# Patient Record
Sex: FEMALE | Race: OTHER | HISPANIC OR LATINO | Employment: FULL TIME | URBAN - METROPOLITAN AREA
[De-identification: names, ages, dates, MRNs, and addresses within clinical notes are randomized per-mention and may not be internally consistent; named-entity substitution may affect disease eponyms.]

---

## 2017-11-13 ENCOUNTER — OFFICE VISIT (OUTPATIENT)
Dept: FAMILY MEDICINE | Facility: CLINIC | Age: 22
End: 2017-11-13
Payer: COMMERCIAL

## 2017-11-13 ENCOUNTER — HOSPITAL ENCOUNTER (EMERGENCY)
Facility: HOSPITAL | Age: 22
Discharge: HOME | End: 2017-11-13
Attending: EMERGENCY MEDICINE
Payer: COMMERCIAL

## 2017-11-13 VITALS
WEIGHT: 164 LBS | DIASTOLIC BLOOD PRESSURE: 71 MMHG | BODY MASS INDEX: 29.06 KG/M2 | TEMPERATURE: 98 F | HEIGHT: 63 IN | OXYGEN SATURATION: 99 % | SYSTOLIC BLOOD PRESSURE: 117 MMHG | HEART RATE: 80 BPM | RESPIRATION RATE: 15 BRPM

## 2017-11-13 VITALS
WEIGHT: 166.13 LBS | HEIGHT: 62 IN | BODY MASS INDEX: 30.57 KG/M2 | HEART RATE: 76 BPM | SYSTOLIC BLOOD PRESSURE: 100 MMHG | TEMPERATURE: 99 F | OXYGEN SATURATION: 98 % | DIASTOLIC BLOOD PRESSURE: 70 MMHG

## 2017-11-13 DIAGNOSIS — R42 DIZZINESS: ICD-10-CM

## 2017-11-13 DIAGNOSIS — H53.131 ACUTE LOSS OF VISION, RIGHT: Primary | ICD-10-CM

## 2017-11-13 DIAGNOSIS — R20.0 NUMBNESS ON RIGHT SIDE: ICD-10-CM

## 2017-11-13 DIAGNOSIS — H53.9 VISION CHANGES: ICD-10-CM

## 2017-11-13 DIAGNOSIS — R20.2 TINGLING: Primary | ICD-10-CM

## 2017-11-13 LAB
B-HCG UR QL: NEGATIVE
CTP QC/QA: YES

## 2017-11-13 PROCEDURE — 81025 URINE PREGNANCY TEST: CPT | Performed by: EMERGENCY MEDICINE

## 2017-11-13 PROCEDURE — 93005 ELECTROCARDIOGRAM TRACING: CPT

## 2017-11-13 PROCEDURE — 25000003 PHARM REV CODE 250: Performed by: NURSE PRACTITIONER

## 2017-11-13 PROCEDURE — 99999 PR PBB SHADOW E&M-NEW PATIENT-LVL III: CPT | Mod: PBBFAC,,, | Performed by: FAMILY MEDICINE

## 2017-11-13 PROCEDURE — 99203 OFFICE O/P NEW LOW 30 MIN: CPT | Mod: S$GLB,,, | Performed by: FAMILY MEDICINE

## 2017-11-13 PROCEDURE — 99284 EMERGENCY DEPT VISIT MOD MDM: CPT | Mod: 25

## 2017-11-13 PROCEDURE — 93010 ELECTROCARDIOGRAM REPORT: CPT | Mod: ,,, | Performed by: INTERNAL MEDICINE

## 2017-11-13 RX ORDER — TETRACAINE HYDROCHLORIDE 5 MG/ML
2 SOLUTION OPHTHALMIC
Status: COMPLETED | OUTPATIENT
Start: 2017-11-13 | End: 2017-11-13

## 2017-11-13 RX ADMIN — TETRACAINE HYDROCHLORIDE 2 DROP: 5 SOLUTION OPHTHALMIC at 10:11

## 2017-11-13 RX ADMIN — FLUORESCEIN SODIUM 1 STRIP: 1 STRIP OPHTHALMIC at 10:11

## 2017-11-13 NOTE — PROGRESS NOTES
Office Visit    Patient Name: Talia Marin    : 1995  MRN: [id number]      Assessment/Plan:  Talia Marin is a 22 y.o. female who presents today for :    Acute loss of vision, right  Numbness on right side  -     Refer to Emergency Dept.    -AFVSS, patient appears well during this visit  -no focal neurological Sx but given Fhx and recurrence of episode of visual field loss prior to clinic encounter, will send patient to ED for further evaluation.      -I had a long discussion with patient about need to go to ED right now for further evaluation given her presentation of acute visual field loss with recurring unprovoked Sx.  I offered to call ambulance for patient but she refused and states that her mother will come and drive her, which I did not recommend.      -I notified ED that patient is come.    Return for worsening Sx. Urgent care/ED precautions provided.     This note was created by combination of typed  and Dragon dictation.  Transcription errors may be present.  If there are any questions, please contact me.        ----------------------------------------------------------------------------------------------------------------------      HPI:  Talia is a 22 y.o. female who presents today for:    numbess and vision loss        This patient has multiple medical diagnoses as noted below.    This patient is new to me     Patient is here today for numbess and vision loss  that she had felt yesterday for about 10 minutes. States that she was working and suddently felt that she couldn't see anything on the right side of her visual field. She also endorsed right sided numbness and remembering having a hard time moving her R arm - this spontaneously went away after a few seconds.  She had some moderate frontal headache associated for a few hours after the vision loss, but that had resolved since yesterday.    Earlier this afternoon, while driving to this appointment visit, she states that she  "started to feel that the Right side of her visual field started to slip again, and that she is losing her sight again with blurry vision and feeling like she would pass out, with frontal headache.  She is not sure if she continues to have the same numbness she had yesterday. She denies weakness today.  She is not on any medications at this time. She has a Fhx HTN/HLD on her mother's side, as well as an aunt with a h/o Stroke.    Denies any F/C/N/V/palpitations/CP/LEONE/SOB/photophobia or phonophobia/syncope/neck stiffness/tingling/abd pain/swelling/bowel or bladder changes.   NO slurring of speech/confusion/falling or gait changes         Patient Active Problem List   Diagnosis    Tingling       History reviewed. No pertinent surgical history.    Family History   Problem Relation Age of Onset    Hyperlipidemia Father     Hypertension Father        Social History     Social History    Marital status: Single     Spouse name: N/A    Number of children: N/A    Years of education: N/A     Occupational History    Not on file.     Social History Main Topics    Smoking status: Never Smoker    Smokeless tobacco: Never Used    Alcohol use No    Drug use: No    Sexual activity: Not on file     Other Topics Concern    Not on file     Social History Narrative    No narrative on file       Current Medications  Medications reviewed and updated.     Allergies   Review of patient's allergies indicates:  No Known Allergies          Review of Systems  See HPI      Physical Exam  /70 (BP Location: Left arm, Patient Position: Sitting, BP Method: Large (Manual))   Pulse 76   Temp 98.5 °F (36.9 °C) (Oral)   Ht 5' 2" (1.575 m)   Wt 75.4 kg (166 lb 1.9 oz)   SpO2 98%   BMI 30.38 kg/m²     GEN: NAD, well developed, pleasant, well nourished  HEENT: NCAT, PERRLA, EOMI, sclera clear, anicteric, O/P clear, MMM with no lesions  NECK: normal, supple with midline trachea, no LAD, no thyromegaly  LUNGS: CTAB, no w/r/r, no " increased work of breathing   HEART: RRR, normal S1 and S2, no m/r/g, no edema  ABD: s/nt/nd, NABS  SKIN: normal turgor, no rashes  PSYCH: AOx3, appropriate mood and affect  MSK: warm/well perfused, normal ROM in all 4 extremities, no c/c/e.  NEURO: normal without focal findings, CN II-XII are intact.  Sensation grossly normal, gait and station normal. Normal hearing test  Reflexes 2+ for biceps, brachial, patellar and achilles bilateral and symmetric, finger to nose and cerebellar exam normal, no tremors

## 2017-11-14 NOTE — ED PROVIDER NOTES
"Encounter Date: 11/13/2017    SCRIBE #1 NOTE: I, Tri Florence, am scribing for, and in the presence of,  Megan Mata NP . I have scribed the following portions of the note - Other sections scribed: HPI and ROS .       History     Chief Complaint   Patient presents with    Tingling     " Saturday night I noticed my vision in my right eye was getting blurry. Yesterday at work I felt like the right side of my body was tingling. I was evaluated by my PCP (MD Ailyn) who asked my to come to the ER for further evaluation. Denies symptoms curreently      CC:Numbness and blurred vision.   History obtained from patient.  Pt arrived via personal transportation.     HPI: This 22 y.o. Female, who has Anemia, presents to the ED for evaluation of multiple medical complaints, as follows: 2 days ago, the patient experienced mildly blurred vision in the R eye periphery, lasting seconds. 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 She states symptoms resolved spontaneously after lasting briefly. However, she was encouraged to seek treatment in a nearby urgent care by her boss. There were no significant findings. She was evaluated by her PCP, Dr. Robertson today who encouraged her to seek further evaluation in the ED. After being evaluated today while driving to the ED, she again experienced blurred vision in the R eye. She also complains of a left sided headache and "sharp" Right lateral neck pain today and states, "It felt like my eyeball (left) was going to pop out of my head especially when I bent down to  my daughter." She also notes an episode of anxiety with associated SOB followed by onset of mild chest pain while driving to be evaluated in the ED. She notes that this was induced by a stressful conversation " with her sister over the phone while driving. The patient further complains of nausea and difficulty sleeping x 1 month. Overall, the patient denies vomiting or fever. She reports no further symptoms. No prior attempted treatment. No alleviating factors. The patient has no history of tobacco abuse.       The history is provided by the patient.     Review of patient's allergies indicates:  No Known Allergies  Past Medical History:   Diagnosis Date    Anemia      History reviewed. No pertinent surgical history.  Family History   Problem Relation Age of Onset    Hyperlipidemia Father     Hypertension Father      Social History   Substance Use Topics    Smoking status: Never Smoker    Smokeless tobacco: Never Used    Alcohol use No     Review of Systems   Constitutional: Negative for chills and fever.   HENT: Negative for congestion and rhinorrhea.    Eyes: Positive for visual disturbance (blurred in R eye, currently resolved ). Negative for redness.   Respiratory: Positive for shortness of breath (currently resolved ). Negative for cough.    Cardiovascular: Positive for chest pain.   Gastrointestinal: Negative for abdominal pain, diarrhea, nausea and vomiting.   Genitourinary: Negative for difficulty urinating and dysuria.   Musculoskeletal: Positive for neck pain.   Skin: Negative for rash.   Neurological: Positive for numbness (R side body ) and headaches. Negative for tremors, seizures and syncope.   Psychiatric/Behavioral: Positive for sleep disturbance. The patient is nervous/anxious.        Physical Exam     Initial Vitals [11/13/17 1826]   BP Pulse Resp Temp SpO2   (!) 148/80 101 16 99 °F (37.2 °C) 100 %      MAP       102.67         Physical Exam    Constitutional: Vital signs are normal. She appears well-developed and well-nourished.  Non-toxic appearance.   HENT:   Head: Normocephalic and atraumatic.   Right Ear: Tympanic membrane normal.   Left Ear: Tympanic membrane normal.   Nose: Nose normal.    Mouth/Throat: Uvula is midline and oropharynx is clear and moist. No trismus in the jaw.   Eyes: Conjunctivae, EOM and lids are normal. Pupils are equal, round, and reactive to light.   Slit lamp exam:       The right eye shows no corneal abrasion, no corneal flare, no corneal ulcer, no fluorescein uptake and no anterior chamber bulge.        The left eye shows no corneal abrasion, no corneal flare, no corneal ulcer, no fluorescein uptake and no anterior chamber bulge.   No pain with EOMs or photophobia  Left eye IOP: 18mmHg  Right eye IOP: 20mmHg   Neck: Full passive range of motion without pain. Neck supple. No neck rigidity.   Cardiovascular: Normal rate, S1 normal, S2 normal and normal heart sounds. Exam reveals no gallop.    No murmur heard.  Pulmonary/Chest: Effort normal and breath sounds normal. No tachypnea. She has no decreased breath sounds. She has no wheezes. She has no rhonchi. She has no rales.   Neurological: She is alert and oriented to person, place, and time. She has normal strength. Gait normal. GCS eye subscore is 4. GCS verbal subscore is 5. GCS motor subscore is 6.   Skin: Skin is warm and dry. No rash noted.         ED Course   Procedures  Labs Reviewed   POCT URINE PREGNANCY     EKG Readings: (Independently Interpreted)   Rhythm: Normal Sinus Rhythm. Heart Rate: 74. Ectopy: No Ectopy. Conduction: Normal. ST Segments: Normal ST Segments. T Waves: Normal. Clinical Impression: Normal Sinus Rhythm       X-Rays:   Independently Interpreted Readings:   Head CT: No hemorrhage.  No skull fracture.  No acute stroke.     Medical Decision Making:   ED Management:  This is a 22-year-old nonpregnant female who presents to the ED with complaints of intermittent, right sided numbness, blurry vision with associated left sided headaches.  She is afebrile and well-appearing.  At the time of evaluation, she is asymptomatic.  On exam, she is neurologically intact.  Her vision is 20/25.  Slit-lamp examination  unremarkable.  Her IOP's are at the higher end of normal.  No blurry or decreased peripheral vision at this time.  Head CT is negative.  I suspect ocular migraine.  No evidence to suggest meningitis, stroke, ruptured aneurysm or other serious etiology.  Strict return precautions given.  Discharged home with instructions for supportive care and follow-up with neurology.  Patient was also seen and evaluated by Dr. Jo, who agrees her plan.            Scribe Attestation:   Scribe #1: I performed the above scribed service and the documentation accurately describes the services I performed. I attest to the accuracy of the note.    Attending Attestation:           Physician Attestation for Scribe:  Physician Attestation Statement for Scribe #1: I, Megan Mata NP, reviewed documentation, as scribed by Tri Florence  in my presence, and it is both accurate and complete.                 ED Course      Clinical Impression:   The primary encounter diagnosis was Tingling. Diagnoses of Dizziness and Vision changes were also pertinent to this visit.    Disposition:   Disposition: Discharged  Condition: Stable                        Megan Mata NP  11/14/17 0221

## 2017-11-14 NOTE — DISCHARGE INSTRUCTIONS
You may be having ocular migraines.  You need evaluation by a specialist--eye and neurology.  Please schedule follow up appointments as soon as possible.

## 2017-11-14 NOTE — ED TRIAGE NOTES
""yesterday the right side of my body went numb, I couldn't see to the side out of right eye. I have a constant headache and right sided neck pain. My PCP wants me to get an MRI." Alaso having blurred vision and nausea w/o vom.   "

## 2017-11-15 ENCOUNTER — TELEPHONE (OUTPATIENT)
Dept: OPHTHALMOLOGY | Facility: CLINIC | Age: 22
End: 2017-11-15

## 2018-08-01 ENCOUNTER — OFFICE VISIT (OUTPATIENT)
Dept: OBSTETRICS AND GYNECOLOGY | Facility: CLINIC | Age: 23
End: 2018-08-01
Payer: COMMERCIAL

## 2018-08-01 VITALS
BODY MASS INDEX: 31.76 KG/M2 | WEIGHT: 179.25 LBS | DIASTOLIC BLOOD PRESSURE: 58 MMHG | HEIGHT: 63 IN | SYSTOLIC BLOOD PRESSURE: 100 MMHG

## 2018-08-01 DIAGNOSIS — Z32.01 POSITIVE PREGNANCY TEST: ICD-10-CM

## 2018-08-01 DIAGNOSIS — N91.1 AMENORRHEA, SECONDARY: Primary | ICD-10-CM

## 2018-08-01 LAB
B-HCG UR QL: POSITIVE
CTP QC/QA: YES

## 2018-08-01 PROCEDURE — 99999 PR PBB SHADOW E&M-EST. PATIENT-LVL III: CPT | Mod: PBBFAC,,, | Performed by: OBSTETRICS & GYNECOLOGY

## 2018-08-01 PROCEDURE — 81025 URINE PREGNANCY TEST: CPT | Mod: S$GLB,,, | Performed by: OBSTETRICS & GYNECOLOGY

## 2018-08-01 PROCEDURE — 99203 OFFICE O/P NEW LOW 30 MIN: CPT | Mod: S$GLB,,, | Performed by: OBSTETRICS & GYNECOLOGY

## 2018-08-01 PROCEDURE — 3008F BODY MASS INDEX DOCD: CPT | Mod: CPTII,S$GLB,, | Performed by: OBSTETRICS & GYNECOLOGY

## 2018-08-01 NOTE — PROGRESS NOTES
"[facility]  Ambulatory Clinic  Obstetrics & Gynecology    Visit Date:  2018     Chief Complaint:  Missed my period    History of Present Illness:      Talia Marin is a 23 y.o. , UPT positive today, here with c/o missed period.    Patient's last menstrual period was 2018.    Pt has no major complaints today and denies any vaginal bleeding, discharge, pain, GI/ compliants.      Pt reports overall good health.    Past Medical History:      None      Past Surgical History:     None     Medications:      Prenatal vitamins     Allergies:      NKDA      Obstetric History:          T2      L2     SAB1   TAB0   Ectopic0   Multiple0   Live Births2       # Outcome Date GA Lbr Brandan/2nd Weight Sex Delivery Anes PTL Lv   4 Current            3 Term 06/08/15    F Vag-Spont  N ALEE   2 Term 12    M Vag-Spont  N ALEE   1 SAB               Obstetric Comments   G2- IUFD of twins at 17 wks     Gynecologic History:      Denies recent/active STI  Denies history abnormal Pap, last pap 2017 at [facility]     Social History:      Denies tobacco, alcohol or illicit drug use  Current partner is father of baby  Denies domestic abuse     Family History:       Denies congenital anomalies, inherited syndromes, fetal aneuploidy    Review of Systems:      Constitutional:  No fever, fatigue  HENT:  No congestion, hearing changes  Eyes:  No visual disturbance  Respiratory:  No cough, shortness of breath  Cardiovascular:  No chest pain, leg swelling  Breast:  No lump, pain, nipple discharge, redness, skin changes  Gastrointestinal:  No abdominal pain, constipation, blood in stool   Genitourinary:  No dysuria, frequency  Endocrine:  No heat or cold intolerance  Musculoskeletal:  No back pain, arthralgias  Skin:  No rash, jaundice  Neurological:  No dizziness, weakness, headaches  Psychiatric/Behavioral:  No sleep disturbance, dysphoric mood     Physical Exam:     BP (!) 100/58   Ht 5' 3" " (1.6 m)   Wt 81.3 kg (179 lb 3.7 oz)   LMP 2018   BMI 31.75 kg/m²      GENERAL:  NAD. Well-nourished. A&Ox3.  HEENT:  NCAT, EOMI, moist mucus membranes.  Neck supple w/o masses.  BREAST:  Symmetric, no obvious masses, adenopathy, skin changes or nipple discharge.  LUNGS:  CTA-B.  HEART:  RRR, physiologic heart sounds.  ABDOMEN:  Soft, non-tender. Normoactive BS.  No obvious organomegaly.    EXT:  Symmetric w/o cramping, claudication, or edema. +2 distal pulses. FROM.  SKIN:  No rashes  NEURO:  CN II - XII grossly intact bilaterally. +2 DTR.  PSYCH:  Mood & affect appropriate.       PELVIC:  Female external genitalia w/o any obvious lesions.  Adequate perineal body. Normal urethral meatus. No gross lymphadenopathy.    Vagina:  Pink, moist, well-rugated.  Vaginal vault with good support.  No obvious lesion.  No discharge noted.     Cervix:  No cervical motion tenderness, discharge, or obvious lesions.  Closed, thick, posterior.  Uterus:  Small, non-tender, normal contour.  Adnexa:  No masses or tenderness.    Rectal:  Declined.  No obvious external lesions.   Wet prep:  Negative    Chaperone present for exam.    Assessment:     23 y.o. , UPT positive, LMP 2018, here for confirmation of pregnancy    Plan:    We discussed principles of prenatal care, weight gain goals, dietary/lifestyle modifications, pregnancy care instructions and precautions.  Prenatal educational material given to pt.  Continue prenatal vitamins.  SAB and ectopic precautions reviewed.      Return in 4 weeks for OB visit, or return sooner prn.  All questions answered, pt voiced understanding.      George Robertson MD

## 2018-08-17 ENCOUNTER — TELEPHONE (OUTPATIENT)
Dept: OBSTETRICS AND GYNECOLOGY | Facility: CLINIC | Age: 23
End: 2018-08-17

## 2018-08-17 DIAGNOSIS — O21.9 NAUSEA AND VOMITING DURING PREGNANCY: Primary | ICD-10-CM

## 2018-08-17 RX ORDER — ONDANSETRON 4 MG/1
4 TABLET, ORALLY DISINTEGRATING ORAL EVERY 6 HOURS PRN
Qty: 30 TABLET | Refills: 0 | Status: SHIPPED | OUTPATIENT
Start: 2018-08-17 | End: 2019-01-28 | Stop reason: SDUPTHER

## 2018-08-17 NOTE — TELEPHONE ENCOUNTER
----- Message from Khanh Harmon sent at 8/17/2018  3:51 PM CDT -----  Contact: self  Pt called requesting nausea medication. [place] [phone]. Pt [phone].

## 2018-09-05 ENCOUNTER — INITIAL PRENATAL (OUTPATIENT)
Dept: OBSTETRICS AND GYNECOLOGY | Facility: CLINIC | Age: 23
End: 2018-09-05
Payer: COMMERCIAL

## 2018-09-05 VITALS
SYSTOLIC BLOOD PRESSURE: 116 MMHG | HEART RATE: 70 BPM | WEIGHT: 178 LBS | BODY MASS INDEX: 31.53 KG/M2 | DIASTOLIC BLOOD PRESSURE: 68 MMHG

## 2018-09-05 DIAGNOSIS — Z36.89 ENCOUNTER TO ESTABLISH GESTATIONAL AGE USING ULTRASOUND: ICD-10-CM

## 2018-09-05 DIAGNOSIS — Z3A.10 10 WEEKS GESTATION OF PREGNANCY: Primary | ICD-10-CM

## 2018-09-05 PROCEDURE — 87088 URINE BACTERIA CULTURE: CPT

## 2018-09-05 PROCEDURE — 0500F INITIAL PRENATAL CARE VISIT: CPT | Mod: S$GLB,,, | Performed by: OBSTETRICS & GYNECOLOGY

## 2018-09-05 PROCEDURE — 87086 URINE CULTURE/COLONY COUNT: CPT

## 2018-09-05 PROCEDURE — 76801 OB US < 14 WKS SINGLE FETUS: CPT | Mod: S$GLB,,, | Performed by: OBSTETRICS & GYNECOLOGY

## 2018-09-05 PROCEDURE — 99999 PR PBB SHADOW E&M-EST. PATIENT-LVL III: CPT | Mod: PBBFAC,,, | Performed by: OBSTETRICS & GYNECOLOGY

## 2018-09-05 PROCEDURE — 87077 CULTURE AEROBIC IDENTIFY: CPT

## 2018-09-05 PROCEDURE — 87186 SC STD MICRODIL/AGAR DIL: CPT

## 2018-09-05 NOTE — PROGRESS NOTES
"[facility]  Ambulatory Clinic  Obstetrics & Gynecology    Visit Date:  2018     Chief Complaint:  Missed my period    History of Present Illness:      Talia Marin is a 23 y.o. , UPT positive today, here with c/o missed period.    Patient's last menstrual period was 2018.    Pt has no major complaints today and denies any vaginal bleeding, discharge, pain, GI/ compliants.      Pt reports overall good health.    Past Medical History:      None      Past Surgical History:     None     Medications:      Prenatal vitamins     Allergies:      NKDA      Obstetric History:          T2      L2     SAB1   TAB0   Ectopic0   Multiple0   Live Births2       # Outcome Date GA Lbr Brandan/2nd Weight Sex Delivery Anes PTL Lv   4 Current            3 Term 06/08/15    F Vag-Spont  N ALEE   2 Term 12    M Vag-Spont  N ALEE   1 SAB               Obstetric Comments   G2- IUFD of twins at 17 wks     Gynecologic History:      Denies recent/active STI  Denies history abnormal Pap, last pap 2017 at [facility]     Social History:      Denies tobacco, alcohol or illicit drug use  Current partner is father of baby  Denies domestic abuse     Family History:       Denies congenital anomalies, inherited syndromes, fetal aneuploidy    Review of Systems:      Constitutional:  No fever, fatigue  HENT:  No congestion, hearing changes  Eyes:  No visual disturbance  Respiratory:  No cough, shortness of breath  Cardiovascular:  No chest pain, leg swelling  Breast:  No lump, pain, nipple discharge, redness, skin changes  Gastrointestinal:  No abdominal pain, constipation, blood in stool   Genitourinary:  No dysuria, frequency  Endocrine:  No heat or cold intolerance  Musculoskeletal:  No back pain, arthralgias  Skin:  No rash, jaundice  Neurological:  No dizziness, weakness, headaches  Psychiatric/Behavioral:  No sleep disturbance, dysphoric mood     Physical Exam:     BP (!) 100/58   Ht 5' 3" " (1.6 m)   Wt 81.3 kg (179 lb 3.7 oz)   LMP 2018   BMI 31.75 kg/m²      GENERAL:  NAD. Well-nourished. A&Ox3.  HEENT:  NCAT, EOMI, moist mucus membranes.  Neck supple w/o masses.  BREAST:  Symmetric, no obvious masses, adenopathy, skin changes or nipple discharge.  LUNGS:  CTA-B.  HEART:  RRR, physiologic heart sounds.  ABDOMEN:  Soft, non-tender. Normoactive BS.  No obvious organomegaly.    EXT:  Symmetric w/o cramping, claudication, or edema. +2 distal pulses. FROM.  SKIN:  No rashes  NEURO:  CN II - XII grossly intact bilaterally. +2 DTR.  PSYCH:  Mood & affect appropriate.       PELVIC:  Female external genitalia w/o any obvious lesions.  Adequate perineal body. Normal urethral meatus. No gross lymphadenopathy.    Vagina:  Pink, moist, well-rugated.  Vaginal vault with good support.  No obvious lesion.  No discharge noted.     Cervix:  No cervical motion tenderness, discharge, or obvious lesions.  Closed, thick, posterior.  Uterus:  Small, non-tender, normal contour.  Adnexa:  No masses or tenderness.    Rectal:  Declined.  No obvious external lesions.   Wet prep:  Negative    Chaperone present for exam.    Assessment:     23 y.o. , UPT positive, LMP 2018, here for confirmation of pregnancy    Plan:    We discussed principles of prenatal care, weight gain goals, dietary/lifestyle modifications, pregnancy care instructions and precautions.  Prenatal educational material given to pt.  Continue prenatal vitamins.  SAB and ectopic precautions reviewed.      Return in 4 weeks for OB visit, or return sooner prn.  All questions answered, pt voiced understanding.      George Robertson MD    _____________________________________________________________________    2018    10w2d here for initial OB visit and dating ultrasound.  Pt has no major complaints today.  Dating u/s shows single live IUP at 11w0d with SARI 3/27/2018.  Limitation of today's u/s exam discussed.  Order initial OB labs.  Pt declined  first trimester aneuploidy screening, and state she would not terminate the pregnancy for any reasons.  Principles of prenatal care and precautions reviewed.  Return 4 wks.  Voiced understanding.  Significant other present for visit.    George Robertson MD  _____________________________________________________________________

## 2018-09-07 LAB — BACTERIA UR CULT: NORMAL

## 2018-09-08 DIAGNOSIS — R82.71 ASYMPTOMATIC BACTERIURIA: Primary | ICD-10-CM

## 2018-09-08 RX ORDER — NITROFURANTOIN 25; 75 MG/1; MG/1
100 CAPSULE ORAL 2 TIMES DAILY
Qty: 14 CAPSULE | Refills: 0 | Status: SHIPPED | OUTPATIENT
Start: 2018-09-08 | End: 2018-09-15

## 2018-09-11 ENCOUNTER — LAB VISIT (OUTPATIENT)
Dept: LAB | Facility: HOSPITAL | Age: 23
End: 2018-09-11
Attending: OBSTETRICS & GYNECOLOGY
Payer: COMMERCIAL

## 2018-09-11 DIAGNOSIS — Z36.89 ENCOUNTER TO ESTABLISH GESTATIONAL AGE USING ULTRASOUND: ICD-10-CM

## 2018-09-11 LAB
ABO + RH BLD: NORMAL
ALBUMIN SERPL BCP-MCNC: 3.4 G/DL
ALP SERPL-CCNC: 47 U/L
ALT SERPL W/O P-5'-P-CCNC: 11 U/L
ANION GAP SERPL CALC-SCNC: 7 MMOL/L
AST SERPL-CCNC: 16 U/L
BASOPHILS # BLD AUTO: 0.01 K/UL
BASOPHILS NFR BLD: 0.1 %
BILIRUB SERPL-MCNC: 0.3 MG/DL
BLD GP AB SCN CELLS X3 SERPL QL: NORMAL
BUN SERPL-MCNC: 6 MG/DL
CALCIUM SERPL-MCNC: 9.7 MG/DL
CHLORIDE SERPL-SCNC: 104 MMOL/L
CO2 SERPL-SCNC: 25 MMOL/L
CREAT SERPL-MCNC: 0.7 MG/DL
DIFFERENTIAL METHOD: ABNORMAL
EOSINOPHIL # BLD AUTO: 0.1 K/UL
EOSINOPHIL NFR BLD: 0.6 %
ERYTHROCYTE [DISTWIDTH] IN BLOOD BY AUTOMATED COUNT: 12.1 %
EST. GFR  (AFRICAN AMERICAN): >60 ML/MIN/1.73 M^2
EST. GFR  (NON AFRICAN AMERICAN): >60 ML/MIN/1.73 M^2
GLUCOSE SERPL-MCNC: 85 MG/DL
HCT VFR BLD AUTO: 33.7 %
HGB BLD-MCNC: 11.5 G/DL
LYMPHOCYTES # BLD AUTO: 2.5 K/UL
LYMPHOCYTES NFR BLD: 31.2 %
MCH RBC QN AUTO: 29.2 PG
MCHC RBC AUTO-ENTMCNC: 34.1 G/DL
MCV RBC AUTO: 86 FL
MONOCYTES # BLD AUTO: 0.6 K/UL
MONOCYTES NFR BLD: 8.1 %
NEUTROPHILS # BLD AUTO: 4.7 K/UL
NEUTROPHILS NFR BLD: 59.7 %
PLATELET # BLD AUTO: 341 K/UL
PMV BLD AUTO: 9.2 FL
POTASSIUM SERPL-SCNC: 3.7 MMOL/L
PROT SERPL-MCNC: 7.2 G/DL
RBC # BLD AUTO: 3.94 M/UL
SODIUM SERPL-SCNC: 136 MMOL/L
WBC # BLD AUTO: 7.88 K/UL

## 2018-09-11 PROCEDURE — 85025 COMPLETE CBC W/AUTO DIFF WBC: CPT

## 2018-09-11 PROCEDURE — 83036 HEMOGLOBIN GLYCOSYLATED A1C: CPT

## 2018-09-11 PROCEDURE — 86762 RUBELLA ANTIBODY: CPT

## 2018-09-11 PROCEDURE — 86901 BLOOD TYPING SEROLOGIC RH(D): CPT

## 2018-09-11 PROCEDURE — 87340 HEPATITIS B SURFACE AG IA: CPT

## 2018-09-11 PROCEDURE — 80053 COMPREHEN METABOLIC PANEL: CPT

## 2018-09-11 PROCEDURE — 86592 SYPHILIS TEST NON-TREP QUAL: CPT

## 2018-09-11 PROCEDURE — 36415 COLL VENOUS BLD VENIPUNCTURE: CPT

## 2018-09-11 PROCEDURE — 86703 HIV-1/HIV-2 1 RESULT ANTBDY: CPT

## 2018-09-12 LAB
ESTIMATED AVG GLUCOSE: 100 MG/DL
HBA1C MFR BLD HPLC: 5.1 %
HBV SURFACE AG SERPL QL IA: NEGATIVE
HIV 1+2 AB+HIV1 P24 AG SERPL QL IA: NEGATIVE

## 2018-09-13 ENCOUNTER — TELEPHONE (OUTPATIENT)
Dept: OBSTETRICS AND GYNECOLOGY | Facility: CLINIC | Age: 23
End: 2018-09-13

## 2018-09-13 LAB
RPR SER QL: NORMAL
RUBV IGG SER-ACNC: 6.9 IU/ML
RUBV IGG SER-IMP: ABNORMAL

## 2018-09-13 NOTE — TELEPHONE ENCOUNTER
----- Message from Doris Giles sent at 9/13/2018  9:19 AM CDT -----  Contact: Self/ [phone]  Pt returning call for results from 9/11/18 lab visit. Thank you.

## 2018-09-13 NOTE — TELEPHONE ENCOUNTER
Received incoming call from pt. Requested results from recent test. Information given. Pt acknowledged. CW

## 2018-09-28 ENCOUNTER — ROUTINE PRENATAL (OUTPATIENT)
Dept: OBSTETRICS AND GYNECOLOGY | Facility: CLINIC | Age: 23
End: 2018-09-28
Payer: COMMERCIAL

## 2018-09-28 VITALS — BODY MASS INDEX: 31.53 KG/M2 | SYSTOLIC BLOOD PRESSURE: 122 MMHG | WEIGHT: 178 LBS | DIASTOLIC BLOOD PRESSURE: 64 MMHG

## 2018-09-28 DIAGNOSIS — Z3A.13 13 WEEKS GESTATION OF PREGNANCY: Primary | ICD-10-CM

## 2018-09-28 PROCEDURE — 87088 URINE BACTERIA CULTURE: CPT

## 2018-09-28 PROCEDURE — 0502F SUBSEQUENT PRENATAL CARE: CPT | Mod: S$GLB,,, | Performed by: OBSTETRICS & GYNECOLOGY

## 2018-09-28 PROCEDURE — 87491 CHLMYD TRACH DNA AMP PROBE: CPT

## 2018-09-28 PROCEDURE — 99999 PR PBB SHADOW E&M-EST. PATIENT-LVL III: CPT | Mod: PBBFAC,,, | Performed by: OBSTETRICS & GYNECOLOGY

## 2018-09-28 PROCEDURE — 87077 CULTURE AEROBIC IDENTIFY: CPT

## 2018-09-28 PROCEDURE — 87186 SC STD MICRODIL/AGAR DIL: CPT

## 2018-09-28 PROCEDURE — 87086 URINE CULTURE/COLONY COUNT: CPT

## 2018-09-28 NOTE — PROGRESS NOTES
13w4d here for OB visit.    Pt c/o crampy lower back pain.  Pain is dull and crampy, episodic in nature, no particular aggravating factors, and relieved with rest.  Denies vaginal bleeding, leakage of fluid, or contractions.    Abd soft/NT.  No CVA or suprapubic tenderness.  We discussed supportive care measures for her lower back/round ligament pain discussed, tylenol prn, maternity belt.    Completed antibx for asymptomatic bacteriuria since last visit.  Denies UTI sxs.  Repreat urine cx today.    Order quad screen next visit.    Labor/back/UTI precautions.  Return 4 wks.  Voiced understanding.  Significant other present for visit.

## 2018-09-30 LAB
BACTERIA UR CULT: NORMAL
C TRACH DNA SPEC QL NAA+PROBE: DETECTED
N GONORRHOEA DNA SPEC QL NAA+PROBE: NOT DETECTED

## 2018-10-02 DIAGNOSIS — R82.71 ASYMPTOMATIC BACTERIURIA DURING PREGNANCY: ICD-10-CM

## 2018-10-02 DIAGNOSIS — O99.891 ASYMPTOMATIC BACTERIURIA DURING PREGNANCY: ICD-10-CM

## 2018-10-02 DIAGNOSIS — A74.9 CHLAMYDIA: Primary | ICD-10-CM

## 2018-10-02 RX ORDER — CEPHALEXIN 500 MG/1
500 CAPSULE ORAL EVERY 12 HOURS
Qty: 14 CAPSULE | Refills: 0 | Status: SHIPPED | OUTPATIENT
Start: 2018-10-02 | End: 2018-10-09

## 2018-10-02 RX ORDER — AZITHROMYCIN 500 MG/1
1000 TABLET, FILM COATED ORAL ONCE
Qty: 4 TABLET | Refills: 0 | Status: SHIPPED | OUTPATIENT
Start: 2018-10-02 | End: 2018-10-02

## 2018-10-05 ENCOUNTER — TELEPHONE (OUTPATIENT)
Dept: OBSTETRICS AND GYNECOLOGY | Facility: CLINIC | Age: 23
End: 2018-10-05

## 2018-10-05 NOTE — TELEPHONE ENCOUNTER
----- Message from George Robertson MD sent at 10/2/2018 11:13 PM CDT -----  Please notify pt of her POSITIVE chlamydia test and abnormal urine culture.      A Rx for azithromycin and keflex has been sent to her pharmacy on file at:    [facility] [id number]  [place] - [address] AT [facility] [address]  [address]  [place] [address]  Phone: [phone] Fax: [phone]    Advise pt to abstain from all sexually activities and inform her partner(s) to seek STI testing/treatment.    Compliance with f/u strongly advised.    Thanks

## 2018-10-05 NOTE — TELEPHONE ENCOUNTER
Patient notified of positive chlamydia and positive urine culture. Informed of medication sent to her pharmacy. Patient also made aware she needs to abstain from all sexual activities and inform partner to seek testing/treatment.

## 2018-10-15 ENCOUNTER — TELEPHONE (OUTPATIENT)
Dept: OBSTETRICS AND GYNECOLOGY | Facility: CLINIC | Age: 23
End: 2018-10-15

## 2018-10-15 NOTE — TELEPHONE ENCOUNTER
----- Message from Kelsy havenrosette sent at 10/15/2018  9:33 AM CDT -----  Contact: self [phone]  Pt. Called needing clarification on ondansetron (ZOFRAN-ODT) 4 MG TbDL  --------------------------------------------  10/15/18 @ 5053 ([facility])  CALL ATTEMPT TO PT UNSUCCESSFUL , MESSAGE LEFT FOR PT TO RETURN CALL TO OFFICE CONCERNING HER MEDICATION ZOFRAN

## 2018-10-19 ENCOUNTER — ROUTINE PRENATAL (OUTPATIENT)
Dept: OBSTETRICS AND GYNECOLOGY | Facility: CLINIC | Age: 23
End: 2018-10-19
Payer: COMMERCIAL

## 2018-10-19 VITALS
DIASTOLIC BLOOD PRESSURE: 72 MMHG | BODY MASS INDEX: 31.77 KG/M2 | WEIGHT: 179.38 LBS | SYSTOLIC BLOOD PRESSURE: 122 MMHG

## 2018-10-19 DIAGNOSIS — Z3A.16 16 WEEKS GESTATION OF PREGNANCY: Primary | ICD-10-CM

## 2018-10-19 PROCEDURE — 99999 PR PBB SHADOW E&M-EST. PATIENT-LVL II: CPT | Mod: PBBFAC,,, | Performed by: OBSTETRICS & GYNECOLOGY

## 2018-10-19 PROCEDURE — 0502F SUBSEQUENT PRENATAL CARE: CPT | Mod: S$GLB,,, | Performed by: OBSTETRICS & GYNECOLOGY

## 2018-10-19 RX ORDER — AZITHROMYCIN 500 MG/1
TABLET, FILM COATED ORAL
Refills: 0 | COMMUNITY
Start: 2018-10-03 | End: 2018-11-16

## 2018-10-19 NOTE — PROGRESS NOTES
16w4d here for OB visit.    Pt has no major complaints today.  Reports active fetus.  Denies vaginal bleeding, leakage of fluid, or contractions.    Pt completing antibx for asymptomatic bacteriuria.  Compliance with therapy advised.  Denies UTI sxs.    Order quad screen today.  Anatomy ultrasound next visit.    Precautions reviewed.  Return 4 wks.  Voiced understanding.  Significant other present for visit.

## 2018-10-23 ENCOUNTER — LAB VISIT (OUTPATIENT)
Dept: LAB | Facility: HOSPITAL | Age: 23
End: 2018-10-23
Attending: OBSTETRICS & GYNECOLOGY
Payer: COMMERCIAL

## 2018-10-23 DIAGNOSIS — Z3A.16 16 WEEKS GESTATION OF PREGNANCY: ICD-10-CM

## 2018-10-23 PROCEDURE — 81511 FTL CGEN ABNOR FOUR ANAL: CPT

## 2018-10-23 PROCEDURE — 36415 COLL VENOUS BLD VENIPUNCTURE: CPT

## 2018-10-26 DIAGNOSIS — O28.0 ABNORMAL QUAD SCREEN: Primary | ICD-10-CM

## 2018-10-26 NOTE — PROGRESS NOTES
Please notify pt her quad screen is ABNORMAL.  A refer to MFM has been ordered.  Inform pt MFM office will contact her soon to schedule f/u evaluation.   Thanks

## 2018-10-29 ENCOUNTER — TELEPHONE (OUTPATIENT)
Dept: OBSTETRICS AND GYNECOLOGY | Facility: CLINIC | Age: 23
End: 2018-10-29

## 2018-10-30 ENCOUNTER — TELEPHONE (OUTPATIENT)
Dept: OBSTETRICS AND GYNECOLOGY | Facility: CLINIC | Age: 23
End: 2018-10-30

## 2018-10-30 NOTE — TELEPHONE ENCOUNTER
----- Message from Drois Gill sent at 10/30/2018 12:14 PM CDT -----  Contact: pt            Name of Who is Calling: pt      What is the request in detail: pt needs test results. Please call pt      Can the clinic reply by [facility]: no      [facility]: [phone]

## 2018-11-01 ENCOUNTER — TELEPHONE (OUTPATIENT)
Dept: MATERNAL FETAL MEDICINE | Facility: CLINIC | Age: 23
End: 2018-11-01

## 2018-11-01 ENCOUNTER — PROCEDURE VISIT (OUTPATIENT)
Dept: MATERNAL FETAL MEDICINE | Facility: CLINIC | Age: 23
End: 2018-11-01
Payer: COMMERCIAL

## 2018-11-01 ENCOUNTER — INITIAL CONSULT (OUTPATIENT)
Dept: MATERNAL FETAL MEDICINE | Facility: CLINIC | Age: 23
End: 2018-11-01
Attending: OBSTETRICS & GYNECOLOGY
Payer: COMMERCIAL

## 2018-11-01 VITALS
DIASTOLIC BLOOD PRESSURE: 82 MMHG | SYSTOLIC BLOOD PRESSURE: 120 MMHG | BODY MASS INDEX: 31.91 KG/M2 | WEIGHT: 180.13 LBS

## 2018-11-01 DIAGNOSIS — O28.9 ABNORMAL FINDING ON ANTENATAL SCREEN: Primary | ICD-10-CM

## 2018-11-01 DIAGNOSIS — O28.0 ABNORMAL QUAD SCREEN: Primary | ICD-10-CM

## 2018-11-01 DIAGNOSIS — O28.0 ABNORMAL QUAD SCREEN: ICD-10-CM

## 2018-11-01 PROCEDURE — 99213 OFFICE O/P EST LOW 20 MIN: CPT | Mod: 25,S$GLB,, | Performed by: OBSTETRICS & GYNECOLOGY

## 2018-11-01 PROCEDURE — 76811 OB US DETAILED SNGL FETUS: CPT | Mod: S$GLB,,, | Performed by: OBSTETRICS & GYNECOLOGY

## 2018-11-01 PROCEDURE — 99999 PR PBB SHADOW E&M-EST. PATIENT-LVL III: CPT | Mod: PBBFAC,,, | Performed by: OBSTETRICS & GYNECOLOGY

## 2018-11-01 PROCEDURE — 3008F BODY MASS INDEX DOCD: CPT | Mod: CPTII,S$GLB,, | Performed by: OBSTETRICS & GYNECOLOGY

## 2018-11-01 NOTE — PROGRESS NOTES
"Spoke with [employer] No selection area for "" on Quad Screen order in Epic, but states that she will replace "white" race with "Other/" and rerun Quad screen. Will call us later this afternoon and tomorrow with results. Will call pt with results.  "

## 2018-11-01 NOTE — TELEPHONE ENCOUNTER
Spoke with patient. Let her know that we had not received anything from [facility] yet but it will probably be tomorrow when we get the updated Quad screen calculation. Pt verbalizes understanding.

## 2018-11-01 NOTE — LETTER
November 1, 2018      George Robertson MD  [address] [facility] [address]  [address]  [facility] [address]           [facility] - Maternal Fetal Med  [address] [facility] [address]  Phone: [phone]          Patient: Talia Marin   MR Number: [id number]   YOB: 1995   Date of Visit: 11/1/2018       Dear Dr. George Robertson:    Thank you for referring Talia Marin to me for evaluation. Attached you will find relevant portions of my assessment and plan of care.    If you have questions, please do not hesitate to call me. I look forward to following Talia Marin along with you.    Sincerely,    Jitendra Metcalf III, MD    Enclosure  CC:  No Recipients    If you would like to receive this communication electronically, please contact [email or web address] or [phone] to request more information on [employer] access.    For providers and/or their staff who would like to refer a patient to [facility], please contact us through our one-stop-shop provider referral line, [facility], at [phone].    If you feel you have received this communication in error or would no longer like to receive these types of communications, please e-mail [email or web address]

## 2018-11-01 NOTE — PROGRESS NOTES
Consulted by Dr. Robertson for abnormal QUAD screen    23  SARI 19; 18w3d    Prenatal record, chart and OB History reviewed  Maternal serum screening reported increased risk of SLOS  Pt interviewed and examined  Ultrasound performed  No interval problems  No leaking, bleeding or discharge    OB HX  2012 - Christopher; 7lbs;  at term  2015 - Nidhi; 6lbs;  at term    Smith-Lemli-Opitz syndrome is a developmental disorder that affects many parts of the body. This condition is characterized by distinctive  facial features, small head size (microcephaly), intellectual disability or learning problems, and behavioral problems. Many affected children  have the characteristic features of autism, a developmental condition that affects communication and social interaction. Malformations of the  heart, lungs, kidneys, gastrointestinal tract, and genitalia are also common.    Infants with Capps-Lemli-Opitz syndrome have weak muscle tone (hypotonia), experience feeding difficulties, and tend to grow more slowly  than  other infants. Most affected individuals have fused second and third toes (syndactyly), and some have extra fingers or toes (polydactyly).  The signs and symptoms of Smith-Lemli-Opitz syndrome vary widely. Mildly affected individuals may have only minor physical abnormalities  with learning and behavioral problems. Severe cases can be life-threatening and involve profound intellectual  disability and major physical abnormalities.    Capps-Lemli-Opitz syndrome affects an estimated 1 in 20,000. It is an autosomal recessive disorder. We discussed that typically most parents  need to be carriers to have an affected child. Carriers are typically asymptomatic.    Mutations in the DHCR7 gene cause Capps-Lemli-Opitz syndrome.  The DHCR7 gene provides instructions for making an enzyme called 7-dehydrocholesterol reductase. This enzyme is responsible for the final  step in the production of cholesterol.  Cholesterol is a waxy, fat-like substance that is produced in the body and obtained from foods that come  from animals (particularly egg yolks, meat, poultry, fish, and dairy products). Cholesterol is necessary for normal embryonic development and  has important functions both before and after birth. It is a structural component  of cell membranes and the protective substance covering nerve cells (myelin). Additionally, cholesterol plays a role in the production of certain  hormones and digestive acids.  Mutations in the DHCR7 gene reduce or eliminate the activity of 7-dehydrocholesterol reductase, preventing cells from producing enough  cholesterol. A lack of this enzyme also allows potentially toxic byproducts of cholesterol production to build up in the blood, nervous system,  and other tissues. The combination of low cholesterol levels and an accumulation of other substances likely  disrupts the growth and development of many body systems. It is not known, however, how this disturbance in cholesterol production leads to  the specific features of Capps-Lemli-Opitz syndrome.    Pt was counseled about the abnormal Quad screen and the increased risk of SLOS Syndrome. We discussed the limitations of ultrasound in  the definitive diagnosis of SLOS Syndrome and we discussed amniocentesis as method to assess for the diagnosis.    Interestingly, pt notes that her race is listed as  and not . We called [facility] and they do not have a space for  but  will check to see if this variable could influence results of SLOS screening  .    Impression  =========  QUAD screen reported elevated risk for SLOS  Normal fetal exam  Normal fetal growth,  Normal amniotic fluid volume.  Normal placental location without evidence of previa.,  Normal cervical length.    Recommendation  ==============  Will re-check fetal growth and development in 4 weeks  Waiting to hear from [facility] about ethnic influence on screen  results.

## 2018-11-02 ENCOUNTER — PATIENT MESSAGE (OUTPATIENT)
Dept: MATERNAL FETAL MEDICINE | Facility: CLINIC | Age: 23
End: 2018-11-02

## 2018-11-02 ENCOUNTER — TELEPHONE (OUTPATIENT)
Dept: MATERNAL FETAL MEDICINE | Facility: CLINIC | Age: 23
End: 2018-11-02

## 2018-11-02 NOTE — TELEPHONE ENCOUNTER
Patient was seen yesterday by Dr. Metcalf for an abnormal quad screen.  She noted at that time that F necessity was incorrect and it was subsequently recalculated.  On the recalculation the Capps-Lemli-Opitz risk was 1 to 13 but the trisomy 18 risk increased from 1 in 110 to 1 in 100 and thus became screen positive.  I discussed this on the phone with  Her and explained the significance.  We discussed options for further evaluation including cell free DNA screening.  I also explained that the normal targeted ultrasound that she had decreases the risk of both of those disorders but does not eliminated.  We provided her with information so that she could determine her out-of-pocket expense for cell free DNA screening.  She will notify us if she wishes to have it drawn.  A follow-up appointment has already been scheduled for a few weeks from now to complete the anatomy that could not be visualized.  She was told to contact our office with any questions or problems.

## 2018-11-09 LAB
# FETUSES US: ABNORMAL
2ND TRIMESTER 4 SCREEN PNL SERPL: POSITIVE
2ND TRIMESTER 4 SCREEN SERPL-IMP: ABNORMAL
AFP MOM SERPL: 1.08
AFP SERPL-MCNC: 40.1 NG/ML
AGE AT DELIVERY: 21
B-HCG MOM SERPL: 0.5
B-HCG SERPL-ACNC: 10.9 IU/ML
FET TS 21 RISK FROM MAT AGE: ABNORMAL
GA (DAYS): 6 D
GA (WEEKS): 17 WK
GA METHOD: ABNORMAL
IDDM PATIENT QL: ABNORMAL
INHIBIN A MOM SERPL: 0.81
INHIBIN A SERPL-MCNC: 130.5 PG/ML
SMOKING STATUS FTND: NO
TS 18 RISK FETUS: ABNORMAL
TS 21 RISK FETUS: ABNORMAL
U ESTRIOL MOM SERPL: 0.34
U ESTRIOL SERPL-MCNC: 0.39 NG/ML

## 2018-11-14 NOTE — TELEPHONE ENCOUNTER
"----- Message from Ramila Morales sent at 10/29/2018 10:10 AM CDT -----  Contact: self  [phone]  Pt is requesting to speak to you as to why she is referred to another doctor, "is there something wrong ". Pls call to [phone]. Thanks.......Belén  -----------------------------------------------------------  10/29/18 @ 7182 (ERNESTO)  RETURNED CALL TO [facility]ALL[facility], INFORMED HER THAT THE REASON SHE IS SEEING ANOTHER DR , IS THAT HER MATERNAL QUAD SCREEN IS ABN,BUT SHE SHOULD NOT WORRY BECAUSE WE SEND ALL THE NEW MOM'S TO GET CHECKED IF THE LEVELS ARE A LITTLE HIGH, INSTRUCTED PT TO CALL US AFTER THE TEST AND LET US KNOW WHAT THE DR TOLD HER , PT STATED HER UNDERSTANDING  " -ultrasound gallbladder   -miralax as needed   -we did discuss the possibility of functional pain, or symptoms related to personality type, stress, anxiety etc

## 2018-11-16 ENCOUNTER — ROUTINE PRENATAL (OUTPATIENT)
Dept: OBSTETRICS AND GYNECOLOGY | Facility: CLINIC | Age: 23
End: 2018-11-16
Payer: COMMERCIAL

## 2018-11-16 VITALS
SYSTOLIC BLOOD PRESSURE: 110 MMHG | WEIGHT: 184.06 LBS | DIASTOLIC BLOOD PRESSURE: 70 MMHG | BODY MASS INDEX: 32.61 KG/M2

## 2018-11-16 DIAGNOSIS — O28.0 ABNORMAL QUAD SCREEN: ICD-10-CM

## 2018-11-16 DIAGNOSIS — Z3A.20 20 WEEKS GESTATION OF PREGNANCY: Primary | ICD-10-CM

## 2018-11-16 PROCEDURE — 99999 PR PBB SHADOW E&M-EST. PATIENT-LVL II: CPT | Mod: PBBFAC,,, | Performed by: OBSTETRICS & GYNECOLOGY

## 2018-11-16 PROCEDURE — 0502F SUBSEQUENT PRENATAL CARE: CPT | Mod: S$GLB,,, | Performed by: OBSTETRICS & GYNECOLOGY

## 2018-11-16 NOTE — PROGRESS NOTES
20w4d here for OB visit.    Pregnancy complicated by abnormal quad screen.    Pt has no major complaints today.  Reports active fetus.  Denies vaginal bleeding, leakage of fluid, or contractions.    Pt seen MFM for abnormal quad screen.  MFM u/s shows normal appearing fetus.  Pt is undecided on [id number] at this time.  Pt has f/u with MFM on 11/28.  Pt states she will not terminate pregnancy for any reasons.    Pt was treated for chlamydia and asxs bacteriuria in previous visits.  Pt unable to leave a sufficient urine sample today for cx.  Will place a standing order for pt to leave at lab when ready.  Timely testing advised.  Pt denies UTI sxs or vaginal discharge.    Precautions reviewed.  Return 4 wks or sooner prn.  Voiced understanding.  Significant other present for visit.

## 2018-11-28 ENCOUNTER — HOSPITAL ENCOUNTER (OUTPATIENT)
Facility: HOSPITAL | Age: 23
Discharge: HOME | End: 2018-11-28
Attending: OBSTETRICS & GYNECOLOGY | Admitting: OBSTETRICS & GYNECOLOGY
Payer: COMMERCIAL

## 2018-11-28 ENCOUNTER — HOSPITAL ENCOUNTER (OUTPATIENT)
Dept: PERINATAL CARE | Facility: HOSPITAL | Age: 23
Discharge: HOME | End: 2018-11-28
Attending: OBSTETRICS & GYNECOLOGY
Payer: COMMERCIAL

## 2018-11-28 ENCOUNTER — TELEPHONE (OUTPATIENT)
Dept: PEDIATRIC CARDIOLOGY | Facility: CLINIC | Age: 23
End: 2018-11-28

## 2018-11-28 VITALS
SYSTOLIC BLOOD PRESSURE: 107 MMHG | HEART RATE: 82 BPM | WEIGHT: 184.06 LBS | BODY MASS INDEX: 32.61 KG/M2 | HEIGHT: 63 IN | DIASTOLIC BLOOD PRESSURE: 59 MMHG

## 2018-11-28 DIAGNOSIS — N30.90 CYSTITIS: Primary | ICD-10-CM

## 2018-11-28 DIAGNOSIS — O28.8: Primary | ICD-10-CM

## 2018-11-28 DIAGNOSIS — O28.9 ABNORMAL FINDING ON ANTENATAL SCREEN: ICD-10-CM

## 2018-11-28 DIAGNOSIS — N30.90 CYSTITIS: ICD-10-CM

## 2018-11-28 DIAGNOSIS — Z3A.22 22 WEEKS GESTATION OF PREGNANCY: Primary | ICD-10-CM

## 2018-11-28 DIAGNOSIS — Z34.90 PREGNANCY: ICD-10-CM

## 2018-11-28 LAB
BACTERIA #/AREA URNS HPF: ABNORMAL /HPF
BILIRUB UR QL STRIP: NEGATIVE
CLARITY UR: ABNORMAL
COLOR UR: YELLOW
GLUCOSE UR QL STRIP: NEGATIVE
HGB UR QL STRIP: NEGATIVE
KETONES UR QL STRIP: NEGATIVE
LEUKOCYTE ESTERASE UR QL STRIP: NEGATIVE
MICROSCOPIC COMMENT: ABNORMAL
NITRITE UR QL STRIP: POSITIVE
PH UR STRIP: 8 [PH] (ref 5–8)
PROT UR QL STRIP: NEGATIVE
SP GR UR STRIP: 1.01 (ref 1–1.03)
URN SPEC COLLECT METH UR: ABNORMAL
UROBILINOGEN UR STRIP-ACNC: NEGATIVE EU/DL
WBC #/AREA URNS HPF: 3 /HPF (ref 0–5)

## 2018-11-28 PROCEDURE — 87086 URINE CULTURE/COLONY COUNT: CPT

## 2018-11-28 PROCEDURE — 87591 N.GONORRHOEAE DNA AMP PROB: CPT

## 2018-11-28 PROCEDURE — 87491 CHLMYD TRACH DNA AMP PROBE: CPT

## 2018-11-28 PROCEDURE — 87077 CULTURE AEROBIC IDENTIFY: CPT

## 2018-11-28 PROCEDURE — 87088 URINE BACTERIA CULTURE: CPT

## 2018-11-28 PROCEDURE — 76816 OB US FOLLOW-UP PER FETUS: CPT | Mod: 26,,, | Performed by: OBSTETRICS & GYNECOLOGY

## 2018-11-28 PROCEDURE — 25000003 PHARM REV CODE 250: Performed by: OBSTETRICS & GYNECOLOGY

## 2018-11-28 PROCEDURE — 81000 URINALYSIS NONAUTO W/SCOPE: CPT

## 2018-11-28 PROCEDURE — 99213 OFFICE O/P EST LOW 20 MIN: CPT | Mod: 25,,, | Performed by: OBSTETRICS & GYNECOLOGY

## 2018-11-28 PROCEDURE — 76816 OB US FOLLOW-UP PER FETUS: CPT

## 2018-11-28 PROCEDURE — 87186 SC STD MICRODIL/AGAR DIL: CPT

## 2018-11-28 RX ORDER — NITROFURANTOIN 25; 75 MG/1; MG/1
100 CAPSULE ORAL 2 TIMES DAILY
Qty: 14 CAPSULE | Refills: 0 | Status: SHIPPED | OUTPATIENT
Start: 2018-11-28 | End: 2018-11-28 | Stop reason: SDUPTHER

## 2018-11-28 RX ORDER — NITROFURANTOIN 25; 75 MG/1; MG/1
100 CAPSULE ORAL EVERY 12 HOURS
Status: DISCONTINUED | OUTPATIENT
Start: 2018-11-28 | End: 2018-11-28 | Stop reason: HOSPADM

## 2018-11-28 RX ORDER — NITROFURANTOIN 25; 75 MG/1; MG/1
100 CAPSULE ORAL 2 TIMES DAILY
Qty: 14 CAPSULE | Refills: 0 | Status: SHIPPED | OUTPATIENT
Start: 2018-11-28 | End: 2018-12-05

## 2018-11-28 RX ADMIN — NITROFURANTOIN MONOHYDRATE/MACROCRYSTALLINE 100 MG: 25; 75 CAPSULE ORAL at 12:11

## 2018-11-28 NOTE — TELEPHONE ENCOUNTER
Attempted to call pt and number disconnected. Only number on file. Will schedule fetal echo appointment for 12/12/18 @2 PM at [facility] and send appt reminder slip to address on file.

## 2018-11-28 NOTE — PROGRESS NOTES
"[facility]  Progress Note  Obstetrics & Gynecology    Date:  2018    Chief complaint: Lower back pain     Subjective:    Talia Marin is a 23 y.o.  at 22w2d gestation who presents to L&D c/o for lower back pain.  Pt was seen in [facility] office this AM and sent to L&D due to concern for lower back pain, rule out UTI.  Pt denies dysuria, frequency, fevers, pyuria, hematuria, renal stones, or suprapubic/CVA tenderness.  UA suggestive of UTI.  Pt denies any contractions, vaginal bleeding, leakage of fluid, and notes active fetal movement.  Pt antepartum course has been remarkable fpr abnormal quad screen otherwise has been fairly uneventful.    Review of Systems:      Constitutional:  No fever, fatigue  HENT:  No headaches or visual disturbance  Respiratory:  No cough, shortness of breath  Cardiovascular:  No chest pain, leg swelling  Gastrointestinal:  No abdominal pain, nausea, vomiting, constipation, blood in stool   Genitourinary:  No dysuria, frequency  Musculoskeletal:  No back pain, arthralgias  Skin:  No rash, jaundice  Neurological:  No dizziness, weakness, headaches  Psychiatric/Behavioral:  No sleep disturbance, dysphoric mood    Objective:    BP (!) 107/59   Pulse 82   Ht 5' 3" (1.6 m)   Wt 83.5 kg (184 lb 1.4 oz)   LMP 2018   BMI 32.61 kg/m²      Physical Exam:   Constitutional: She appears well-developed. No distress.                             HEENT:  NCAT, anicteric, moist mucus membranes, neck supple without masses.  LUNGS:  Clear to auscultation bilaterally.  HEART:  Regular rate & rhythm with physiologic heart sounds.  ABDOMEN:  Soft, non tender without any guarding, rigidity or rebound. Normoactive bowel sounds.  Size = dates. Mild diffuse tenderness in lower back.  No CVA or suprapubic tenderness.  PELVIC:  Cervix closed, thick, high, posterior.  EXTREMITIES:  Symmetric without cramping, claudication, or edema LE. +2 distal pulses.  FROM.  SKIN:  No " rashes, good turgor & capillary refill.  NEUROLOGIC:  Grossly intact bilaterally.  PSYCH:  Mood & affect appropriate.       's  [place] no ctx    Component      Latest Ref Rng & Units 2018   Specimen UA       Urine, Clean Catch   Color, UA      Yellow, Straw, Coni Yellow   Appearance, UA      Clear Hazy (A)   pH, UA      5.0 - 8.0 8.0   Specific Gravity, UA      1.005 - 1.030 1.015   Protein, UA      Negative Negative   Glucose, UA      Negative Negative   Ketones, UA      Negative Negative   Bilirubin (UA)      Negative Negative   Occult Blood UA      Negative Negative   Nitrite, UA      Negative Positive (A)   Urobilinogen, UA      <2.0 EU/dL Negative   Leukocytes, UA      Negative Negative     Component      Latest Ref Rng & Units 2018   WBC, UA      0 - 5 /hpf 3   Bacteria, UA      None-Occ /hpf Many (A)   Microscopic Comment       SEE COMMENT     Hospital Course:      Pt was observed on L&D, received supportive care, and had no acute events.  Labor, acute pyelonephritis ruled out.  UA showed evidence of UTI.  Pt started on macrobid pending urine cx.  Pt lower back cramping improved prior to discharge.  Maternal and fetal status was overall reassuring.  Pt was discharged in stable condition.     Assessment:    1. 23 y.o.  at 22w2d  2. Lower back/round ligament pain  3. UTI suggestive on UA    Plan:    Discharge home.     Labor/UTI precautions.    Supportive care for round ligament pain, tylenol prn.     Empiric antibx therapy with macrobid 100 mg bid x 7 days for UTI pending urine cx.  Increase fluids. UTI precautions and hygiene advice.    F/u in clinic as previously scheduled, or sooner as needed.     Return to L&D if symptoms worsen, vaginal bleeding, leakage of fluid, contractions, decreased fetal movement, or any concerns.       All of her questions were answered to her satisfaction.  Pt voiced understanding and acceptance of hospital discharge.       George Robertson MD

## 2018-11-28 NOTE — DISCHARGE INSTRUCTIONS
Understanding Preeclampsia  Preeclampsia is pregnancy-related high blood pressure that develops after 20 weeks' gestation. It can lead to health risks for you and your baby. No one knows what causes preeclampsia. But it is known that the only cure is delivery.    Signs and symptoms  A common sign of preeclampsia is high blood pressure. Other signs and symptoms may include:  · Rapid weight gain (more than 5 pounds in a week)  · Feeling nauseous; throwing up  · Protein in your urine  · Headache  · Stomach pain   · Vision problems (seeing flashes or spots)  · Edema (swelling) in your face or hands (this also commonly happens near the end of normal pregnancies, even without preeclampsia)    When to call your healthcare provider  Call your healthcare provider if swelling, weight gain, or other symptoms come on quickly or are severe. Some cases of preeclampsia are more severe than others. Your signs and symptoms also may change or worsen as you get closer to your due date.    Dangers of preeclampsia  If not treated, preeclampsia can cause problems for you and your baby. The placenta (organ that nourishes your baby) may tear away from the uterine wall. This can lead to fetal distress (the baby is at risk for health problems) and premature delivery. Preeclampsia can also cause these health problems:  · Kidney failure or other organ damage  · Seizures  · Stroke  · Death  · Premature delivery              Home Undelivered Discharge Instructions    After Discharge Orders:    Future Appointments   Date Time Provider Department Center   12/14/2018  9:30 AM George Robertson MD [facility]   1/9/2019  1:00 PM ULTRASOUND,  PERINATOLOGY [facility]       Call physician or midwife's office for instructions.    Current Discharge Medication List      START taking these medications    Details   nitrofurantoin, macrocrystal-monohydrate, (MACROBID) 100 MG capsule Take 1 capsule (100 mg total) by mouth 2 (two)  times daily. for 7 days  Qty: 14 capsule, Refills: 0    Associated Diagnoses: Cystitis         CONTINUE these medications which have NOT CHANGED    Details   ondansetron (ZOFRAN-ODT) 4 MG TbDL Take 1 tablet (4 mg total) by mouth every 6 (six) hours as needed (nausea).  Qty: 30 tablet, Refills: 0    Associated Diagnoses: Nausea and vomiting during pregnancy      prenatal vits62/FA/om3/dha/epa (PRENATAL GUMMY ORAL) Take by mouth.                     · Diet:  normal diet as tolerated    · Rest: normal activity as tolerated    Other instructions: Do kick counts once a day on your baby. Choose the time of day your baby is most active. Get in a comfortable lying or sitting position and time how long it takes to feel 10 kicks, twists, turns, swishes, or rolls. Call your physician or midwife if there have not been 10 kicks in 1 hours    Call physician or midwife, return to Labor and Delivery, call 911, or go to the nearest Emergency Room if: increased leakage or fluid, contractions more than  10 per  1 hour, decreased fetal movement, persistent low back pain or cramping, bleeding from vaginal area, difficulty urinating, pain with urination, difficulty breathing, new calf pain, persistent headache or vision change

## 2018-11-28 NOTE — ADDENDUM NOTE
Encounter addended by: Ewa Alexandra RN on: 11/28/2018 3:24 PM   Actions taken: Charge Capture section accepted

## 2018-11-28 NOTE — PROGRESS NOTES
"Indication  ========    Follow-up evaluation of anatomy. Evaluation of fetal growth.    History  ======    Previous Outcomes  Preg. no. 1  Outcome: Spontaneous miscarriage  Details:   Preg. no. 2  Outcome: Live YOB: 2010  Gender: male  Details: term, 7lbs, , "Christopher"  Preg. no. 3  Outcome: Live YOB: 2013  Gender: female  Details: term, 6lbs, , " Nidhi"   4  Para 2  Abortions (A) 1  Izquierdo living children (L) 2  Risk Factors  Details: kidney infections  Details: D&C    Pregnancy History  ==============    Maternal Lab Tests  Test: Quad/ Penta Screen  Result: positive SLOS 1:9  DSR 1:[id number]  T18 1:110  AFP = 1.06  Wants to know gender: yes    Maternal Assessment  =================    Weight 84 kg  Weight (lb) 186 lb  BP syst 115 mmHg  BP diast 60 mmHg    Method  ======    2D Color Doppler, , Voluson E6, Transabdominal ultrasound examination. View: Suboptimal view: limited by fetal position.    Pregnancy  =========    Izquierdo pregnancy. Number of fetuses: 1.    Dating  ======    Cycle: regular cycle  Ultrasound examination on: 2018  GA by U/S based upon: AC, BPD, Femur, HC  GA by U/S 23 w + 2 d  SARI by U/S: 3/25/2019  Assigned: Dating performed on 2018, based on the LMP  Assigned GA 22 w + 2 d  Assigned SARI: 2019    General Evaluation  ==============    Cardiac activity: present.  bpm.  Fetal movements: visualized.  Presentation: breech.  Placenta: anterior.  Amniotic fluid: normal amount.    Fetal Biometry  ============    Fetal Biometry  BPD 54.7 mm 22w 5d Hadlock  OFD 74.6 mm 24w 5d Felisha  .7 mm 23w 0d Hadlock  .1 mm 24w 0d Hadlock  Femur 40.8 mm 23w 2d Hadlock   g 63% Fredi  Calculated by: Hadlock (BPD-HC-AC-FL)  EFW (lb) 1 lb  EFW (oz) 5 oz  Cephalic index 0.73  HC / AC 1.09  FL / BPD 0.75  FL / AC 0.21   bpm    Fetal " Anatomy  ============    Cranium: normal  Profile: normal  Maxilla: normal  Mandible: normal  4-chamber view: normal  RVOT: normal  LVOT: suboptimal  Aortic arch: normal  3-vessel-trachea view: normal  Stomach: normal  Kidneys: normal  Bladder: normal  Cervical spine: suboptimal  Thoracic spine: suboptimal  Lumbar spine: normal  Sacral spine: normal  Rt hand: visualized  Rt hand: open  Lt hand: visualized  Lt hand: closed  Gender: male  Wants to know gender: yes  Other: A full anatomic survey has been previously performed.    Consultation  ==========    Type: Followup.  Patient aware of screen positive for SLOS and T18.  We again reviewed the features of each condition and the limitations of ultrasound in detecting these conditions.  We again discussed the options of NIPT and amniocentesis which the patient currently declines.  Patient aware of potential for adverse pregnancy outcomes with abnormal analytes.  Aware of recommendation of fetal echo.    Patient also reported lower mid back pain that goes to her flank. She reports she was treated for UTI in prior pregnancy. She reports this was in  [place]. She called it a kidney infection rather than a bladder infection and was not hospitalized overnight so it is unclear if it was a true  pyelonephritis. She has some minimal right flank pain but more when asked about discomfort. She denies any significant pelvic pain. She has  rare cramping and denies leakage of fluid. I spoke to Dr. Robertson and the patient was sent to L and D for further evaluation of her back pain and  to confirm no evidence of pyelonephritis, other renal issue or obstetric etiology of pain. If evidence of pyelonephritis or concerns of recurrent  UTI, treatment and suppression would be needed. Please call MFM if any questions.    15 minutes was spent in face to face time with greater than half of that time spent in counseling and coordination of care.      Impression  =========    Izquierdo live  intrauterine pregnancy.  Overall normal fetal growth.  Normal amniotic fluid volume.  Some of the fetal anatomy remains suboptimally visualized. The fetal anatomy that was seen appears normal.    Recommendation  ==============    To L and D for evaluation due to back pain (discussed with Dr. Robertson).  Fetal echo ordered due to screen positive for SLOS and Trisomy 18 and suboptimal cardiac views.  Patient given brochure for NIPT-thus far declines NIPT and amniocentesis.  Follow up ultrasound with MFM in 6 weeks for growth and suboptimal fetal anatomy.  Please see prior consult notes for details and see note above.

## 2018-11-29 ENCOUNTER — TELEPHONE (OUTPATIENT)
Dept: OBSTETRICS AND GYNECOLOGY | Facility: CLINIC | Age: 23
End: 2018-11-29

## 2018-11-29 NOTE — TELEPHONE ENCOUNTER
----- Message from Doris Giles sent at 11/29/2018 12:48 PM CST -----  Contact: Self/ [phone]  Pt calling to discuss FMLA paper work to be filled out. Thank you.      Patient is aware that we will complete her FMLA paperwork with in 72 hours of her dropping it off to Dr George Robertson

## 2018-11-30 LAB
BACTERIA UR CULT: NORMAL
C TRACH DNA SPEC QL NAA+PROBE: NOT DETECTED
N GONORRHOEA DNA SPEC QL NAA+PROBE: NOT DETECTED

## 2018-12-14 ENCOUNTER — ROUTINE PRENATAL (OUTPATIENT)
Dept: OBSTETRICS AND GYNECOLOGY | Facility: CLINIC | Age: 23
End: 2018-12-14
Payer: COMMERCIAL

## 2018-12-14 ENCOUNTER — CLINICAL SUPPORT (OUTPATIENT)
Dept: PEDIATRIC CARDIOLOGY | Facility: CLINIC | Age: 23
End: 2018-12-14
Attending: PEDIATRICS
Payer: COMMERCIAL

## 2018-12-14 ENCOUNTER — OFFICE VISIT (OUTPATIENT)
Dept: PEDIATRIC CARDIOLOGY | Facility: CLINIC | Age: 23
End: 2018-12-14
Payer: COMMERCIAL

## 2018-12-14 VITALS
WEIGHT: 187.25 LBS | SYSTOLIC BLOOD PRESSURE: 118 MMHG | DIASTOLIC BLOOD PRESSURE: 59 MMHG | BODY MASS INDEX: 33.18 KG/M2

## 2018-12-14 VITALS
DIASTOLIC BLOOD PRESSURE: 65 MMHG | HEART RATE: 80 BPM | WEIGHT: 190.06 LBS | HEIGHT: 63 IN | BODY MASS INDEX: 33.68 KG/M2 | SYSTOLIC BLOOD PRESSURE: 120 MMHG

## 2018-12-14 DIAGNOSIS — O28.9 ABNORMAL FINDING ON ANTENATAL SCREEN: Primary | ICD-10-CM

## 2018-12-14 DIAGNOSIS — Z3A.24 24 WEEKS GESTATION OF PREGNANCY: Primary | ICD-10-CM

## 2018-12-14 DIAGNOSIS — Z03.73 FETAL ANOMALY SUSPECTED BUT NOT FOUND: ICD-10-CM

## 2018-12-14 DIAGNOSIS — O28.8: ICD-10-CM

## 2018-12-14 PROCEDURE — 76827 ECHO EXAM OF FETAL HEART: CPT | Mod: S$GLB,,, | Performed by: PEDIATRICS

## 2018-12-14 PROCEDURE — 87077 CULTURE AEROBIC IDENTIFY: CPT

## 2018-12-14 PROCEDURE — 87186 SC STD MICRODIL/AGAR DIL: CPT

## 2018-12-14 PROCEDURE — 76825 ECHO EXAM OF FETAL HEART: CPT | Mod: S$GLB,,, | Performed by: PEDIATRICS

## 2018-12-14 PROCEDURE — 87086 URINE CULTURE/COLONY COUNT: CPT

## 2018-12-14 PROCEDURE — 87088 URINE BACTERIA CULTURE: CPT

## 2018-12-14 PROCEDURE — 99999 PR PBB SHADOW E&M-EST. PATIENT-LVL III: CPT | Mod: PBBFAC,,, | Performed by: PEDIATRICS

## 2018-12-14 PROCEDURE — 99999 PR PBB SHADOW E&M-EST. PATIENT-LVL I: CPT | Mod: PBBFAC,,,

## 2018-12-14 PROCEDURE — 99242 OFF/OP CONSLTJ NEW/EST SF 20: CPT | Mod: 25,S$GLB,, | Performed by: PEDIATRICS

## 2018-12-14 PROCEDURE — 93325 DOPPLER ECHO COLOR FLOW MAPG: CPT | Mod: S$GLB,,, | Performed by: PEDIATRICS

## 2018-12-14 PROCEDURE — 99999 PR PBB SHADOW E&M-EST. PATIENT-LVL II: CPT | Mod: PBBFAC,,, | Performed by: OBSTETRICS & GYNECOLOGY

## 2018-12-14 PROCEDURE — 0502F SUBSEQUENT PRENATAL CARE: CPT | Mod: S$GLB,,, | Performed by: OBSTETRICS & GYNECOLOGY

## 2018-12-14 NOTE — PROGRESS NOTES
24w4d here for OB visit.    Pregnancy complicated by abnormal quad screen.    No major complaints today.  Reports active fetus.  Denies vaginal bleeding, leakage of fluid, or contractions.    Pt seen MFM for abnormal quad screen.  MFM u/s shows normal appearing fetus.  Pt is not interested in [id number] at this time.  Pt has f/u with MFM on 1/9.    Pt was treated for chlamydia with negative test of cure.  Pt also treated for asxs bacteriuria in previous visit, repeat urine urine cx today.  Pt denies UTI sxs or vaginal discharge.    Order 1 hr glucola, CBC.  Labor/preE precautions.  Kick counts.  Return 4 wks or sooner prn.  Voiced understanding.

## 2018-12-14 NOTE — PROGRESS NOTES
[facility] Pediatric Cardiology Fetal Cardiology Clinic    Today, I had the pleasure of evaluating Talia Marin who is now 23 y.o. and carrying her fourth pregnancy at 24 4/7 weeks gestation with an SARI of 19. She was referred for evaluation of the fetal heart due a concern for fetal Capps Lemli Optiz syndrome vs trisomy 18..      She is carrying a male fetus, named Kunal.      Obstetric History:    .  Her second pregnancy resulted in a miscarriage.  Her first and third were term, vaginal deliveries.  Her OB care is by Dr. George Robertson.  Her M care is by Dr. Pelaez.     Past Medical History:   Diagnosis Date    Anemia          Current Outpatient Medications:     prenatal vits62/FA/om3/dha/epa (PRENATAL GUMMY ORAL), Take by mouth., Disp: , Rfl:     ondansetron (ZOFRAN-ODT) 4 MG TbDL, Take 1 tablet (4 mg total) by mouth every 6 (six) hours as needed (nausea)., Disp: 30 tablet, Rfl: 0    Family History: Negative for congenital heart disease, early coronary artery disease, sudden unexplained death, connective tissues disorders, genetic syndromes, multiple miscarriages or other congenital anomalies.    Social History: Ms. Talia Marin is involved with the father of the baby.  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    FETAL ECHOCARDIOGRAM (summary):  Fetal echocardiogram at 24 4/7 weeks gestation for a concern for fetal Capps  Lemli Optiz and trisomy 18. SARI 19.  Normally connected heart.  No ectopy or sustained arrhythmia demonstrated throughout the study.  Normal fetal atrial and ductal level shunting.  No ventricular level shunting.  Normal atrioventricular and semilunar valve structure and function.  Normal ductal and aortic arches.  Normal biventricular size and systolic function.  No pericardial effusion.  (Full report in electronic medical record)    Impression:  Single active male fetus at 24 wga.  Normal fetal echocardiogram.      Todays fetal echocardiogram is normal,  within the limitations of fetal echocardiography.  I discussed with her that fetal echocardiography is insufficiently sensitive to rule out all septal defects, anomalies of pulmonary and systemic veins, arch anomalies, and some valvar abnormalities, nor can it ensure that the ductus arteriosus and foramen ovale will spontaneously close.     Recommendations:  Location, timing, and mode of delivery will be determined by the obstetrical team.  She does not require further follow-up in the fetal echocardiography clinic, but I would be happy to see her again if additional questions or concerns arise.    Should there be any concerns about the baby's heart after birth, a post-rene echocardiogram and cardiology consultation are recommended.     The above information was discussed in detail including the use of diagrams, with 30 minutes of total face to face time, with greater than 50% with counseling and coordination of care.  The discussion of the diagnosis and treatment options is as described above.      Israel Maier MD, MPH  Pediatric and Fetal Cardiology  [facility]   [address] [place] [address]    Office: [phone]  Cell: [phone]

## 2018-12-16 LAB — BACTERIA UR CULT: NORMAL

## 2018-12-17 DIAGNOSIS — O99.891 ASYMPTOMATIC BACTERIURIA DURING PREGNANCY IN SECOND TRIMESTER: Primary | ICD-10-CM

## 2018-12-17 DIAGNOSIS — R82.71 ASYMPTOMATIC BACTERIURIA DURING PREGNANCY IN SECOND TRIMESTER: Primary | ICD-10-CM

## 2018-12-17 RX ORDER — CEPHALEXIN 500 MG/1
500 CAPSULE ORAL EVERY 12 HOURS
Qty: 14 CAPSULE | Refills: 0 | Status: SHIPPED | OUTPATIENT
Start: 2018-12-17 | End: 2018-12-24

## 2019-01-09 ENCOUNTER — HOSPITAL ENCOUNTER (OUTPATIENT)
Dept: PERINATAL CARE | Facility: HOSPITAL | Age: 24
Discharge: HOME | End: 2019-01-09
Attending: OBSTETRICS & GYNECOLOGY
Payer: COMMERCIAL

## 2019-01-09 DIAGNOSIS — O26.90 PREGNANCY COMPLICATION: ICD-10-CM

## 2019-01-09 DIAGNOSIS — O28.0 ABNORMAL MATERNAL SERUM SCREENING TEST: ICD-10-CM

## 2019-01-09 DIAGNOSIS — O36.63X0 ENCOUNTER FOR MATERNAL CARE FOR EXCESSIVE FETAL GROWTH IN THIRD TRIMESTER, SINGLE OR UNSPECIFIED FETUS: ICD-10-CM

## 2019-01-09 DIAGNOSIS — O26.90 PREGNANCY COMPLICATION: Primary | ICD-10-CM

## 2019-01-09 PROCEDURE — 76816 OB US FOLLOW-UP PER FETUS: CPT

## 2019-01-09 PROCEDURE — 76816 OB US FOLLOW-UP PER FETUS: CPT | Mod: 26,,, | Performed by: OBSTETRICS & GYNECOLOGY

## 2019-01-09 PROCEDURE — 76816 PR  US,PREGNANT UTERUS,F/U,TRANSABD APP: ICD-10-PCS | Mod: 26,,, | Performed by: OBSTETRICS & GYNECOLOGY

## 2019-01-09 NOTE — ADDENDUM NOTE
Encounter addended by: Ewa Alexandra RN on: 1/9/2019 1:46 PM   Actions taken: Charge Capture section accepted

## 2019-01-10 ENCOUNTER — LAB VISIT (OUTPATIENT)
Dept: LAB | Facility: HOSPITAL | Age: 24
End: 2019-01-10
Attending: OBSTETRICS & GYNECOLOGY
Payer: COMMERCIAL

## 2019-01-10 DIAGNOSIS — Z3A.24 24 WEEKS GESTATION OF PREGNANCY: ICD-10-CM

## 2019-01-10 LAB
BASOPHILS # BLD AUTO: 0.02 K/UL
BASOPHILS NFR BLD: 0.2 %
DIFFERENTIAL METHOD: ABNORMAL
EOSINOPHIL # BLD AUTO: 0.1 K/UL
EOSINOPHIL NFR BLD: 0.6 %
ERYTHROCYTE [DISTWIDTH] IN BLOOD BY AUTOMATED COUNT: 12.8 %
GLUCOSE SERPL-MCNC: 129 MG/DL
HCT VFR BLD AUTO: 31.2 %
HGB BLD-MCNC: 10.4 G/DL
LYMPHOCYTES # BLD AUTO: 2.2 K/UL
LYMPHOCYTES NFR BLD: 23 %
MCH RBC QN AUTO: 28.6 PG
MCHC RBC AUTO-ENTMCNC: 33.3 G/DL
MCV RBC AUTO: 86 FL
MONOCYTES # BLD AUTO: 0.6 K/UL
MONOCYTES NFR BLD: 5.7 %
NEUTROPHILS # BLD AUTO: 6.8 K/UL
NEUTROPHILS NFR BLD: 70.5 %
PLATELET # BLD AUTO: 318 K/UL
PMV BLD AUTO: 8.9 FL
RBC # BLD AUTO: 3.64 M/UL
WBC # BLD AUTO: 9.59 K/UL

## 2019-01-10 PROCEDURE — 36415 COLL VENOUS BLD VENIPUNCTURE: CPT

## 2019-01-10 PROCEDURE — 85025 COMPLETE CBC W/AUTO DIFF WBC: CPT

## 2019-01-10 PROCEDURE — 82950 GLUCOSE TEST: CPT

## 2019-01-11 ENCOUNTER — CLINICAL SUPPORT (OUTPATIENT)
Dept: OBSTETRICS AND GYNECOLOGY | Facility: CLINIC | Age: 24
End: 2019-01-11
Payer: COMMERCIAL

## 2019-01-11 ENCOUNTER — ROUTINE PRENATAL (OUTPATIENT)
Dept: OBSTETRICS AND GYNECOLOGY | Facility: CLINIC | Age: 24
End: 2019-01-11
Payer: COMMERCIAL

## 2019-01-11 VITALS
WEIGHT: 191.56 LBS | BODY MASS INDEX: 33.95 KG/M2 | DIASTOLIC BLOOD PRESSURE: 74 MMHG | SYSTOLIC BLOOD PRESSURE: 110 MMHG | DIASTOLIC BLOOD PRESSURE: 74 MMHG | BODY MASS INDEX: 33.95 KG/M2 | WEIGHT: 191.56 LBS | SYSTOLIC BLOOD PRESSURE: 110 MMHG

## 2019-01-11 DIAGNOSIS — Z23 NEED FOR TDAP VACCINATION: Primary | ICD-10-CM

## 2019-01-11 DIAGNOSIS — O99.013 ANEMIA AFFECTING PREGNANCY IN THIRD TRIMESTER: ICD-10-CM

## 2019-01-11 DIAGNOSIS — Z3A.28 28 WEEKS GESTATION OF PREGNANCY: Primary | ICD-10-CM

## 2019-01-11 PROCEDURE — 90471 IMMUNIZATION ADMIN: CPT | Mod: S$GLB,,, | Performed by: OBSTETRICS & GYNECOLOGY

## 2019-01-11 PROCEDURE — 0502F SUBSEQUENT PRENATAL CARE: CPT | Mod: S$GLB,,, | Performed by: OBSTETRICS & GYNECOLOGY

## 2019-01-11 PROCEDURE — 99999 PR PBB SHADOW E&M-EST. PATIENT-LVL III: CPT | Mod: PBBFAC,,, | Performed by: OBSTETRICS & GYNECOLOGY

## 2019-01-11 PROCEDURE — 90715 TDAP VACCINE 7 YRS/> IM: CPT | Mod: S$GLB,,, | Performed by: OBSTETRICS & GYNECOLOGY

## 2019-01-11 PROCEDURE — 0502F PR SUBSEQUENT PRENATAL CARE: ICD-10-PCS | Mod: S$GLB,,, | Performed by: OBSTETRICS & GYNECOLOGY

## 2019-01-11 PROCEDURE — 90715 TDAP VACCINE GREATER THAN OR EQUAL TO 7YO IM: ICD-10-PCS | Mod: S$GLB,,, | Performed by: OBSTETRICS & GYNECOLOGY

## 2019-01-11 PROCEDURE — 99999 PR PBB SHADOW E&M-EST. PATIENT-LVL II: ICD-10-PCS | Mod: PBBFAC,,,

## 2019-01-11 PROCEDURE — 90472 IMMUNIZATION ADMIN EACH ADD: CPT | Mod: S$GLB,,, | Performed by: OBSTETRICS & GYNECOLOGY

## 2019-01-11 PROCEDURE — 99999 PR PBB SHADOW E&M-EST. PATIENT-LVL III: ICD-10-PCS | Mod: PBBFAC,,, | Performed by: OBSTETRICS & GYNECOLOGY

## 2019-01-11 PROCEDURE — 90686 FLU VACCINE (QUAD) GREATER THAN OR EQUAL TO 3YO PRESERVATIVE FREE IM: ICD-10-PCS | Mod: S$GLB,,, | Performed by: OBSTETRICS & GYNECOLOGY

## 2019-01-11 PROCEDURE — 87086 URINE CULTURE/COLONY COUNT: CPT

## 2019-01-11 PROCEDURE — 90686 IIV4 VACC NO PRSV 0.5 ML IM: CPT | Mod: S$GLB,,, | Performed by: OBSTETRICS & GYNECOLOGY

## 2019-01-11 PROCEDURE — 90472 TDAP VACCINE GREATER THAN OR EQUAL TO 7YO IM: ICD-10-PCS | Mod: S$GLB,,, | Performed by: OBSTETRICS & GYNECOLOGY

## 2019-01-11 PROCEDURE — 90471 FLU VACCINE (QUAD) GREATER THAN OR EQUAL TO 3YO PRESERVATIVE FREE IM: ICD-10-PCS | Mod: S$GLB,,, | Performed by: OBSTETRICS & GYNECOLOGY

## 2019-01-11 PROCEDURE — 99999 PR PBB SHADOW E&M-EST. PATIENT-LVL II: CPT | Mod: PBBFAC,,,

## 2019-01-11 RX ORDER — FERROUS SULFATE 325(65) MG
325 TABLET ORAL 2 TIMES DAILY
Qty: 60 TABLET | Refills: 1 | Status: SHIPPED | OUTPATIENT
Start: 2019-01-11 | End: 2020-01-07

## 2019-01-11 RX ORDER — CEPHALEXIN 500 MG/1
CAPSULE ORAL
Refills: 0 | COMMUNITY
Start: 2019-01-04 | End: 2019-01-11

## 2019-01-11 RX ORDER — DOCUSATE SODIUM 100 MG/1
100 CAPSULE, LIQUID FILLED ORAL 2 TIMES DAILY PRN
Qty: 60 CAPSULE | Refills: 1 | Status: ON HOLD | OUTPATIENT
Start: 2019-01-11 | End: 2019-03-30 | Stop reason: HOSPADM

## 2019-01-11 NOTE — PROGRESS NOTES
PT ARRIVED @ 1004 TO SEE DR JALLOH   , PT'S ORIGINAL APPT WAS @ 1000, PT INJECTION APPT SET AT 1100  , PT WAS ARRIVED FOR HER INJECTION AT 1050, PT WAS ROOMED FOR HER INJECTION @ 1058  Pt here for T- DAP injection, explained what the medication is for, TDAP handout given to pt,  reviewed documentation of medication with pt, injection given via L Deltoid w/o difficulty. Pt stated her understanding of all instructions. Instructed pt to wait in the waiting room for 10-15 mins in case of an immediate adverse reaction  Flu shot given via  r deltoid w/o incident, flu shot handout given to pt, instructed pt to wait in the waiting room for 10-15 mins in case of an immediate adverse reaction

## 2019-01-11 NOTE — PROGRESS NOTES
28w4d here for OB visit.    Pregnancy complicated by abnormal quad screen.    Pt has no major complaints today.  Reports active fetus.  Denies vaginal bleeding, leakage of fluid, or contractions.    Pt seen MFM for abnormal quad screen.  MFM u/s shows normal appearing fetus.  Pt is not interested in [id number] at this time.  F/u with MFM on 1/9 shows normal appearing fetus.    Pt treated for asymptomatic bacteriuria at her last visit.  Repeat urine urine cx today.  Pt denies UTI sxs.  UTI precautions.    1 hr glucola 129.  Mild anemia, continue fergon/colace.    Risks and benefits of the Tdap vaccine discussed and encouraged. Questions answered. Patient is not allergic to eggs or latex. Pt has not been with a fever for the past several days. The vaccine was then given by our nurse, Ms. Patel without any difficulty or problems.    Labor/preE precautions.  Kick counts.  Return 2 wks or sooner prn.  Voiced understanding.

## 2019-01-13 LAB — BACTERIA UR CULT: NO GROWTH

## 2019-01-18 ENCOUNTER — TELEPHONE (OUTPATIENT)
Dept: OBSTETRICS AND GYNECOLOGY | Facility: CLINIC | Age: 24
End: 2019-01-18

## 2019-01-25 ENCOUNTER — ROUTINE PRENATAL (OUTPATIENT)
Dept: OBSTETRICS AND GYNECOLOGY | Facility: CLINIC | Age: 24
End: 2019-01-25
Payer: COMMERCIAL

## 2019-01-25 VITALS
WEIGHT: 192.13 LBS | DIASTOLIC BLOOD PRESSURE: 72 MMHG | BODY MASS INDEX: 34.04 KG/M2 | SYSTOLIC BLOOD PRESSURE: 120 MMHG

## 2019-01-25 DIAGNOSIS — Z3A.30 30 WEEKS GESTATION OF PREGNANCY: Primary | ICD-10-CM

## 2019-01-25 PROCEDURE — 0502F SUBSEQUENT PRENATAL CARE: CPT | Mod: S$GLB,,, | Performed by: OBSTETRICS & GYNECOLOGY

## 2019-01-25 PROCEDURE — 99999 PR PBB SHADOW E&M-EST. PATIENT-LVL II: CPT | Mod: PBBFAC,,, | Performed by: OBSTETRICS & GYNECOLOGY

## 2019-01-25 PROCEDURE — 0502F PR SUBSEQUENT PRENATAL CARE: ICD-10-PCS | Mod: S$GLB,,, | Performed by: OBSTETRICS & GYNECOLOGY

## 2019-01-25 PROCEDURE — 99999 PR PBB SHADOW E&M-EST. PATIENT-LVL II: ICD-10-PCS | Mod: PBBFAC,,, | Performed by: OBSTETRICS & GYNECOLOGY

## 2019-01-26 NOTE — PROGRESS NOTES
30w4d here for OB visit.    Pregnancy complicated by abnormal quad screen.    No major complaints today.  Reports active fetus.  Denies vaginal bleeding, leakage of fluid, or contractions.    Pt treated for asymptomatic bacteriuria at her last visit.  Repeat urine urine cx negative.  Pt denies UTI sxs, UTI precautions.    Continue fergon/colace for anemia.  Labor/preE precautions.  Kick counts.  Return 2 wks or sooner prn.  F/u with MFM 2/5.  Voiced understanding.

## 2019-01-28 DIAGNOSIS — O21.9 NAUSEA AND VOMITING DURING PREGNANCY: ICD-10-CM

## 2019-01-28 RX ORDER — ONDANSETRON 4 MG/1
4 TABLET, ORALLY DISINTEGRATING ORAL EVERY 6 HOURS PRN
Qty: 30 TABLET | Refills: 0 | Status: ON HOLD | OUTPATIENT
Start: 2019-01-28 | End: 2019-03-30 | Stop reason: HOSPADM

## 2019-02-05 ENCOUNTER — HOSPITAL ENCOUNTER (OUTPATIENT)
Dept: PERINATAL CARE | Facility: HOSPITAL | Age: 24
Discharge: HOME | End: 2019-02-05
Attending: OBSTETRICS & GYNECOLOGY
Payer: COMMERCIAL

## 2019-02-05 DIAGNOSIS — O26.90 PREGNANCY, COMPLICATIONS OF: ICD-10-CM

## 2019-02-05 DIAGNOSIS — O26.90 PREGNANCY, COMPLICATIONS OF: Primary | ICD-10-CM

## 2019-02-05 DIAGNOSIS — O28.9 ABNORMAL FINDINGS ON ANTENATAL SCREENING: ICD-10-CM

## 2019-02-05 PROCEDURE — 76816 OB US FOLLOW-UP PER FETUS: CPT | Mod: 26,,, | Performed by: OBSTETRICS & GYNECOLOGY

## 2019-02-05 PROCEDURE — 76816 OB US FOLLOW-UP PER FETUS: CPT

## 2019-02-05 PROCEDURE — 76816 PR  US,PREGNANT UTERUS,F/U,TRANSABD APP: ICD-10-PCS | Mod: 26,,, | Performed by: OBSTETRICS & GYNECOLOGY

## 2019-02-05 PROCEDURE — 76819 FETAL BIOPHYS PROFIL W/O NST: CPT

## 2019-02-08 ENCOUNTER — ROUTINE PRENATAL (OUTPATIENT)
Dept: OBSTETRICS AND GYNECOLOGY | Facility: CLINIC | Age: 24
End: 2019-02-08
Payer: COMMERCIAL

## 2019-02-08 VITALS
BODY MASS INDEX: 34.63 KG/M2 | DIASTOLIC BLOOD PRESSURE: 74 MMHG | WEIGHT: 195.44 LBS | SYSTOLIC BLOOD PRESSURE: 118 MMHG

## 2019-02-08 DIAGNOSIS — Z3A.32 32 WEEKS GESTATION OF PREGNANCY: Primary | ICD-10-CM

## 2019-02-08 DIAGNOSIS — O99.013 ANEMIA AFFECTING PREGNANCY IN THIRD TRIMESTER: ICD-10-CM

## 2019-02-08 PROCEDURE — 0502F SUBSEQUENT PRENATAL CARE: CPT | Mod: CPTII,S$GLB,, | Performed by: OBSTETRICS & GYNECOLOGY

## 2019-02-08 PROCEDURE — 99999 PR PBB SHADOW E&M-EST. PATIENT-LVL II: ICD-10-PCS | Mod: PBBFAC,,, | Performed by: OBSTETRICS & GYNECOLOGY

## 2019-02-08 PROCEDURE — 0502F PR SUBSEQUENT PRENATAL CARE: ICD-10-PCS | Mod: CPTII,S$GLB,, | Performed by: OBSTETRICS & GYNECOLOGY

## 2019-02-08 PROCEDURE — 99999 PR PBB SHADOW E&M-EST. PATIENT-LVL II: CPT | Mod: PBBFAC,,, | Performed by: OBSTETRICS & GYNECOLOGY

## 2019-02-08 NOTE — PROGRESS NOTES
32w4d here for OB visit.    Pregnancy complicated by abnormal quad screen.    Pt has no major complaints today.  Reports active fetus.  Denies vaginal bleeding, leakage of fluid, or contractions.    Pt seen MFM on 2/5.  Overall normal fetal growth.  Normal amniotic fluid volume.  Some of the fetal anatomy remains suboptimally visualized. The fetal anatomy that was seen appeared normal.  Incidental BPP 8/8.  Fetal echo was read as normal.    Continue fergon/colace for anemia.  Labor/preE precautions.  Kick counts.  Return 2 wks.  Voiced understanding.    George Robertson MD

## 2019-02-22 ENCOUNTER — ROUTINE PRENATAL (OUTPATIENT)
Dept: OBSTETRICS AND GYNECOLOGY | Facility: CLINIC | Age: 24
End: 2019-02-22
Payer: COMMERCIAL

## 2019-02-22 VITALS — DIASTOLIC BLOOD PRESSURE: 60 MMHG | BODY MASS INDEX: 33.84 KG/M2 | WEIGHT: 191 LBS | SYSTOLIC BLOOD PRESSURE: 116 MMHG

## 2019-02-22 DIAGNOSIS — Z3A.34 34 WEEKS GESTATION OF PREGNANCY: Primary | ICD-10-CM

## 2019-02-22 PROCEDURE — 99999 PR PBB SHADOW E&M-EST. PATIENT-LVL II: CPT | Mod: PBBFAC,,, | Performed by: OBSTETRICS & GYNECOLOGY

## 2019-02-22 PROCEDURE — 0502F PR SUBSEQUENT PRENATAL CARE: ICD-10-PCS | Mod: CPTII,S$GLB,, | Performed by: OBSTETRICS & GYNECOLOGY

## 2019-02-22 PROCEDURE — 0502F SUBSEQUENT PRENATAL CARE: CPT | Mod: CPTII,S$GLB,, | Performed by: OBSTETRICS & GYNECOLOGY

## 2019-02-22 PROCEDURE — 99999 PR PBB SHADOW E&M-EST. PATIENT-LVL II: ICD-10-PCS | Mod: PBBFAC,,, | Performed by: OBSTETRICS & GYNECOLOGY

## 2019-02-22 NOTE — PROGRESS NOTES
34w4d here for OB visit.    Pregnancy complicated by abnormal quad screen.    Pt has no major complaints today.  Reports active fetus.  Denies vaginal bleeding, leakage of fluid, or contractions.  Continue fergon/colace for anemia.  Labor/preE precautions.  Kick counts.  F/u 2 wks.  Voiced understanding.

## 2019-03-01 ENCOUNTER — ROUTINE PRENATAL (OUTPATIENT)
Dept: OBSTETRICS AND GYNECOLOGY | Facility: CLINIC | Age: 24
End: 2019-03-01
Payer: COMMERCIAL

## 2019-03-01 VITALS
BODY MASS INDEX: 34.39 KG/M2 | WEIGHT: 194.13 LBS | SYSTOLIC BLOOD PRESSURE: 118 MMHG | DIASTOLIC BLOOD PRESSURE: 70 MMHG

## 2019-03-01 DIAGNOSIS — Z3A.35 35 WEEKS GESTATION OF PREGNANCY: Primary | ICD-10-CM

## 2019-03-01 PROCEDURE — 0502F SUBSEQUENT PRENATAL CARE: CPT | Mod: CPTII,S$GLB,, | Performed by: OBSTETRICS & GYNECOLOGY

## 2019-03-01 PROCEDURE — 99999 PR PBB SHADOW E&M-EST. PATIENT-LVL II: CPT | Mod: PBBFAC,,, | Performed by: OBSTETRICS & GYNECOLOGY

## 2019-03-01 PROCEDURE — 99999 PR PBB SHADOW E&M-EST. PATIENT-LVL II: ICD-10-PCS | Mod: PBBFAC,,, | Performed by: OBSTETRICS & GYNECOLOGY

## 2019-03-01 PROCEDURE — 0502F PR SUBSEQUENT PRENATAL CARE: ICD-10-PCS | Mod: CPTII,S$GLB,, | Performed by: OBSTETRICS & GYNECOLOGY

## 2019-03-01 NOTE — PROGRESS NOTES
35w4d here for OB visit.  Pregnancy complicated by abnormal quad screen.  No major complaints today.  Reports active fetus.  Denies vaginal bleeding, leakage of fluid, or contractions.  Continue fergon/colace for anemia.  Labor/preE precautions.  Kick counts.  F/u 1 wk.  Voiced understanding.  Significant other present for visit.    George Robertson MD

## 2019-03-08 ENCOUNTER — ROUTINE PRENATAL (OUTPATIENT)
Dept: OBSTETRICS AND GYNECOLOGY | Facility: CLINIC | Age: 24
End: 2019-03-08
Payer: COMMERCIAL

## 2019-03-08 VITALS
WEIGHT: 195.75 LBS | DIASTOLIC BLOOD PRESSURE: 76 MMHG | SYSTOLIC BLOOD PRESSURE: 118 MMHG | BODY MASS INDEX: 34.69 KG/M2

## 2019-03-08 DIAGNOSIS — Z3A.36 36 WEEKS GESTATION OF PREGNANCY: Primary | ICD-10-CM

## 2019-03-08 PROCEDURE — 99999 PR PBB SHADOW E&M-EST. PATIENT-LVL II: CPT | Mod: PBBFAC,,, | Performed by: OBSTETRICS & GYNECOLOGY

## 2019-03-08 PROCEDURE — 0502F SUBSEQUENT PRENATAL CARE: CPT | Mod: CPTII,S$GLB,, | Performed by: OBSTETRICS & GYNECOLOGY

## 2019-03-08 PROCEDURE — 99999 PR PBB SHADOW E&M-EST. PATIENT-LVL II: ICD-10-PCS | Mod: PBBFAC,,, | Performed by: OBSTETRICS & GYNECOLOGY

## 2019-03-08 PROCEDURE — 0502F PR SUBSEQUENT PRENATAL CARE: ICD-10-PCS | Mod: CPTII,S$GLB,, | Performed by: OBSTETRICS & GYNECOLOGY

## 2019-03-08 NOTE — PROGRESS NOTES
36w4d here for OB visit.    Pregnancy complicated by abnormal quad screen.    Pt has no major complaints today.  Reports active fetus.  Denies vaginal bleeding, leakage of fluid, or contractions.  Pt declined pelvic exam/GBS today due to family present for visit, will do next visit.  Continue fergon/colace for anemia.  Labor/preE precautions.  Kick counts.  F/u 1 wk.  Voiced understanding.  Significant other present for visit.    George Robertson MD

## 2019-03-15 ENCOUNTER — ROUTINE PRENATAL (OUTPATIENT)
Dept: OBSTETRICS AND GYNECOLOGY | Facility: CLINIC | Age: 24
End: 2019-03-15
Payer: COMMERCIAL

## 2019-03-15 ENCOUNTER — LAB VISIT (OUTPATIENT)
Dept: LAB | Facility: HOSPITAL | Age: 24
End: 2019-03-15
Attending: FAMILY MEDICINE
Payer: COMMERCIAL

## 2019-03-15 VITALS — DIASTOLIC BLOOD PRESSURE: 62 MMHG | WEIGHT: 197.56 LBS | BODY MASS INDEX: 35 KG/M2 | SYSTOLIC BLOOD PRESSURE: 118 MMHG

## 2019-03-15 DIAGNOSIS — Z3A.37 37 WEEKS GESTATION OF PREGNANCY: Primary | ICD-10-CM

## 2019-03-15 DIAGNOSIS — Z3A.37 37 WEEKS GESTATION OF PREGNANCY: ICD-10-CM

## 2019-03-15 PROCEDURE — 0502F SUBSEQUENT PRENATAL CARE: CPT | Mod: CPTII,S$GLB,, | Performed by: OBSTETRICS & GYNECOLOGY

## 2019-03-15 PROCEDURE — 99999 PR PBB SHADOW E&M-EST. PATIENT-LVL II: ICD-10-PCS | Mod: PBBFAC,,, | Performed by: OBSTETRICS & GYNECOLOGY

## 2019-03-15 PROCEDURE — 0502F PR SUBSEQUENT PRENATAL CARE: ICD-10-PCS | Mod: CPTII,S$GLB,, | Performed by: OBSTETRICS & GYNECOLOGY

## 2019-03-15 PROCEDURE — 87081 CULTURE SCREEN ONLY: CPT

## 2019-03-15 PROCEDURE — 36415 COLL VENOUS BLD VENIPUNCTURE: CPT

## 2019-03-15 PROCEDURE — 86703 HIV-1/HIV-2 1 RESULT ANTBDY: CPT

## 2019-03-15 PROCEDURE — 99999 PR PBB SHADOW E&M-EST. PATIENT-LVL II: CPT | Mod: PBBFAC,,, | Performed by: OBSTETRICS & GYNECOLOGY

## 2019-03-15 NOTE — PROGRESS NOTES
37w4d here for OB visit.    Pregnancy complicated by abnormal quad screen.    Pt has no major complaints today.  Reports active fetus.  Denies vaginal bleeding, leakage of fluid, or contractions.  Order HIV, GBS.  Delivery consents signed.  Continue fergon/colace for anemia.  Labor/preE precautions.  Kick counts.  F/u 1 wk.  Voiced understanding.  Significant other present for visit.    George Robertson MD

## 2019-03-17 LAB — BACTERIA SPEC AEROBE CULT: NORMAL

## 2019-03-18 LAB — HIV 1+2 AB+HIV1 P24 AG SERPL QL IA: NEGATIVE

## 2019-03-21 ENCOUNTER — TELEPHONE (OUTPATIENT)
Dept: OBSTETRICS AND GYNECOLOGY | Facility: CLINIC | Age: 24
End: 2019-03-21

## 2019-03-22 ENCOUNTER — TELEPHONE (OUTPATIENT)
Dept: OBSTETRICS AND GYNECOLOGY | Facility: CLINIC | Age: 24
End: 2019-03-22

## 2019-03-22 ENCOUNTER — ROUTINE PRENATAL (OUTPATIENT)
Dept: OBSTETRICS AND GYNECOLOGY | Facility: CLINIC | Age: 24
End: 2019-03-22
Payer: COMMERCIAL

## 2019-03-22 VITALS
SYSTOLIC BLOOD PRESSURE: 108 MMHG | WEIGHT: 197.75 LBS | DIASTOLIC BLOOD PRESSURE: 62 MMHG | BODY MASS INDEX: 35.04 KG/M2

## 2019-03-22 DIAGNOSIS — Z3A.38 38 WEEKS GESTATION OF PREGNANCY: Primary | ICD-10-CM

## 2019-03-22 PROCEDURE — 99999 PR PBB SHADOW E&M-EST. PATIENT-LVL II: ICD-10-PCS | Mod: PBBFAC,,, | Performed by: OBSTETRICS & GYNECOLOGY

## 2019-03-22 PROCEDURE — 0502F SUBSEQUENT PRENATAL CARE: CPT | Mod: CPTII,S$GLB,, | Performed by: OBSTETRICS & GYNECOLOGY

## 2019-03-22 PROCEDURE — 99999 PR PBB SHADOW E&M-EST. PATIENT-LVL II: CPT | Mod: PBBFAC,,, | Performed by: OBSTETRICS & GYNECOLOGY

## 2019-03-22 PROCEDURE — 0502F PR SUBSEQUENT PRENATAL CARE: ICD-10-PCS | Mod: CPTII,S$GLB,, | Performed by: OBSTETRICS & GYNECOLOGY

## 2019-03-22 RX ORDER — SODIUM CHLORIDE 9 MG/ML
INJECTION, SOLUTION INTRAVENOUS
Status: CANCELLED | OUTPATIENT
Start: 2019-03-22

## 2019-03-22 RX ORDER — MISOPROSTOL 100 UG/1
600 TABLET ORAL
Status: CANCELLED | OUTPATIENT
Start: 2019-03-22

## 2019-03-22 RX ORDER — SODIUM CHLORIDE, SODIUM LACTATE, POTASSIUM CHLORIDE, CALCIUM CHLORIDE 600; 310; 30; 20 MG/100ML; MG/100ML; MG/100ML; MG/100ML
INJECTION, SOLUTION INTRAVENOUS CONTINUOUS
Status: CANCELLED | OUTPATIENT
Start: 2019-03-22

## 2019-03-22 RX ORDER — BUTORPHANOL TARTRATE 1 MG/ML
1 INJECTION INTRAMUSCULAR; INTRAVENOUS
Status: CANCELLED | OUTPATIENT
Start: 2019-03-22

## 2019-03-22 RX ORDER — ONDANSETRON 4 MG/1
8 TABLET, ORALLY DISINTEGRATING ORAL EVERY 8 HOURS PRN
Status: CANCELLED | OUTPATIENT
Start: 2019-03-22

## 2019-03-22 NOTE — TELEPHONE ENCOUNTER
----- Message from Liliana Malone sent at 3/22/2019  2:07 PM CDT -----  Contact: Self   Patient says she just left the office from discussing and induction. She says she has changed her mind and would like to be induced. Please call at [phone].

## 2019-03-22 NOTE — PROGRESS NOTES
38w4d here for OB visit.  Pregnancy complicated by abnormal quad screen.  Pt has no major complaints today.  Reports active fetus.  Denies vaginal bleeding, leakage of fluid, or contractions.  Continue fergon/colace for anemia.  Labor/preE precautions.  Kick counts.  F/u 1 wk.  Voiced understanding.  Significant other present for visit.    George Robertson MD

## 2019-03-22 NOTE — TELEPHONE ENCOUNTER
----- Message from Liliana Malone sent at 3/22/2019  2:07 PM CDT -----  Contact: Self   Patient says she just left the office from discussing and induction. She says she has changed her mind and would like to be induced. Please call at [phone].

## 2019-03-22 NOTE — PROGRESS NOTES
Pt called clinic requesting IOL.  Pt is requesting induction of labor 3/27/19.  Benefits of waiting for spontaneous labor discussed.  Risks, benefits, and alternatives to IOL reviewed including but not limited to failed induction resulting .  Pt is resolute in her decision to proceed with IOL.

## 2019-03-26 ENCOUNTER — TELEPHONE (OUTPATIENT)
Dept: OBSTETRICS AND GYNECOLOGY | Facility: CLINIC | Age: 24
End: 2019-03-26

## 2019-03-26 NOTE — TELEPHONE ENCOUNTER
LM for pt to call office, we never seen her       ----- Message from Yelena Stanley sent at 3/26/2019  2:49 PM CDT -----  Contact: Self  Patient called with questions and concerns about upcoming appointment. Please call at [phone]

## 2019-03-27 ENCOUNTER — TELEPHONE (OUTPATIENT)
Dept: OBSTETRICS AND GYNECOLOGY | Facility: CLINIC | Age: 24
End: 2019-03-27

## 2019-03-27 ENCOUNTER — HOSPITAL ENCOUNTER (INPATIENT)
Facility: HOSPITAL | Age: 24
LOS: 3 days | Discharge: HOME | End: 2019-03-30
Attending: OBSTETRICS & GYNECOLOGY | Admitting: OBSTETRICS & GYNECOLOGY
Payer: COMMERCIAL

## 2019-03-27 DIAGNOSIS — Z3A.38 38 WEEKS GESTATION OF PREGNANCY: ICD-10-CM

## 2019-03-27 LAB
ABO + RH BLD: NORMAL
BASOPHILS # BLD AUTO: 0.02 K/UL (ref 0–0.2)
BASOPHILS NFR BLD: 0.2 % (ref 0–1.9)
BLD GP AB SCN CELLS X3 SERPL QL: NORMAL
DIFFERENTIAL METHOD: ABNORMAL
EOSINOPHIL # BLD AUTO: 0.1 K/UL (ref 0–0.5)
EOSINOPHIL NFR BLD: 0.6 % (ref 0–8)
ERYTHROCYTE [DISTWIDTH] IN BLOOD BY AUTOMATED COUNT: 13.8 % (ref 11.5–14.5)
HCT VFR BLD AUTO: 29.2 % (ref 37–48.5)
HGB BLD-MCNC: 9.2 G/DL (ref 12–16)
LYMPHOCYTES # BLD AUTO: 2.7 K/UL (ref 1–4.8)
LYMPHOCYTES NFR BLD: 30.6 % (ref 18–48)
MCH RBC QN AUTO: 25.3 PG (ref 27–31)
MCHC RBC AUTO-ENTMCNC: 31.5 G/DL (ref 32–36)
MCV RBC AUTO: 80 FL (ref 82–98)
MONOCYTES # BLD AUTO: 0.9 K/UL (ref 0.3–1)
MONOCYTES NFR BLD: 10.4 % (ref 4–15)
NEUTROPHILS # BLD AUTO: 5.1 K/UL (ref 1.8–7.7)
NEUTROPHILS NFR BLD: 58.2 % (ref 38–73)
PLATELET # BLD AUTO: 338 K/UL (ref 150–350)
PMV BLD AUTO: 9.3 FL (ref 9.2–12.9)
RBC # BLD AUTO: 3.64 M/UL (ref 4–5.4)
WBC # BLD AUTO: 8.68 K/UL (ref 3.9–12.7)

## 2019-03-27 PROCEDURE — 86850 RBC ANTIBODY SCREEN: CPT

## 2019-03-27 PROCEDURE — 11000001 HC ACUTE MED/SURG PRIVATE ROOM

## 2019-03-27 PROCEDURE — 36415 COLL VENOUS BLD VENIPUNCTURE: CPT

## 2019-03-27 PROCEDURE — 86592 SYPHILIS TEST NON-TREP QUAL: CPT

## 2019-03-27 PROCEDURE — 85025 COMPLETE CBC W/AUTO DIFF WBC: CPT

## 2019-03-27 PROCEDURE — 72100002 HC LABOR CARE, 1ST 8 HOURS

## 2019-03-27 PROCEDURE — 25000003 PHARM REV CODE 250: Performed by: OBSTETRICS & GYNECOLOGY

## 2019-03-27 RX ORDER — OXYTOCIN/RINGER'S LACTATE 20/1000 ML
333 PLASTIC BAG, INJECTION (ML) INTRAVENOUS CONTINUOUS
Status: ACTIVE | OUTPATIENT
Start: 2019-03-27 | End: 2019-03-27

## 2019-03-27 RX ORDER — SODIUM CHLORIDE, SODIUM LACTATE, POTASSIUM CHLORIDE, CALCIUM CHLORIDE 600; 310; 30; 20 MG/100ML; MG/100ML; MG/100ML; MG/100ML
INJECTION, SOLUTION INTRAVENOUS CONTINUOUS
Status: DISCONTINUED | OUTPATIENT
Start: 2019-03-27 | End: 2019-03-28

## 2019-03-27 RX ORDER — OXYTOCIN/RINGER'S LACTATE 20/1000 ML
2 PLASTIC BAG, INJECTION (ML) INTRAVENOUS CONTINUOUS
Status: DISCONTINUED | OUTPATIENT
Start: 2019-03-28 | End: 2019-03-28

## 2019-03-27 RX ORDER — OXYTOCIN/RINGER'S LACTATE 20/1000 ML
41.7 PLASTIC BAG, INJECTION (ML) INTRAVENOUS CONTINUOUS
Status: ACTIVE | OUTPATIENT
Start: 2019-03-27 | End: 2019-03-27

## 2019-03-27 RX ORDER — BUTORPHANOL TARTRATE 2 MG/ML
1 INJECTION INTRAMUSCULAR; INTRAVENOUS
Status: DISCONTINUED | OUTPATIENT
Start: 2019-03-27 | End: 2019-03-28

## 2019-03-27 RX ORDER — MISOPROSTOL 200 UG/1
600 TABLET ORAL
Status: DISCONTINUED | OUTPATIENT
Start: 2019-03-27 | End: 2019-03-28

## 2019-03-27 RX ORDER — ONDANSETRON 8 MG/1
8 TABLET, ORALLY DISINTEGRATING ORAL EVERY 8 HOURS PRN
Status: DISCONTINUED | OUTPATIENT
Start: 2019-03-27 | End: 2019-03-28

## 2019-03-27 RX ORDER — SODIUM CHLORIDE 9 MG/ML
INJECTION, SOLUTION INTRAVENOUS
Status: DISCONTINUED | OUTPATIENT
Start: 2019-03-27 | End: 2019-03-28

## 2019-03-27 RX ADMIN — SODIUM CHLORIDE, SODIUM LACTATE, POTASSIUM CHLORIDE, AND CALCIUM CHLORIDE 1000 ML: .6; .31; .03; .02 INJECTION, SOLUTION INTRAVENOUS at 05:03

## 2019-03-27 RX ADMIN — DINOPROSTONE 10 MG: 10 INSERT VAGINAL at 05:03

## 2019-03-27 NOTE — TELEPHONE ENCOUNTER
----- Message from Doris Gill sent at 3/27/2019  9:46 AM CDT -----  Contact: pt  Name of Who is Calling: pt      What is the request in detail: pt is calling to know what she needs to do to prepare for inducing today. Call pt      Can the clinic reply by [facility]: no      [facility] [phone]      Patient is aware that she will need to go to pt reg at 3:00pm and the nurses will take care of her from there.

## 2019-03-27 NOTE — NURSING
Arrived to unit with  and daughter.  Instructed to take a hibiclens shower then call RN when done.

## 2019-03-28 ENCOUNTER — ANESTHESIA (OUTPATIENT)
Dept: OBSTETRICS AND GYNECOLOGY | Facility: HOSPITAL | Age: 24
End: 2019-03-28
Payer: COMMERCIAL

## 2019-03-28 ENCOUNTER — ANESTHESIA EVENT (OUTPATIENT)
Dept: OBSTETRICS AND GYNECOLOGY | Facility: HOSPITAL | Age: 24
End: 2019-03-28
Payer: COMMERCIAL

## 2019-03-28 PROBLEM — Z3A.35 35 WEEKS GESTATION OF PREGNANCY: Status: RESOLVED | Noted: 2018-11-28 | Resolved: 2019-03-28

## 2019-03-28 PROBLEM — Z3A.39 39 WEEKS GESTATION OF PREGNANCY: Status: ACTIVE | Noted: 2019-03-27

## 2019-03-28 PROCEDURE — 59400 PRA FULL ROUT OBSTE CARE,VAGINAL DELIV: ICD-10-PCS | Mod: AA,,, | Performed by: ANESTHESIOLOGY

## 2019-03-28 PROCEDURE — 72200005 HC VAGINAL DELIVERY LEVEL II

## 2019-03-28 PROCEDURE — 63600175 PHARM REV CODE 636 W HCPCS: Performed by: NURSE ANESTHETIST, CERTIFIED REGISTERED

## 2019-03-28 PROCEDURE — 11000001 HC ACUTE MED/SURG PRIVATE ROOM

## 2019-03-28 PROCEDURE — 27800517 HC TRAY,EPIDURAL-CONTINUOUS: Performed by: ANESTHESIOLOGY

## 2019-03-28 PROCEDURE — 72100003 HC LABOR CARE, EA. ADDL. 8 HRS

## 2019-03-28 PROCEDURE — 25000003 PHARM REV CODE 250: Performed by: ANESTHESIOLOGY

## 2019-03-28 PROCEDURE — 25000003 PHARM REV CODE 250: Performed by: NURSE ANESTHETIST, CERTIFIED REGISTERED

## 2019-03-28 PROCEDURE — 51702 INSERT TEMP BLADDER CATH: CPT

## 2019-03-28 PROCEDURE — S0020 INJECTION, BUPIVICAINE HYDRO: HCPCS | Performed by: ANESTHESIOLOGY

## 2019-03-28 PROCEDURE — 62326 NJX INTERLAMINAR LMBR/SAC: CPT | Performed by: ANESTHESIOLOGY

## 2019-03-28 PROCEDURE — 25000003 PHARM REV CODE 250: Performed by: OBSTETRICS & GYNECOLOGY

## 2019-03-28 PROCEDURE — 59400 PR FULL ROUT OBSTE CARE,VAGINAL DELIV: ICD-10-PCS | Mod: ,,, | Performed by: OBSTETRICS & GYNECOLOGY

## 2019-03-28 PROCEDURE — 59400 OBSTETRICAL CARE: CPT | Mod: ,,, | Performed by: OBSTETRICS & GYNECOLOGY

## 2019-03-28 PROCEDURE — 27200710 HC EPIDURAL INFUSION PUMP SET: Performed by: ANESTHESIOLOGY

## 2019-03-28 PROCEDURE — 59400 OBSTETRICAL CARE: CPT | Mod: AA,,, | Performed by: ANESTHESIOLOGY

## 2019-03-28 PROCEDURE — 63600175 PHARM REV CODE 636 W HCPCS: Performed by: OBSTETRICS & GYNECOLOGY

## 2019-03-28 RX ORDER — OXYCODONE AND ACETAMINOPHEN 10; 325 MG/1; MG/1
1 TABLET ORAL EVERY 4 HOURS PRN
Status: DISCONTINUED | OUTPATIENT
Start: 2019-03-28 | End: 2019-03-30 | Stop reason: HOSPADM

## 2019-03-28 RX ORDER — HYDROCORTISONE 25 MG/G
CREAM TOPICAL 3 TIMES DAILY PRN
Status: DISCONTINUED | OUTPATIENT
Start: 2019-03-28 | End: 2019-03-30 | Stop reason: HOSPADM

## 2019-03-28 RX ORDER — DOCUSATE SODIUM 100 MG/1
200 CAPSULE, LIQUID FILLED ORAL 2 TIMES DAILY PRN
Status: DISCONTINUED | OUTPATIENT
Start: 2019-03-28 | End: 2019-03-30 | Stop reason: HOSPADM

## 2019-03-28 RX ORDER — OXYTOCIN/RINGER'S LACTATE 20/1000 ML
41.65 PLASTIC BAG, INJECTION (ML) INTRAVENOUS CONTINUOUS
Status: DISPENSED | OUTPATIENT
Start: 2019-03-28 | End: 2019-03-29

## 2019-03-28 RX ORDER — PHENYLEPHRINE HYDROCHLORIDE 10 MG/ML
INJECTION INTRAVENOUS
Status: DISCONTINUED | OUTPATIENT
Start: 2019-03-28 | End: 2019-03-28

## 2019-03-28 RX ORDER — ONDANSETRON 8 MG/1
8 TABLET, ORALLY DISINTEGRATING ORAL EVERY 8 HOURS PRN
Status: DISCONTINUED | OUTPATIENT
Start: 2019-03-28 | End: 2019-03-30 | Stop reason: HOSPADM

## 2019-03-28 RX ORDER — OXYCODONE AND ACETAMINOPHEN 5; 325 MG/1; MG/1
1 TABLET ORAL EVERY 4 HOURS PRN
Status: DISCONTINUED | OUTPATIENT
Start: 2019-03-28 | End: 2019-03-30 | Stop reason: HOSPADM

## 2019-03-28 RX ORDER — FENTANYL/BUPIVACAINE/NS/PF 2MCG/ML-.1
PLASTIC BAG, INJECTION (ML) INJECTION CONTINUOUS PRN
Status: DISCONTINUED | OUTPATIENT
Start: 2019-03-28 | End: 2019-03-28

## 2019-03-28 RX ORDER — SIMETHICONE 80 MG
1 TABLET,CHEWABLE ORAL EVERY 6 HOURS PRN
Status: DISCONTINUED | OUTPATIENT
Start: 2019-03-28 | End: 2019-03-30 | Stop reason: HOSPADM

## 2019-03-28 RX ORDER — IBUPROFEN 600 MG/1
600 TABLET ORAL EVERY 6 HOURS PRN
Status: DISCONTINUED | OUTPATIENT
Start: 2019-03-28 | End: 2019-03-30 | Stop reason: HOSPADM

## 2019-03-28 RX ORDER — BUPIVACAINE HYDROCHLORIDE 2.5 MG/ML
INJECTION, SOLUTION INFILTRATION; PERINEURAL
Status: COMPLETED | OUTPATIENT
Start: 2019-03-28 | End: 2019-03-28

## 2019-03-28 RX ORDER — LIDOCAINE HCL/EPINEPHRINE/PF 2%-1:200K
VIAL (ML) INJECTION
Status: DISCONTINUED | OUTPATIENT
Start: 2019-03-28 | End: 2019-03-28

## 2019-03-28 RX ADMIN — OXYCODONE HYDROCHLORIDE AND ACETAMINOPHEN 1 TABLET: 10; 325 TABLET ORAL at 09:03

## 2019-03-28 RX ADMIN — SODIUM CHLORIDE, SODIUM LACTATE, POTASSIUM CHLORIDE, AND CALCIUM CHLORIDE: .6; .31; .03; .02 INJECTION, SOLUTION INTRAVENOUS at 04:03

## 2019-03-28 RX ADMIN — SODIUM CHLORIDE, SODIUM LACTATE, POTASSIUM CHLORIDE, AND CALCIUM CHLORIDE: .6; .31; .03; .02 INJECTION, SOLUTION INTRAVENOUS at 05:03

## 2019-03-28 RX ADMIN — BUTORPHANOL TARTRATE 1 MG: 2 INJECTION, SOLUTION INTRAMUSCULAR; INTRAVENOUS at 09:03

## 2019-03-28 RX ADMIN — SODIUM CHLORIDE, SODIUM LACTATE, POTASSIUM CHLORIDE, AND CALCIUM CHLORIDE: .6; .31; .03; .02 INJECTION, SOLUTION INTRAVENOUS at 11:03

## 2019-03-28 RX ADMIN — Medication 10 ML/HR: at 12:03

## 2019-03-28 RX ADMIN — Medication 2 MILLI-UNITS/MIN: at 05:03

## 2019-03-28 RX ADMIN — IBUPROFEN 600 MG: 600 TABLET ORAL at 07:03

## 2019-03-28 RX ADMIN — PHENYLEPHRINE HYDROCHLORIDE 200 MCG: 10 INJECTION INTRAVENOUS at 01:03

## 2019-03-28 RX ADMIN — BUPIVACAINE HYDROCHLORIDE 8 ML: 2.5 INJECTION, SOLUTION INFILTRATION; PERINEURAL at 12:03

## 2019-03-28 RX ADMIN — Medication 41.65 MILLI-UNITS/MIN: at 06:03

## 2019-03-28 RX ADMIN — LIDOCAINE HYDROCHLORIDE,EPINEPHRINE BITARTRATE 2 ML: 20; .005 INJECTION, SOLUTION EPIDURAL; INFILTRATION; INTRACAUDAL; PERINEURAL at 03:03

## 2019-03-28 RX ADMIN — PROMETHAZINE HYDROCHLORIDE 12.5 MG: 25 INJECTION INTRAMUSCULAR; INTRAVENOUS at 09:03

## 2019-03-28 RX ADMIN — LIDOCAINE HYDROCHLORIDE,EPINEPHRINE BITARTRATE 3 ML: 20; .005 INJECTION, SOLUTION EPIDURAL; INFILTRATION; INTRACAUDAL; PERINEURAL at 03:03

## 2019-03-28 RX ADMIN — SODIUM CHLORIDE, SODIUM LACTATE, POTASSIUM CHLORIDE, AND CALCIUM CHLORIDE: .6; .31; .03; .02 INJECTION, SOLUTION INTRAVENOUS at 01:03

## 2019-03-28 NOTE — ANESTHESIA PREPROCEDURE EVALUATION
03/28/2019  Talia Marin is a 23 y.o., female.    Anesthesia Evaluation    I have reviewed the Patient Summary Reports.    I have reviewed the Nursing Notes.      Review of Systems  Social:  Non-Smoker, No Alcohol Use    Cardiovascular:  Cardiovascular Normal Exercise tolerance: good  Denies MI.  Denies CAD.    Denies Dysrhythmias.   Denies Angina.    Pulmonary:  Pulmonary Normal    Hepatic/GI:  Hepatic/GI Normal    OB/GYN/PEDS:  IUP   Neurological:  Neurology Normal    Endocrine:  Endocrine Normal        Physical Exam  General:  Well nourished    Airway/Jaw/Neck:  Airway Findings: Mouth Opening: Normal Tongue: Normal  Jaw/Neck Findings:  Neck ROM: Normal ROM      Dental:  Dental Findings: In tact   Chest/Lungs:  Chest/Lungs Findings: Normal Respiratory Rate         Mental Status:  Mental Status Findings:  Cooperative       Wt Readings from Last 3 Encounters:   03/27/19 88.9 kg (196 lb)   03/22/19 89.7 kg (197 lb 12 oz)   03/15/19 89.6 kg (197 lb 8.5 oz)     Temp Readings from Last 3 Encounters:   03/28/19 36.9 °C (98.4 °F) (Oral)   11/13/17 36.6 °C (97.9 °F)   11/13/17 36.9 °C (98.5 °F) (Oral)     BP Readings from Last 3 Encounters:   03/28/19 118/67   03/22/19 108/62   03/15/19 118/62     Pulse Readings from Last 3 Encounters:   03/28/19 77   12/14/18 80   11/28/18 82     Lab Results   Component Value Date    WBC 8.68 03/27/2019    HGB 9.2 (L) 03/27/2019    HCT 29.2 (L) 03/27/2019    MCV 80 (L) 03/27/2019     03/27/2019         Anesthesia Plan  Type of Anesthesia, risks & benefits discussed:  Anesthesia Type:  epidural  Patient's Preference:   Intra-op Monitoring Plan: standard ASA monitors  Intra-op Monitoring Plan Comments:   Post Op Pain Control Plan: epidural analgesia, per primary service following discharge from PACU and multimodal analgesia  Post Op Pain Control Plan Comments:   Induction:     Beta Blocker:  Patient is not currently on a Beta-Blocker (No further documentation required).       Informed Consent: Patient understands risks and agrees with Anesthesia plan.  Questions answered. Anesthesia consent signed with patient.  ASA Score: 2     Day of Surgery Review of History & Physical: I have interviewed and examined the patient. I have reviewed the patient's H&P dated:    H&P update referred to the surgeon.         Ready For Surgery From Anesthesia Perspective.

## 2019-03-28 NOTE — NURSING
Report received from KEVIN Serrano RN; care assumed. AAOx3, assessment completed. Denies pain, rates pain 0/10 using pain scale. Left hand #20g IV patent, infusing LR @ 125 cc/h; pitocin @ 4 mu/min without difficulty, no redness or swelling present. POC reviewed with pt, pt verb understanding. Desires epidural during labor, will notify labor staff when ready. Call bell within reach, will monitor. NAD.

## 2019-03-28 NOTE — ANESTHESIA PROCEDURE NOTES
Epidural    Patient location during procedure: OB   Reason for block: primary anesthetic   Diagnosis: Active Labor   Start time: 3/28/2019 12:07 PM  Timeout: 3/28/2019 12:03 PM  End time: 3/28/2019 12:10 PM  Surgery related to: Vaginal Delivery  Staffing  Anesthesiologist: Rosalind Castro MD  Preanesthetic Checklist  Completed: patient identified, site marked, surgical consent, pre-op evaluation, timeout performed, IV checked, risks and benefits discussed, monitors and equipment checked, anesthesia consent given, hand hygiene performed and patient being monitored  Preparation  Patient position: sitting  Prep: ChloraPrep  Patient monitoring: Pulse Ox and Blood Pressure  Epidural  Skin Anesthetic: lidocaine 1%  Skin Wheal: 3 mL  Administration type: continuous  Approach: midline  Interspace: L4-5    Injection technique: NI saline  Needle and Epidural Catheter  Needle type: Tuohy   Needle gauge: 17  Needle length: 3.5 inches  Needle insertion depth: 6 cm  Catheter type: springwound and multi-orifice  Catheter size: 19 G  Catheter at skin depth: 10 cm  Test dose: 3 mL of lidocaine 1.5% with Epi 1-to-200,000  Additional Documentation: incremental injection, negative aspiration for heme and CSF, no paresthesia on injection, no signs/symptoms of IV or SA injection, no significant pain on injection and no significant complaints from patient  Needle localization: anatomical landmarks  Medications:  Volume per aspiration: 4 mL  Time between aspirations: 2 minutes  Assessment  Ease of block: easy  Patient's tolerance of the procedure: comfortable throughout block and no complaintsNo inadvertent dural puncture with Tuohy.  Dural puncture not performed with spinal needle

## 2019-03-28 NOTE — L&D DELIVERY NOTE
[facility]   Delivery Summary  Obstetrics & Gynecology       Date of Delivery:  3/28/2019        Preoperative Diagnosis:    Izquierdo gestation at 39w3d in active labor    Postoperative Diagnosis:    Izquierdo gestation at 39w3d s/p spontaneous vaginal delivery  Live infant  Procedure Performed:  Vaginal delivery    Indication (HPI):     See History & Physical for complete details.  In brief, this is a 23 y.o.  at 39w3d gestation admitted for induction of labor secondary to maternal request with favorable cervix.  The induction was initiated with Cervidil x 1 dose followed by pitocin.  Pt progressed well through the active phase of labor and delivered a viable infant.  Maternal and fetal status was overall reassuring throughout the labor course.  AROM 3/28/2019 ~9:00 AM with moderate, clear fluid, no odor and had no intrapartum fever.  Pt was informed of the risks, benefits, alternatives and possible complications to a vaginal delivery.  Informed consent was obtained for delivery and blood transfusion.      Anesthesia:  Epidural     EBL:  200 mL with no replacements    Specimens:  Cord blood      Findings, Infant & Apgars:      Live male infant, APGAR 1 min: 9, 5 min: 9, transfer to well baby  Weight pending at time of note   AROM 3/28/2019 ~9AM with moderate, clear fluid, no odor, no intrapartum fever   Second degree midline laceration, repaired    Complications:  None    Delivery Summary:      Vaginal delivery of viable infant.  Infant delivered after 3 minutes of pushing.  No nuchal cord reduced.  No shoulder dystocia.  2nd degree laceration repaired with 2-0 chromic in usual fashion.  Episiotomy was not performed.  The infant was vigorous with a strong cry.  Cord blood was collected.   The placenta delivered spontaneously intact with three vessel cord.    Uterine massage and 20 units of Pitocin IV were given until the fundus was firm.    Hemostasis was noted.    No sponges were  left in the vagina.   Sponge, lap, needle, and instrument counts were correct time two.   Mother and baby were bonding well at the end of the delivery both in stable condition.   Findings and expectations were discussed with the patient.   All of pt questions were answered to her satisfaction, pt voiced understanding.      George Robertson MD

## 2019-03-28 NOTE — H&P
[facility]    Obstetrics & Gynecology  History & Physical      Patient Name:  Talia Marin  MRN:  [id number]  Admission Date:  3/27/2019  Hospital Length of Stay:  0  Attending Physician:  George Robertson MD  Primary Care Provider:      Date:  2019    Chief Complaint:  Induction of labor    History of Present Illness:      Talia Marin is a 23 y.o.  at 39w2d admitted for induction of labor secondary to maternal request with favorable cervix.  Pt has no major complaints.  She reports active fetal movements and occasional mild contractions, and denies any vaginal bleeding or leakage of fluid.   Pt antepartum course has been overall uneventful.    Past Medical History:      None      Past Surgical History:     H/o D&C for missed/spontaneous  with blood transfusion    Medications:      Prenatal vitamins   Fergon  Colace    Allergies:      NKDA      Obstetric History:          T2      L2     SAB1   TAB0   Ectopic0   Multiple0   Live Births2       # Outcome Date GA Lbr Brandan/2nd Weight Sex Delivery Anes PTL Lv   4 Current            3 Term 06/08/15    F Vag-Spont  N ALEE   2 Term 12    M Vag-Spont  N ALEE   1 SAB               Obstetric Comments   G2- IUFD of twins at 17 wks     Gynecologic History:      Denies recent/active STI  Denies history abnormal Pap, last pap 2017 at [facility]     Social History:      Denies tobacco, alcohol or illicit drug use  Current partner is father of baby  Denies domestic abuse     Family History:       Denies congenital anomalies, inherited syndromes, fetal aneuploidy    Review of Systems   Constitutional: Negative.    HENT: Negative.    Eyes: Negative.    Respiratory: Negative.    Cardiovascular: Negative.    Gastrointestinal: Negative.    Endocrine: Negative.    Genitourinary: Negative.    Musculoskeletal: Positive for back pain.   Integumentary:  Negative.   Neurological: Negative.    Hematological: Negative.   "  Psychiatric/Behavioral: Negative.    Breast: negative.       BP (!) 113/56   Pulse 91   Temp 98.2 °F (36.8 °C) (Oral)   Resp 16   Ht 5' 3" (1.6 m)   Wt 88.9 kg (196 lb)   LMP 2018   Breastfeeding? No   BMI 34.72 kg/m²     Physical Exam:   Constitutional: She appears well-developed. No distress.                             Alert and oriented to person, place and time.  HEENT:  Normocephalic, atraumatic, anicteric, moist mucus membranes.  Neck supple without masses.  LUNGS:  Clear to auscultation bilaterally.  HEART:  Regular rate & rhythm with physiologic heart sounds.  ABDOMEN:  Soft, non tender without any guarding, rigidity or rebound. Normoactive bowel sounds.  PELVIC:  Normal female external genitalia without gross lesions, rashes or excoriations. No gross vaginal or cervical lesions.  Adequate pelvis.  Cervix: 2 cm/40%/-2, soft.  EXTREMITIES:  Symmetric without cramping, claudication. Trace edema LE. +2 distal pulses.  Full range of motion.  SKIN:  No rashes, good turgor & capillary refill.  NEUROLOGIC:  Grossly intact bilaterally. +2 DTR, symmetric.  PSYCH:  Mood & affect appropriate.       Laboratory Data:     Recent Labs   Lab 19  1654   WBC 8.68   RBC 3.64*   HGB 9.2*   HCT 29.2*      MCV 80*   MCH 25.3*   MCHC 31.5*     ABO:  O POS  GBS:  negative    NST    Category: 1  Tocometry: irregular ctx    Assessment:     1. 23 y.o.  at 39w2d for induction of labor secondary to maternal request with favorable cervix  2. Rubella non-immune  3. Anemia, iron deficiency      Plan:    Admit to L&D for induction of labor    The patient was informed of the risks, benefits, alternatives and possible complications to induction of labor (with cervidil, cytotec, pitocin, and/or matos bulb), vaginal delivery, operative vaginal delivery,  section, blood transfusion, and the possibility of failed labor induction resulting in a  section due to maternal or fetal indications.  All " questions were answered to her satisfaction.  She voiced understanding and acceptance of these risks.  Informed consents were obtained for delivery, labor induction and blood transfusions.    Routine admit labs    Continuous fetal monitoring    Consult anesthesia, epidural prn    MMR after delivery    Fe supplementation when ready      George Robertson MD

## 2019-03-29 LAB
BASOPHILS # BLD AUTO: 0.02 K/UL (ref 0–0.2)
BASOPHILS NFR BLD: 0.2 % (ref 0–1.9)
DIFFERENTIAL METHOD: ABNORMAL
EOSINOPHIL # BLD AUTO: 0.1 K/UL (ref 0–0.5)
EOSINOPHIL NFR BLD: 0.5 % (ref 0–8)
ERYTHROCYTE [DISTWIDTH] IN BLOOD BY AUTOMATED COUNT: 13.9 % (ref 11.5–14.5)
HCT VFR BLD AUTO: 27.7 % (ref 37–48.5)
HGB BLD-MCNC: 8.3 G/DL (ref 12–16)
LYMPHOCYTES # BLD AUTO: 2.3 K/UL (ref 1–4.8)
LYMPHOCYTES NFR BLD: 19.3 % (ref 18–48)
MCH RBC QN AUTO: 24.7 PG (ref 27–31)
MCHC RBC AUTO-ENTMCNC: 30 G/DL (ref 32–36)
MCV RBC AUTO: 82 FL (ref 82–98)
MONOCYTES # BLD AUTO: 1.2 K/UL (ref 0.3–1)
MONOCYTES NFR BLD: 10.3 % (ref 4–15)
NEUTROPHILS # BLD AUTO: 8.2 K/UL (ref 1.8–7.7)
NEUTROPHILS NFR BLD: 69.2 % (ref 38–73)
PLATELET # BLD AUTO: 285 K/UL (ref 150–350)
PMV BLD AUTO: 9.6 FL (ref 9.2–12.9)
RBC # BLD AUTO: 3.36 M/UL (ref 4–5.4)
RPR SER QL: NORMAL
WBC # BLD AUTO: 11.89 K/UL (ref 3.9–12.7)

## 2019-03-29 PROCEDURE — 36415 COLL VENOUS BLD VENIPUNCTURE: CPT

## 2019-03-29 PROCEDURE — 85025 COMPLETE CBC W/AUTO DIFF WBC: CPT

## 2019-03-29 PROCEDURE — 11000001 HC ACUTE MED/SURG PRIVATE ROOM

## 2019-03-29 PROCEDURE — 25000003 PHARM REV CODE 250: Performed by: OBSTETRICS & GYNECOLOGY

## 2019-03-29 RX ADMIN — OXYCODONE AND ACETAMINOPHEN 1 TABLET: 5; 325 TABLET ORAL at 10:03

## 2019-03-29 RX ADMIN — OXYCODONE HYDROCHLORIDE AND ACETAMINOPHEN 1 TABLET: 10; 325 TABLET ORAL at 01:03

## 2019-03-29 RX ADMIN — OXYCODONE HYDROCHLORIDE AND ACETAMINOPHEN 1 TABLET: 10; 325 TABLET ORAL at 06:03

## 2019-03-29 RX ADMIN — IBUPROFEN 600 MG: 600 TABLET ORAL at 11:03

## 2019-03-29 RX ADMIN — IBUPROFEN 600 MG: 600 TABLET ORAL at 10:03

## 2019-03-29 RX ADMIN — IBUPROFEN 600 MG: 600 TABLET ORAL at 05:03

## 2019-03-29 NOTE — PLAN OF CARE
Problem: Adult Inpatient Plan of Care  Goal: Plan of Care Review  Outcome: Ongoing (interventions implemented as appropriate)  VSS, breastfeeding on demand, encouraged 8 times in 24 hours, wearing supportive bra. Fundus and lochia WDL. Voiding, passing flatus, ambulating and tolerating regular diet well. Bonding well with baby. Pain being managed with motrin and percocet as needed. Stated an understanding to POC. AM labs to be drawn

## 2019-03-29 NOTE — PROGRESS NOTES
"[facility]    Obstetrics & Gynecology  Progress Note      Patient Name:  Talia Marin  MRN:  [id number]  Admission Date:  3/27/2019  Hospital Length of Stay:  2  Attending Physician:  George Robertson MD  Primary Care Provider:  Cleve Forte MD    Subjective:     Date:  2019    Principal Problem:   (spontaneous vaginal delivery)    Hospital Course:  Post Partum Day # 1 - s/p vaginal delivery at 39w3d    Patient without major complaints  No acute events overnight  Denies dizziness, SOB, LEONE, or CP  Pain well controlled  Lochia less than menses  Bottle/breast feeding   Breast non-tender, non-engorged  Ambulating, tolerating regular diet, and voiding without diffculty  Bonding well with baby    Objective:     Temp:  [96.3 °F (35.7 °C)-100.8 °F (38.2 °C)] 97.3 °F (36.3 °C)  Pulse:  [] 94  Resp:  [16-20] 19  SpO2:  [87 %-100 %] 100 %  BP: ()/(49-90) 110/56    BP (!) 110/56 (BP Location: Right arm, Patient Position: Lying)   Pulse 94   Temp 97.3 °F (36.3 °C) (Oral)   Resp 19   Ht 5' 3" (1.6 m)   Wt 88.9 kg (196 lb)   LMP 2018   SpO2 100%   Breastfeeding? Yes   BMI 34.72 kg/m²     Intake/Output Summary (Last 24 hours) at 3/29/2019 1145  Last data filed at 3/29/2019 1143  Gross per 24 hour   Intake 1320 ml   Output 3285 ml   Net -1965 ml   Clear, bijal urine    Physical Exam:   Constitutional: She appears well-developed. No distress.                             Alert and oriented x 3.   HEENT:  No scleral icterus. Neck supple. Moist mucus membranes.   LUNGS:  Clear to auscultation bilaterally.   HEART:  Regular rate and rhythm with physiologic heart sounds.   ABDOMEN:  Soft, non-tender, non-distended, no guarding, rigidity, or rebound with normoactive bowel sounds.   FUNDUS:  Firm, nontender below the umbilicus.   EXTREMITIES:  No cramping, claudication.  Trace edema LE. +2 distal pulses. Symmetric, full range of motion, negative [place].  NEUROLOGIC:  Grossly intact " bilaterally. +2 DTR's.   PSYCH:  Mood and affect appropriate.   PERINEUM:  Intact with light lochia.    Labs:      Recent Results (from the past 336 hour(s))   CBC auto differential    Collection Time: 03/29/19  6:55 AM   Result Value Ref Range    WBC 11.89 3.90 - 12.70 K/uL    Hemoglobin 8.3 (L) 12.0 - 16.0 g/dL    Hematocrit 27.7 (L) 37.0 - 48.5 %    Platelets 285 150 - 350 K/uL   CBC with Auto Differential    Collection Time: 03/27/19  4:54 PM   Result Value Ref Range    WBC 8.68 3.90 - 12.70 K/uL    Hemoglobin 9.2 (L) 12.0 - 16.0 g/dL    Hematocrit 29.2 (L) 37.0 - 48.5 %    Platelets 338 150 - 350 K/uL     ABO:  O POS    Assessment:     1. Post-partum day # 1 - IUP at 39w3d s/p spontaneous vaginal delivery - progressing well  2. Anemia, iron deficiency    3. Rubella non-immune  4. Postpartum fever, resolved, no focal sign of infection on exam, likely medication effects, no sign of endometritis    Plan:     Continue post-partum care  Review post-partum care instructions and precautions  Encourage ambulation  Encourage breast-feeding  Fe supplementation when ready  MMR prior to discharge  Monitor fever, likely medication effects, supportive care  Delivery findings, labs/studies, and expectations were discussed with pt.  All questions answered and pt voiced understanding.     Disposition:  As patient meets milestones, will plan to discharge tomorrow.      George Robertson MD

## 2019-03-30 VITALS
WEIGHT: 196 LBS | HEART RATE: 86 BPM | DIASTOLIC BLOOD PRESSURE: 56 MMHG | TEMPERATURE: 97 F | HEIGHT: 63 IN | BODY MASS INDEX: 34.73 KG/M2 | RESPIRATION RATE: 19 BRPM | SYSTOLIC BLOOD PRESSURE: 103 MMHG | OXYGEN SATURATION: 100 %

## 2019-03-30 PROBLEM — Z3A.39 39 WEEKS GESTATION OF PREGNANCY: Status: RESOLVED | Noted: 2019-03-27 | Resolved: 2019-03-30

## 2019-03-30 PROCEDURE — 25000003 PHARM REV CODE 250: Performed by: OBSTETRICS & GYNECOLOGY

## 2019-03-30 PROCEDURE — 99024 POSTOP FOLLOW-UP VISIT: CPT | Mod: ,,, | Performed by: OBSTETRICS & GYNECOLOGY

## 2019-03-30 PROCEDURE — 99024 PR POST-OP FOLLOW-UP VISIT: ICD-10-PCS | Mod: ,,, | Performed by: OBSTETRICS & GYNECOLOGY

## 2019-03-30 RX ORDER — IBUPROFEN 600 MG/1
600 TABLET ORAL EVERY 6 HOURS PRN
Qty: 40 TABLET | Refills: 1 | Status: SHIPPED | OUTPATIENT
Start: 2019-03-30 | End: 2020-01-07

## 2019-03-30 RX ADMIN — IBUPROFEN 600 MG: 600 TABLET ORAL at 12:03

## 2019-03-30 RX ADMIN — IBUPROFEN 600 MG: 600 TABLET ORAL at 06:03

## 2019-03-30 RX ADMIN — OXYCODONE AND ACETAMINOPHEN 1 TABLET: 5; 325 TABLET ORAL at 07:03

## 2019-03-30 NOTE — NURSING
Discharged instructions given to pt verbalized understanding. NAD noted Denies any discomfort.Pt will remain as boarder.

## 2019-03-30 NOTE — HOSPITAL COURSE
See History & Physical for complete details.  In brief, this is a 23 y.o.  at 39w3d gestation admitted for induction of labor secondary to maternal request with favorable cervix.  The induction was initiated with Cervidil x 1 dose followed by pitocin.  Pt progressed well through the active phase of labor and delivered a viable infant.  Maternal and fetal status was overall reassuring throughout the labor course.  AROM 3/28/2019 ~9:00 AM with moderate, clear fluid, no odor and had no intrapartum fever.    Postpartum course was benign.  She is breast-feeding well.  Exam was benign with patient afebrile, vitals stable, and minimal bleeding.  Normal activities.  Patient discharged home on postpartum day #2, 3/30/2019   Discharge medications include Motrin, prenatal vitamins, and iron supplement.  Follow-up with Dr Robertson in 6 weeks.    Infant is getting iv antibiotics for maternal fever in labor.

## 2019-03-30 NOTE — NURSING
Mon instructed to ambulate in hallway. Dad at bedside. Medicated as ordered. Pt verbalized understanding.

## 2019-03-30 NOTE — DISCHARGE SUMMARY
[facility]  Obstetrics  Discharge Summary      Patient Name: Talia Marin  MRN: [id number]  Admission Date: 3/27/2019  Hospital Length of Stay: 3 days  Discharge Date and Time: 3/30/2019  Attending Physician: George Robertson MD   Discharging Provider: Cleve Cowan MD  Primary Care Provider: Cleve Forte MD    HPI: Talia Marin is a 23 y.o.  at 39w2d admitted for induction of labor secondary to maternal request with favorable cervix.        * No surgery found *     Hospital Course:   See History & Physical for complete details.  In brief, this is a 23 y.o.  at 39w3d gestation admitted for induction of labor secondary to maternal request with favorable cervix.  The induction was initiated with Cervidil x 1 dose followed by pitocin.  Pt progressed well through the active phase of labor and delivered a viable infant.  Maternal and fetal status was overall reassuring throughout the labor course.  AROM 3/28/2019 ~9:00 AM with moderate, clear fluid, no odor and had no intrapartum fever.    Postpartum course was benign.  She is breast-feeding well.  Exam was benign with patient afebrile, vitals stable, and minimal bleeding.  Normal activities.  Patient discharged home on postpartum day #2, 3/30/2019   Discharge medications include Motrin, prenatal vitamins, and iron supplement.  Follow-up with Dr Robertson in 6 weeks.    Infant is getting iv antibiotics for maternal fever in labor.                Final Active Diagnoses:    Diagnosis Date Noted POA    PRINCIPAL PROBLEM:   (spontaneous vaginal delivery) [O80] 2019 Not Applicable      Problems Resolved During this Admission:    Diagnosis Date Noted Date Resolved POA    39 weeks gestation of pregnancy [Z3A.39] 2019 Not Applicable        Labs:   CBC   Recent Labs   Lab 19  0655   WBC 11.89   HGB 8.3*   HCT 27.7*       and All labs within the past 24 hours have been reviewed    Feeding Method:  breast    Immunizations     Date Immunization Status Dose Route/Site Given by    03/28/19 1842 MMR Incomplete 0.5 mL Subcutaneous/Left deltoid           Delivery:    Episiotomy:     Lacerations: 2nd   Repair suture:     Repair # of packets: 1   Blood loss (ml): 0     Birth information:  YOB: 2017   Time of birth: 5:30 PM   Sex: male   Delivery type: Vaginal, Spontaneous   Gestational Age: 39w3d    Delivery Clinician:      Other providers:       Additional  information:  Forceps:    Vacuum:    Breech:    Observed anomalies      Living?:           APGARS  One minute Five minutes Ten minutes   Skin color:         Heart rate:         Grimace:         Muscle tone:         Breathing:         Totals: 9  9        Placenta: Delivered:       appearance    Pending Diagnostic Studies:     None          Discharged Condition: good    Disposition: Home or Self Care    Follow Up:  Follow-up Information     George Robertson MD In 6 weeks.    Specialty:  Obstetrics and Gynecology  Contact information:  [address]  [address] [place] [address]  [phone]                 Patient Instructions:      Call MD for:  temperature >100.4     Call MD for:  persistent nausea and vomiting or diarrhea     Call MD for:  severe uncontrolled pain     Call MD for:  redness, tenderness, or signs of infection (pain, swelling, redness, odor or green/yellow discharge around incision site)     Call MD for:  difficulty breathing or increased cough     Call MD for:  severe persistent headache     Call MD for:  worsening rash     Call MD for:  persistent dizziness, light-headedness, or visual disturbances     Call MD for:  increased confusion or weakness     No dressing needed     Medications:  Current Discharge Medication List      START taking these medications    Details   ibuprofen (ADVIL,MOTRIN) 600 MG tablet Take 1 tablet (600 mg total) by mouth every 6 (six) hours as needed (cramping).  Qty: 40 tablet, Refills: 1         CONTINUE  these medications which have NOT CHANGED    Details   ferrous sulfate (FEOSOL) 325 mg (65 mg iron) Tab tablet Take 1 tablet (325 mg total) by mouth 2 (two) times daily.  Qty: 60 tablet, Refills: 1    Associated Diagnoses: Anemia affecting pregnancy in third trimester      prenatal vits62/FA/om3/dha/epa (PRENATAL GUMMY ORAL) Take by mouth.         STOP taking these medications       docusate sodium (COLACE) 100 MG capsule Comments:   Reason for Stopping:         ondansetron (ZOFRAN-ODT) 4 MG TbDL Comments:   Reason for Stopping:               Cleve Cowan MD  Obstetrics  [facility]

## 2019-03-30 NOTE — ASSESSMENT & PLAN NOTE
Patient discharged home on postpartum day #2, 3/30/2019   Discharge medications include Motrin, prenatal vitamins, and iron supplement.  Follow-up with Dr Robertson in 6 weeks.     Infant is getting iv antibiotics for maternal fever in labor.

## 2019-03-30 NOTE — HPI
Talia Tomas is a 23 y.o.  at 39w2d admitted for induction of labor secondary to maternal request with favorable cervix.

## 2019-03-30 NOTE — DISCHARGE INSTRUCTIONS
 Breastfeeding Discharge Instructions      AAP recommendation of exclusive breastfeeding for the first 6 months of life and continued breastfeeding with the introduction of supplemental foods beyond the first year of life and recommends to delay all bottle and pacifier use until after 4 weeks of age and breastfeeding is well established.  Discussed the benefits of exclusive breastfeeding for both mother and baby.  Discussed the risks of supplementation/pacifier use on the exclusivity of breastfeeding in the first 6 months. Feed the baby at the earliest sign of hunger or comfort  o Hands to mouth, sucking motions  o Rooting or searching for something to suck on  o Dont wait for crying - it is a not a late sign of hunger; it is a sign of distress     The feedings may be 8-12 times per 24hrs and will not follow a schedule   Alternate the breast you start the feeding with, or start with the breast that feels the fullest   Switch breasts when the baby takes himself off the breast or falls asleep   Keep offering breasts until the baby looks full, no longer gives hunger signs, and stays asleep when placed on his back in the crib   If the baby is sleepy and wont wake for a feeding, put the baby skin-to-skin dressed in a diaper against the mothers bare chest   Sleep near your baby   The baby should be positioned and latched on to the breast correctly  o Chest-to-chest, chin in the breast  o Babys lips are flipped outward  o Babys mouth is stretched open wide like a shout  o Babys sucking should feel like tugging to the mother  - The baby should be drinking at the breast:  o You should hear swallowing or gulping throughout the feeding  o You should see milk on the babys lips when he comes off the breast  o Your breasts should be softer when the baby is finished feeding  o The baby should look relaxed at the end of feedings  o After the 4th day and your milk is in:  o The babys poop should turn bright  yellow and be loose, watery, and seedy  o The baby should have at least 3-4 poops the size of the palm of your hand per day  o The baby should have at least 6-8 wet diapers per day  o The urine should be light yellow in color  You should drink when you are thirsty and eat a healthy diet when you are    hungry.     Take naps to get the rest you need.   Take medications and/or drink alcohol only with permission of your obstetrician    or the babys pediatrician.  You can also call the [facility],   ([phone]), Monday-Friday, 8am-5pm Central time, to get the most   up-to-date evidence-based information on the use of medications during   pregnancy and breastfeeding.      The baby should be examined by a pediatrician at 3-5 days of age; unless ordered sooner by the pediatrician.  Once your milk comes in, the baby should be back to birth weight no later than 10-14 days of age.    Primary Engorgement    If the milk is flowing, use wet or dry heat applied to the breasts for approximately 10min prior to each feeding as a comfort measure to facilitate the milk ejection reflex    Follow heat treatment with breast massage to soften hard/lumpy areas of the breast    Use unrestricted, frequent, effective feedings.      Wake baby to feed if necessary    Avoid pacifier and bottle feedings    Hand express or pump breasts to the point of comfort prn    Use cold treatments in the form of ice packs/gel packs/ frozen vegetables wrapped in a soft thin cloth and applied to the breasts for approximately 20min after each feeding until engorgement is resolved    Wear comfortable, supportive bra    Take pain medicine prn    Use anti-inflammatory medications if prescribed by physician    Other:    [facility] Pumping Instructions :    Preparation and Hygiene:    1. Shower daily.  2. Wear a clean bra each day and wash daily in warm soapy water.  3. Change wet or moist breast pads frequently.  Moist pads can promote growth of  germs.  4. Actively wash your hands, paying close attention to the area around and under your fingernails, thoroughly with soap and water for 15 seconds before pumping or handling your milk.  Re-wash your hands if you touch anything (scratching your nose, answering the phone, etc) while pumping or handling your milk.   5. Before pumping your breasts, assemble the pump collection kit and have ready the sterile container and labels.  Place these items on a clean surface next to the breastpump.  6. Each time after you have finished pumping, take apart all of the parts of the breastpump collection kit and place them in a separate cleaning container (do not place them in the sink).  Be sure to remove the yellow valve from the breastshield and separate the white membrane from the yellow valve.  Rinse all of these parts with cool water.  Then use a new sponge and/or bottle brush and dishwashing detergent to clean the parts.  Rinse off the soapy water with cool water and air dry on a clean towel covered with a clean cloth.  All parts may also be washed after each use in the top rack of a .  7. Once each day, sterilize all of the parts of the breastpump collection kit.  This can be done by boiling the kit parts for 10 minutes or by using a Quick Clean Micro-Steam Bag made by [employer]  8. If condensation appears in the tubing, continue to run the pump with the tubing attached for 1-2 minutes or until the tubing is dry.   9. Notify your babys nurse or doctor if you become ill or need to take any medication, even over-the-counter medicines.        Collection and Storage of Expressed Breastmilk:         1. Pump your breasts at least 8-10 times every 24 hours.  Double pump (both breasts at  the same time) for at least 15-20 minutes using the most suction that is comfortable.    2. Write the date and time of pumping and the name of any medications you are takingon the babys pre-printed hospital identification  label.   3.    Do not touch the inside of the storage containers or lids.      4.        Tightly screw the lid onto the container and place immediately into the                                refrigerator for daily use.  Bottle may remain at room temperature if the next                    feeding is within 4 hours.  5.    Expressed breastmilk should be refrigerated or frozen within 4 hours of                pumping.  6.        Do not store expressed breastmilk on the door of your refrigerator or freeze             where the temperature is warmer.   7.        Refrigerated milk may be stored in for up to 7 days.  At this point it can be              moved to the freezer for 6 -12 months.  8.        Thaw frozen breast milk in overnight in the refrigerator.  Once milk is thawed it              must be used within 24 hours.  9.        Refrigerated breast milk needs to be warmed to room temperature.  Warm by leaving unrefrigerated until it reached room temperature, or place sealed bottle into a cup of warm (not boiling) water or use a bottle warmer.               Never warm breast milk in a microwave or boiling water.    For any questions or concerns call:  Lactation Department at [phone]

## 2019-04-01 ENCOUNTER — TELEPHONE (OUTPATIENT)
Dept: OBSTETRICS AND GYNECOLOGY | Facility: CLINIC | Age: 24
End: 2019-04-01

## 2019-04-01 NOTE — TELEPHONE ENCOUNTER
Spoke with pt. Pt informed we received on paper from disability. Pt feels it should have been more. Pt will call her employer to find out.

## 2019-04-01 NOTE — ANESTHESIA POSTPROCEDURE EVALUATION
Anesthesia Post Evaluation    Patient: Talia Marin    Procedure(s) Performed: * No procedures listed *    Final Anesthesia Type: epidural  Patient location during evaluation: labor & delivery  Patient participation: Yes- Able to Participate  Level of consciousness: awake and alert and oriented  Post-procedure vital signs: reviewed and stable  Pain management: adequate  Airway patency: patent  PONV status at discharge: No PONV  Anesthetic complications: no      Cardiovascular status: blood pressure returned to baseline, stable and hemodynamically stable  Respiratory status: unassisted, room air and spontaneous ventilation  Hydration status: euvolemic  Follow-up not needed.          Vitals Value Taken Time   /56 3/30/2019  7:07 AM   Temp 36.1 °C (97 °F) 3/30/2019  7:07 AM   Pulse 86 3/30/2019  7:07 AM   Resp 19 3/30/2019  7:07 AM   SpO2 100 % 3/28/2019  5:30 PM         No case tracking events are documented in the log.      Pain/Catie Score: No data recorded

## 2019-04-01 NOTE — TELEPHONE ENCOUNTER
----- Message from Kelsy havenrosette sent at 4/1/2019  8:42 AM CDT -----  Contact: self - [phone]  Pt states her employer will be sending over abstinence of leave paper work this morning and asking that it is completed and returned by 04/05.

## 2019-04-02 ENCOUNTER — POSTPARTUM VISIT (OUTPATIENT)
Dept: OBSTETRICS AND GYNECOLOGY | Facility: CLINIC | Age: 24
End: 2019-04-02
Payer: COMMERCIAL

## 2019-04-02 VITALS
SYSTOLIC BLOOD PRESSURE: 114 MMHG | BODY MASS INDEX: 33.91 KG/M2 | HEIGHT: 63 IN | WEIGHT: 191.38 LBS | DIASTOLIC BLOOD PRESSURE: 72 MMHG

## 2019-04-02 PROCEDURE — 99499 UNLISTED E&M SERVICE: CPT | Mod: S$GLB,,, | Performed by: OBSTETRICS & GYNECOLOGY

## 2019-04-02 PROCEDURE — 99999 PR PBB SHADOW E&M-EST. PATIENT-LVL II: ICD-10-PCS | Mod: PBBFAC,,, | Performed by: OBSTETRICS & GYNECOLOGY

## 2019-04-02 PROCEDURE — 99999 PR PBB SHADOW E&M-EST. PATIENT-LVL II: CPT | Mod: PBBFAC,,, | Performed by: OBSTETRICS & GYNECOLOGY

## 2019-04-02 PROCEDURE — 99499 NO LOS: ICD-10-PCS | Mod: S$GLB,,, | Performed by: OBSTETRICS & GYNECOLOGY

## 2019-04-02 NOTE — PROGRESS NOTES
"[facility]  Ambulatory Clinic  Obstetrics & Gynecology    Date of Visit:  2019    Chief Complaint:  Post-partum visit    Subjective:      Talia Marin is a 23 y.o.  who is s/p spontaneous vaginal delivery at term on 3/28/2019 here for post-partum visit.  Pt c/o discomfort with urination "when urine hit superficial vulvar skin lacerations it stings".  Pt had a second degree midline laceration, repaired. Otherwise, pt has no major complaints today.  Pt had an uneventful post-partum hospital course and has been doing well since delivery.  Pt reports baby is doing well and she is breast/bottle feeding without difficulty.  Her lochia scant.  Pt denies any abdominal/pelvic pain, fevers, abnormal vaginal discharge, symptoms of post-partum blues or depression, breast complaints, GI or urinary compliants.  Pt is undecided on birth control at this time.    Review of Systems:      CONSTITUTIONAL:  No fever, chills, weakness, fatigue, decreased activity  HEENT: No headaches, vision changes  LUNGS:  No shortness of breath  HEART:  No palpitations, chest pain, abnormal swelling  BREAST:  No lump, pain, redness, skin changes  GI:  No nausea, vomiting, diarrhea, constipation, abdomen pain, blood in stool  URINARY:  No dysuria, hematuria, frequency  SKIN:  No rash, bruising  NEURO:  No headache, weakness  PSYCH:  No anxiety, depression, suicidal or homicidal ideations    Objective:     /72 (BP Location: Right arm)   Ht 5' 3" (1.6 m)   Wt 86.8 kg (191 lb 5.8 oz)   LMP 2018   BMI 33.90 kg/m²      GENERAL:  NAD, A&Ox3, well nourished.  HEENT:  NCAT,moist mucus membranes, neck supple.  BREAST:  Pt deferred today.    LUNGS:  CTA-B.  HEART:  RRR with physiologic heart sounds.  ABDOMEN:  Soft, non-tender, non-distended without any guarding, rigidity or rebound. Normoactive bowel sounds.  Fundus firm, non-tender, below umbilicus.  EXT:  Symmetric. No cramping, claudication, or edema. +2 " distal pulses. FROM.  SKIN:  No rashes, good turgor & capillary refill.  NEURO:  Grossly intact bilaterally. +2 DTR.  PSYCH:  Mood & affect appropriate.     PELVIC:  Pt deferred today.      Chaperone present for exam.    Assessment:     23 y.o.  s/p  at term - doing well post-partum    Plan:    Post-partum care instructions and precautions reviewed.  Pelvic rest x 6 wks post-partum.  Continue prenatal vitamins, fergon and colace.       Discussed supportive care for dysuria/perineal care.  Doubt UTI.  UTI precautions.        Return 4 wks, or sooner as needed.  Pt voiced understanding.       George Robertson MD

## 2019-04-03 ENCOUNTER — TELEPHONE (OUTPATIENT)
Dept: OBSTETRICS AND GYNECOLOGY | Facility: HOSPITAL | Age: 24
End: 2019-04-03

## 2019-04-03 NOTE — TELEPHONE ENCOUNTER
"Lactation Telephone Follow up:  Spoke with pt.  Reports breastfeeding well q 2 hours for 10 - 15 minutes per feed.  Milk is in and breasts soften with feedings.  Reports a wet and yellow stool with almost every feeding.  Denies any c/o or concerns.  Has ped appt tomorrow; instructed to keep as scheduled.  Hotline # given.  States "understand".  "

## 2019-04-26 ENCOUNTER — POSTPARTUM VISIT (OUTPATIENT)
Dept: OBSTETRICS AND GYNECOLOGY | Facility: CLINIC | Age: 24
End: 2019-04-26
Payer: COMMERCIAL

## 2019-04-26 VITALS
WEIGHT: 175.94 LBS | DIASTOLIC BLOOD PRESSURE: 61 MMHG | HEIGHT: 63 IN | BODY MASS INDEX: 31.17 KG/M2 | SYSTOLIC BLOOD PRESSURE: 119 MMHG

## 2019-04-26 DIAGNOSIS — Z30.09 FAMILY PLANNING: ICD-10-CM

## 2019-04-26 PROCEDURE — 99499 UNLISTED E&M SERVICE: CPT | Mod: S$GLB,,, | Performed by: OBSTETRICS & GYNECOLOGY

## 2019-04-26 PROCEDURE — 99999 PR PBB SHADOW E&M-EST. PATIENT-LVL III: CPT | Mod: PBBFAC,,, | Performed by: OBSTETRICS & GYNECOLOGY

## 2019-04-26 PROCEDURE — 99499 NO LOS: ICD-10-PCS | Mod: S$GLB,,, | Performed by: OBSTETRICS & GYNECOLOGY

## 2019-04-26 PROCEDURE — 99999 PR PBB SHADOW E&M-EST. PATIENT-LVL III: ICD-10-PCS | Mod: PBBFAC,,, | Performed by: OBSTETRICS & GYNECOLOGY

## 2019-04-26 RX ORDER — NORGESTIMATE AND ETHINYL ESTRADIOL 0.25-0.035
1 KIT ORAL DAILY
Qty: 30 TABLET | Refills: 3 | Status: SHIPPED | OUTPATIENT
Start: 2019-04-26 | End: 2020-01-07

## 2019-04-29 NOTE — PROGRESS NOTES
"[facility]  Ambulatory Clinic  Obstetrics & Gynecology    Date of Visit:  2019    Chief Complaint:  Post-partum visit    Subjective:      Talia Marin is a 23 y.o.  who is s/p spontaneous vaginal delivery at term on 3/28/2019 here for post-partum visit.    Pt has no major complaints today.  Pt doing well since delivery.  Pt reports doing well since delivery.    Pt reports baby is doing well.  Pt is breast/bottle feeding without difficulty.    Lochia resolved.    Pt denies any abdominal/pelvic pain, fevers, abnormal vaginal discharge, symptoms of post-partum blues or depression, breast complaints, GI or urinary compliants.    Pt is requesting Kyleena and will use OCP in meantime.  Pt has tolerated OCP will in the past.    Review of Systems:      CONSTITUTIONAL:  No fever, chills, weakness, fatigue, decreased activity  HEENT: No headaches, vision changes  LUNGS:  No shortness of breath  HEART:  No palpitations, chest pain, abnormal swelling  BREAST:  No lump, pain, redness, skin changes  GI:  No nausea, vomiting, diarrhea, constipation, abdomen pain, blood in stool  URINARY:  No dysuria, hematuria, frequency  SKIN:  No rash, bruising  NEURO:  No headache, weakness  PSYCH:  No anxiety, depression, suicidal or homicidal ideations    Objective:     /61 (BP Location: Right arm)   Ht 5' 3" (1.6 m)   Wt 79.8 kg (175 lb 14.8 oz)   LMP 2018   Breastfeeding? Yes   BMI 31.16 kg/m²      GENERAL:  NAD, A&Ox3, well nourished.  HEENT:  NCAT,moist mucus membranes, neck supple.  BREAST:  Symmetric, non-tender, no obvious masses, no skin changes, no nipple discharge.   LUNGS:  CTA-B.  HEART:  RRR with physiologic heart sounds.  ABDOMEN:  Soft, non-tender, non-distended without any guarding, rigidity or rebound. Normoactive bowel sounds.  Fundus firm, non-tender, below umbilicus.  EXT:  Symmetric. No cramping, claudication, or edema. +2 distal pulses. FROM.  SKIN:  No rashes, good " turgor & capillary refill.  NEURO:  Grossly intact bilaterally. +2 DTR.  PSYCH:  Mood & affect appropriate.     GENITOURINARY:  NFEG no lesion. No vaginal or cervical lesion. No bleeding or discharge. Good support. No CMT. Uterus and ovaries small, NT. Declined rectal exam. No obvious external lesions.    Chaperone present for exam.    Assessment:     1. 23 y.o.  s/p  at term  2. Family planning - order Kyleena IUD    Plan:    Post-partum care instructions and precautions reviewed.  Continue prenatal vitamins.    We discuss her contraceptive options.  Pt is requesting Kyleena IUD.  Risks, benefits, and alternatives to IUD/OCP reviewed.  Will schedule pt for IUD insertion on received pending insurance verification.  In meantime time, pt will use OCP.      Return 4 wks, or sooner as needed.  Pt voiced understanding.       George Robertson MD

## 2019-05-08 ENCOUNTER — TELEPHONE (OUTPATIENT)
Dept: PHARMACY | Facility: CLINIC | Age: 24
End: 2019-05-08

## 2019-05-15 ENCOUNTER — TELEPHONE (OUTPATIENT)
Dept: OBSTETRICS AND GYNECOLOGY | Facility: CLINIC | Age: 24
End: 2019-05-15

## 2019-05-15 NOTE — TELEPHONE ENCOUNTER
----- Message from Lebron Castañeda sent at 5/15/2019  1:43 PM CDT -----  Contact: Virginie  with [employer] [phone]  Type: Patient Call Back    Who called:Virginie    What is the request in detail: Virginie  with the patient's insurance company, [employer], is calling to get all of the office notes from the month of March, faxed over in regards to the patient. It  Can be faxed to: [phone]    Can the clinic reply by [facility]?no    Would the patient rather a call back or a response via [facility]? Call back    Best call back number:[phone]      Spoke with [employer] and they are aware that the records being requested will need to be requested from there medical records dept. The medical office can be release these records that are being requested

## 2019-05-17 ENCOUNTER — TELEPHONE (OUTPATIENT)
Dept: OBSTETRICS AND GYNECOLOGY | Facility: CLINIC | Age: 24
End: 2019-05-17

## 2019-06-18 ENCOUNTER — TELEPHONE (OUTPATIENT)
Dept: OBSTETRICS AND GYNECOLOGY | Facility: CLINIC | Age: 24
End: 2019-06-18

## 2019-08-09 ENCOUNTER — OFFICE VISIT (OUTPATIENT)
Dept: FAMILY MEDICINE | Facility: CLINIC | Age: 24
End: 2019-08-09
Payer: COMMERCIAL

## 2019-08-09 VITALS
WEIGHT: 173.06 LBS | BODY MASS INDEX: 30.66 KG/M2 | DIASTOLIC BLOOD PRESSURE: 60 MMHG | SYSTOLIC BLOOD PRESSURE: 100 MMHG | OXYGEN SATURATION: 99 % | TEMPERATURE: 99 F | HEART RATE: 83 BPM | HEIGHT: 63 IN

## 2019-08-09 DIAGNOSIS — Z13.220 LIPID SCREENING: ICD-10-CM

## 2019-08-09 DIAGNOSIS — Z12.4 PAP SMEAR FOR CERVICAL CANCER SCREENING: ICD-10-CM

## 2019-08-09 DIAGNOSIS — D50.9 MICROCYTIC ANEMIA: ICD-10-CM

## 2019-08-09 DIAGNOSIS — L65.9 HAIR LOSS: Primary | ICD-10-CM

## 2019-08-09 DIAGNOSIS — R06.02 SOB (SHORTNESS OF BREATH): ICD-10-CM

## 2019-08-09 PROCEDURE — 99204 PR OFFICE/OUTPT VISIT, NEW, LEVL IV, 45-59 MIN: ICD-10-PCS | Mod: S$GLB,,, | Performed by: INTERNAL MEDICINE

## 2019-08-09 PROCEDURE — 99999 PR PBB SHADOW E&M-EST. PATIENT-LVL III: CPT | Mod: PBBFAC,,, | Performed by: INTERNAL MEDICINE

## 2019-08-09 PROCEDURE — 99999 PR PBB SHADOW E&M-EST. PATIENT-LVL III: ICD-10-PCS | Mod: PBBFAC,,, | Performed by: INTERNAL MEDICINE

## 2019-08-09 PROCEDURE — 3008F PR BODY MASS INDEX (BMI) DOCUMENTED: ICD-10-PCS | Mod: CPTII,S$GLB,, | Performed by: INTERNAL MEDICINE

## 2019-08-09 PROCEDURE — 99204 OFFICE O/P NEW MOD 45 MIN: CPT | Mod: S$GLB,,, | Performed by: INTERNAL MEDICINE

## 2019-08-09 PROCEDURE — 3008F BODY MASS INDEX DOCD: CPT | Mod: CPTII,S$GLB,, | Performed by: INTERNAL MEDICINE

## 2019-08-09 NOTE — PROGRESS NOTES
SUBJECTIVE     Chief Complaint   Patient presents with    Establish Care    Hair Loss     loosing chunks of hair at a time and concerned. Ongoing x couple weeks    Shortness of Breath     still having sob since having kid.       HPI  Talia Marin is a 24 y.o. female with multiple medical diagnoses as listed in the medical history and problem list that presents for evaluation of hair loss x 3-4 weeks. Pt reports losing chunks of hair for the past few weeks. It is mostly coming out plentiful with combing, but it also occurs when she runs her fingers through her hair. She believes it to be more than 100 strands of hair loss per day. She did have a recent stressor, but denies any current stress. She has not been taking/applying any meds.    SOB- x 1 year. Pt reports LEONE with even small walks. Denies any SOB at rest, cough, chest tightness, wheezing, or orthopnea. Pt sits down to rest for a few moments and is then able to catch her breath with ease.      PAST MEDICAL HISTORY:  Past Medical History:   Diagnosis Date    Anemia     History of blood transfusion 2014    After d&c in 2014 (post 16 week twin delivery)       PAST SURGICAL HISTORY:  Past Surgical History:   Procedure Laterality Date    DILATION AND CURETTAGE OF UTERUS         SOCIAL HISTORY:  Social History     Socioeconomic History    Marital status: Single     Spouse name: Not on file    Number of children: Not on file    Years of education: Not on file    Highest education level: Not on file   Occupational History    Not on file   Social Needs    Financial resource strain: Not very hard    Food insecurity:     Worry: Never true     Inability: Never true    Transportation needs:     Medical: No     Non-medical: No   Tobacco Use    Smoking status: Never Smoker    Smokeless tobacco: Never Used   Substance and Sexual Activity    Alcohol use: No     Frequency: Monthly or less     Drinks per session: 1 or 2     Binge frequency: Less than monthly     Drug use: No    Sexual activity: Yes     Partners: Male     Birth control/protection: None   Lifestyle    Physical activity:     Days per week: 3 days     Minutes per session: 60 min    Stress: Very much   Relationships    Social connections:     Talks on phone: More than three times a week     Gets together: Twice a week     Attends Confucianist service: Not on file     Active member of club or organization: No     Attends meetings of clubs or organizations: Never     Relationship status: Living with partner   Other Topics Concern    Not on file   Social History Narrative    Not on file       FAMILY HISTORY:  Family History   Problem Relation Age of Onset    Hyperlipidemia Father     Hypertension Father     Arrhythmia Neg Hx     Cardiomyopathy Neg Hx     Congenital heart disease Neg Hx     Heart attacks under age 50 Neg Hx     Pacemaker/defibrilator Neg Hx        ALLERGIES AND MEDICATIONS: updated and reviewed.  Review of patient's allergies indicates:  No Known Allergies  Current Outpatient Medications   Medication Sig Dispense Refill    ferrous sulfate (FEOSOL) 325 mg (65 mg iron) Tab tablet Take 1 tablet (325 mg total) by mouth 2 (two) times daily. 60 tablet 1    ibuprofen (ADVIL,MOTRIN) 600 MG tablet Take 1 tablet (600 mg total) by mouth every 6 (six) hours as needed (cramping). 40 tablet 1    levonorgestrel (KYLEENA) 17.5 mcg/24 hrs (5 yrs) 19.5 mg IUD 1 Intra Uterine Device (17.5 mcg total) by Intrauterine route once. for 1 dose 1 Intra Uterine Device 0    norgestimate-ethinyl estradiol (SPRINTEC, 28,) 0.25-35 mg-mcg per tablet Take 1 tablet by mouth once daily. 30 tablet 3    prenatal vits62/FA/om3/dha/epa (PRENATAL GUMMY ORAL) Take by mouth.       No current facility-administered medications for this visit.        ROS  Review of Systems   Constitutional: Positive for activity change. Negative for unexpected weight change.   HENT: Negative for hearing loss, rhinorrhea and trouble  "swallowing.    Eyes: Negative for discharge and visual disturbance.   Respiratory: Negative for chest tightness and wheezing.    Cardiovascular: Negative for chest pain and palpitations.   Gastrointestinal: Negative for blood in stool, constipation, diarrhea and vomiting.   Endocrine: Negative for polydipsia and polyuria.   Genitourinary: Positive for menstrual problem. Negative for difficulty urinating, dysuria and hematuria.   Musculoskeletal: Negative for arthralgias, joint swelling and neck pain.   Skin: Negative for rash and wound.   Neurological: Positive for headaches. Negative for weakness.   Psychiatric/Behavioral: Positive for dysphoric mood. Negative for confusion.         OBJECTIVE     Physical Exam  Vitals:    08/09/19 1500   BP: 100/60   Pulse: 83   Temp: 98.9 °F (37.2 °C)    Body mass index is 30.66 kg/m².  Weight: 78.5 kg (173 lb 1 oz)   Height: 5' 3" (160 cm)     Physical Exam   Constitutional: She is oriented to person, place, and time. She appears well-developed and well-nourished. No distress.   HENT:   Head: Normocephalic and atraumatic.   Right Ear: External ear normal.   Left Ear: External ear normal.   Nose: Nose normal.   Mouth/Throat: Oropharynx is clear and moist.   Eyes: Conjunctivae and EOM are normal. Right eye exhibits no discharge. Left eye exhibits no discharge. No scleral icterus.   Neck: Normal range of motion. Neck supple. No JVD present. No tracheal deviation present.   Cardiovascular: Normal rate, regular rhythm and intact distal pulses. Exam reveals no gallop and no friction rub.   No murmur heard.  Pulmonary/Chest: Effort normal and breath sounds normal. No respiratory distress. She has no wheezes.   Abdominal: Soft. Bowel sounds are normal. She exhibits no distension and no mass. There is no tenderness. There is no rebound and no guarding.   Musculoskeletal: Normal range of motion. She exhibits no edema, tenderness or deformity.   Neurological: She is alert and oriented to " person, place, and time. She exhibits normal muscle tone. Coordination normal.   Skin: Skin is warm and dry. No rash noted. No erythema.   Psychiatric: She has a normal mood and affect. Her behavior is normal. Judgment and thought content normal.         Health Maintenance       Date Due Completion Date    Lipid Panel 1997 ---    Pap Smear 06/25/2018 ---    Influenza Vaccine (1) 08/01/2019 1/11/2019    CHLAMYDIA SCREENING 11/28/2019 11/28/2018    TETANUS VACCINE 01/11/2029 1/11/2019            ASSESSMENT     24 y.o. female with     1. Hair loss    2. SOB (shortness of breath)    3. Microcytic anemia    4. Lipid screening    5. Pap smear for cervical cancer screening        PLAN:     1. Hair loss  - Unknown etiology, so will plan for labs; ?VALERIE  - Comprehensive metabolic panel; Future  - CBC auto differential; Future  - TSH; Future    2. SOB (shortness of breath)  - Likely 2/2 anemia given lab review, but will also send D dimer  - CBC auto differential; Future  - D dimer, quantitative; Future    3. Microcytic anemia  - Further workup as below  - Iron and TIBC; Future  - Ferritin; Future  - Folate; Future  - Vitamin B12; Future  - Hemoglobin Electrophoresis,Hgb A2 Luis Angel.; Future    4. Lipid screening  - Lipid panel; Future    5. Pap smear for cervical cancer screening  - Pt to f/u with Ob/Gyn        RTC in 2 weeks for repeat assessment of current treatment plan       Yen Luque MD  08/09/2019 3:19 PM        No follow-ups on file.

## 2019-12-13 ENCOUNTER — TELEPHONE (OUTPATIENT)
Dept: OBSTETRICS AND GYNECOLOGY | Facility: CLINIC | Age: 24
End: 2019-12-13

## 2020-01-07 ENCOUNTER — OFFICE VISIT (OUTPATIENT)
Dept: OBSTETRICS AND GYNECOLOGY | Facility: CLINIC | Age: 25
End: 2020-01-07
Payer: COMMERCIAL

## 2020-01-07 VITALS
BODY MASS INDEX: 31.29 KG/M2 | DIASTOLIC BLOOD PRESSURE: 74 MMHG | HEIGHT: 63 IN | SYSTOLIC BLOOD PRESSURE: 110 MMHG | WEIGHT: 176.56 LBS

## 2020-01-07 DIAGNOSIS — Z36.89 ENCOUNTER TO ESTABLISH GESTATIONAL AGE USING ULTRASOUND: ICD-10-CM

## 2020-01-07 DIAGNOSIS — Z32.00 ENCOUNTER FOR CONFIRMATION OF PREGNANCY TEST RESULT WITH PHYSICAL EXAMINATION: Primary | ICD-10-CM

## 2020-01-07 DIAGNOSIS — R11.0 NAUSEA: ICD-10-CM

## 2020-01-07 LAB
B-HCG UR QL: POSITIVE
CTP QC/QA: YES

## 2020-01-07 PROCEDURE — 3008F PR BODY MASS INDEX (BMI) DOCUMENTED: ICD-10-PCS | Mod: CPTII,S$GLB,, | Performed by: OBSTETRICS & GYNECOLOGY

## 2020-01-07 PROCEDURE — 99999 PR PBB SHADOW E&M-EST. PATIENT-LVL III: ICD-10-PCS | Mod: PBBFAC,,, | Performed by: OBSTETRICS & GYNECOLOGY

## 2020-01-07 PROCEDURE — 81025 POCT URINE PREGNANCY: ICD-10-PCS | Mod: S$GLB,,, | Performed by: OBSTETRICS & GYNECOLOGY

## 2020-01-07 PROCEDURE — 99214 OFFICE O/P EST MOD 30 MIN: CPT | Mod: S$GLB,,, | Performed by: OBSTETRICS & GYNECOLOGY

## 2020-01-07 PROCEDURE — 99214 PR OFFICE/OUTPT VISIT, EST, LEVL IV, 30-39 MIN: ICD-10-PCS | Mod: S$GLB,,, | Performed by: OBSTETRICS & GYNECOLOGY

## 2020-01-07 PROCEDURE — 81025 URINE PREGNANCY TEST: CPT | Mod: S$GLB,,, | Performed by: OBSTETRICS & GYNECOLOGY

## 2020-01-07 PROCEDURE — 99999 PR PBB SHADOW E&M-EST. PATIENT-LVL III: CPT | Mod: PBBFAC,,, | Performed by: OBSTETRICS & GYNECOLOGY

## 2020-01-07 PROCEDURE — 3008F BODY MASS INDEX DOCD: CPT | Mod: CPTII,S$GLB,, | Performed by: OBSTETRICS & GYNECOLOGY

## 2020-01-07 RX ORDER — ONDANSETRON 4 MG/1
4 TABLET, ORALLY DISINTEGRATING ORAL EVERY 6 HOURS PRN
Qty: 30 TABLET | Refills: 1 | Status: SHIPPED | OUTPATIENT
Start: 2020-01-07 | End: 2020-09-18

## 2020-01-07 NOTE — PROGRESS NOTES
[facility]  Ambulatory Clinic  Obstetrics & Gynecology    Visit Date:  2020     Chief Complaint:  Missed my period    History of Present Illness:      Talia Marin is a 24 y.o. , UPT positive today, here with c/o missed period.    Patient's last menstrual period was 2019.    Pt c/o nausea and requesting zofran.      Pt denies any vaginal bleeding, discharge, pain, GI/ compliants.      Her previous pregnancies were fairly uneventful and she delivered vaginal at term without complications per pt.    Pt reports overall good health.    Significant other present for visit.    Past Medical History:      None      Past Surgical History:     H/o D&C for missed/spontaneous  with blood transfusion    Medications:      Prenatal vitamins     Allergies:      NKDA      Obstetric History:       Para Term  AB Living   5 3 3 0 1 3   SAB TAB Ectopic Multiple Live Births   1 0 0 0 3      # Outcome Date GA Lbr Brandan/2nd Weight Sex Delivery Anes PTL Lv   4 Term 19 39w3d  3.685 kg (8 lb 2 oz) M Vag-Spont EPI N ALEE      Name: ETTA MARIN      Apgar1: 9  Apgar5: 9   3 Term 06/08/15   2.722 kg (6 lb) F Vag-Spont  N ALEE      Name: Nidhi   2 Term 12   3.175 kg (7 lb) M Vag-Spont  N ALEE      Name: Rickey   1 SAB               Obstetric Comments   G2- IUFD of twins at 17 wks     Gynecologic History:      Denies recent/active STI  Denies history abnormal Pap, last pap 2017 at [facility]     Social History:      Denies tobacco, alcohol or illicit drug use  Current partner is father of baby  Denies domestic abuse     Family History:       Denies congenital anomalies, inherited syndromes, fetal aneuploidy    Review of Systems:      Constitutional:  No fever, fatigue  HENT:  No congestion, hearing changes  Eyes:  No visual disturbance  Respiratory:  No cough, shortness of breath  Cardiovascular:  No chest pain, leg swelling  Breast:  No lump, pain, nipple  "discharge, redness, skin changes  Gastrointestinal:  No abdominal pain, constipation, blood in stool   Genitourinary:  No dysuria, frequency  Endocrine:  No heat or cold intolerance  Musculoskeletal:  No back pain, arthralgias  Skin:  No rash, jaundice  Neurological:  No dizziness, weakness, headaches  Psychiatric/Behavioral:  No sleep disturbance, dysphoric mood     Physical Exam:     /74 (BP Location: Right arm)   Ht 5' 3" (1.6 m)   Wt 80.1 kg (176 lb 9.4 oz)   LMP 2019   BMI 31.28 kg/m²      GENERAL:  NAD. Well-nourished. A&Ox3.  HEENT:  NCAT, EOMI, moist mucus membranes.  Neck supple w/o masses.  BREAST:  Symmetric, no obvious masses, adenopathy, skin changes or nipple discharge.  LUNGS:  CTA-B.  HEART:  RRR, physiologic heart sounds.  ABDOMEN:  Soft, non-tender. Normoactive BS.  No obvious organomegaly.    EXT:  Symmetric w/o cramping, claudication, or edema. +2 distal pulses. FROM.  SKIN:  No rashes  NEURO:  CN II - XII grossly intact bilaterally. +2 DTR.  PSYCH:  Mood & affect appropriate.       PELVIC:  Female external genitalia w/o any obvious lesions.  Adequate perineal body. Normal urethral meatus. No gross lymphadenopathy.    Vagina:  Pink, moist, well-rugated.  Vaginal vault with good support.  No obvious lesion.  No discharge noted.     Cervix:  No cervical motion tenderness, discharge, or obvious lesions.  Closed, thick, posterior.  Uterus:  Small, non-tender, normal contour.  Adnexa:  No masses or tenderness.    Rectal:  Declined.  No obvious external lesions.   Wet prep:  Negative    Chaperone present for exam.    Assessment:     1. 24 y.o. , UPT positive, LMP 2019, here for confirmation of pregnancy  2. Nausea    Plan:    We discussed principles of prenatal care, weight gain goals, dietary/lifestyle modifications, pregnancy care instructions and precautions.  Prenatal educational material given to pt.  Continue prenatal vitamins.  SAB and ectopic precautions reviewed.  "     Pt requesting refill of zofran to use prn for nausea/vomiting. Risks, benefits, and alternatives to zofran reviewed. Dietary advice. Cautioned on drossiness, no driving or operating machinery.    Return in 4 weeks for OB visit, or sooner prn.  All questions answered, pt voiced understanding.      George Robertson MD

## 2020-01-21 ENCOUNTER — PROCEDURE VISIT (OUTPATIENT)
Dept: MATERNAL FETAL MEDICINE | Facility: CLINIC | Age: 25
End: 2020-01-21
Payer: COMMERCIAL

## 2020-01-21 DIAGNOSIS — Z36.89 ENCOUNTER TO ESTABLISH GESTATIONAL AGE USING ULTRASOUND: ICD-10-CM

## 2020-01-21 PROCEDURE — 76817 PR US, OB, TRANSVAG APPROACH: ICD-10-PCS | Mod: S$GLB,,, | Performed by: OBSTETRICS & GYNECOLOGY

## 2020-01-21 PROCEDURE — 76817 TRANSVAGINAL US OBSTETRIC: CPT | Mod: S$GLB,,, | Performed by: OBSTETRICS & GYNECOLOGY

## 2020-02-03 ENCOUNTER — LAB VISIT (OUTPATIENT)
Dept: LAB | Facility: HOSPITAL | Age: 25
End: 2020-02-03
Attending: OBSTETRICS & GYNECOLOGY
Payer: COMMERCIAL

## 2020-02-03 ENCOUNTER — INITIAL PRENATAL (OUTPATIENT)
Dept: OBSTETRICS AND GYNECOLOGY | Facility: CLINIC | Age: 25
End: 2020-02-03
Payer: COMMERCIAL

## 2020-02-03 VITALS
HEART RATE: 72 BPM | BODY MASS INDEX: 31.79 KG/M2 | DIASTOLIC BLOOD PRESSURE: 68 MMHG | WEIGHT: 179.44 LBS | SYSTOLIC BLOOD PRESSURE: 112 MMHG

## 2020-02-03 DIAGNOSIS — Z3A.10 10 WEEKS GESTATION OF PREGNANCY: Primary | ICD-10-CM

## 2020-02-03 DIAGNOSIS — Z3A.10 10 WEEKS GESTATION OF PREGNANCY: ICD-10-CM

## 2020-02-03 LAB
ABO + RH BLD: NORMAL
ALBUMIN SERPL BCP-MCNC: 3.4 G/DL (ref 3.5–5.2)
ALP SERPL-CCNC: 52 U/L (ref 55–135)
ALT SERPL W/O P-5'-P-CCNC: 8 U/L (ref 10–44)
ANION GAP SERPL CALC-SCNC: 6 MMOL/L (ref 8–16)
AST SERPL-CCNC: 11 U/L (ref 10–40)
BASOPHILS # BLD AUTO: 0.03 K/UL (ref 0–0.2)
BASOPHILS NFR BLD: 0.4 % (ref 0–1.9)
BILIRUB SERPL-MCNC: 0.3 MG/DL (ref 0.1–1)
BLD GP AB SCN CELLS X3 SERPL QL: NORMAL
BUN SERPL-MCNC: 6 MG/DL (ref 6–20)
CALCIUM SERPL-MCNC: 9 MG/DL (ref 8.7–10.5)
CHLORIDE SERPL-SCNC: 104 MMOL/L (ref 95–110)
CO2 SERPL-SCNC: 24 MMOL/L (ref 23–29)
CREAT SERPL-MCNC: 0.6 MG/DL (ref 0.5–1.4)
DIFFERENTIAL METHOD: ABNORMAL
EOSINOPHIL # BLD AUTO: 0.1 K/UL (ref 0–0.5)
EOSINOPHIL NFR BLD: 0.7 % (ref 0–8)
ERYTHROCYTE [DISTWIDTH] IN BLOOD BY AUTOMATED COUNT: 12.5 % (ref 11.5–14.5)
EST. GFR  (AFRICAN AMERICAN): >60 ML/MIN/1.73 M^2
EST. GFR  (NON AFRICAN AMERICAN): >60 ML/MIN/1.73 M^2
ESTIMATED AVG GLUCOSE: 103 MG/DL (ref 68–131)
GLUCOSE SERPL-MCNC: 112 MG/DL (ref 70–110)
HBA1C MFR BLD HPLC: 5.2 % (ref 4–5.6)
HCT VFR BLD AUTO: 36.4 % (ref 37–48.5)
HGB BLD-MCNC: 11.8 G/DL (ref 12–16)
IMM GRANULOCYTES # BLD AUTO: 0.02 K/UL (ref 0–0.04)
IMM GRANULOCYTES NFR BLD AUTO: 0.3 % (ref 0–0.5)
LYMPHOCYTES # BLD AUTO: 2.4 K/UL (ref 1–4.8)
LYMPHOCYTES NFR BLD: 32.4 % (ref 18–48)
MCH RBC QN AUTO: 28.6 PG (ref 27–31)
MCHC RBC AUTO-ENTMCNC: 32.4 G/DL (ref 32–36)
MCV RBC AUTO: 88 FL (ref 82–98)
MONOCYTES # BLD AUTO: 0.4 K/UL (ref 0.3–1)
MONOCYTES NFR BLD: 5.1 % (ref 4–15)
NEUTROPHILS # BLD AUTO: 4.5 K/UL (ref 1.8–7.7)
NEUTROPHILS NFR BLD: 61.1 % (ref 38–73)
NRBC BLD-RTO: 0 /100 WBC
PLATELET # BLD AUTO: 331 K/UL (ref 150–350)
PMV BLD AUTO: 9 FL (ref 9.2–12.9)
POTASSIUM SERPL-SCNC: 3.8 MMOL/L (ref 3.5–5.1)
PROT SERPL-MCNC: 7 G/DL (ref 6–8.4)
RBC # BLD AUTO: 4.12 M/UL (ref 4–5.4)
SODIUM SERPL-SCNC: 134 MMOL/L (ref 136–145)
WBC # BLD AUTO: 7.4 K/UL (ref 3.9–12.7)

## 2020-02-03 PROCEDURE — 36415 COLL VENOUS BLD VENIPUNCTURE: CPT

## 2020-02-03 PROCEDURE — 86703 HIV-1/HIV-2 1 RESULT ANTBDY: CPT

## 2020-02-03 PROCEDURE — 87077 CULTURE AEROBIC IDENTIFY: CPT

## 2020-02-03 PROCEDURE — 86901 BLOOD TYPING SEROLOGIC RH(D): CPT

## 2020-02-03 PROCEDURE — 87186 SC STD MICRODIL/AGAR DIL: CPT

## 2020-02-03 PROCEDURE — 87088 URINE BACTERIA CULTURE: CPT

## 2020-02-03 PROCEDURE — 85025 COMPLETE CBC W/AUTO DIFF WBC: CPT

## 2020-02-03 PROCEDURE — 86762 RUBELLA ANTIBODY: CPT

## 2020-02-03 PROCEDURE — 87086 URINE CULTURE/COLONY COUNT: CPT

## 2020-02-03 PROCEDURE — 83036 HEMOGLOBIN GLYCOSYLATED A1C: CPT

## 2020-02-03 PROCEDURE — 0500F INITIAL PRENATAL CARE VISIT: CPT | Mod: S$GLB,,, | Performed by: OBSTETRICS & GYNECOLOGY

## 2020-02-03 PROCEDURE — 99999 PR PBB SHADOW E&M-EST. PATIENT-LVL III: CPT | Mod: PBBFAC,,, | Performed by: OBSTETRICS & GYNECOLOGY

## 2020-02-03 PROCEDURE — 99999 PR PBB SHADOW E&M-EST. PATIENT-LVL III: ICD-10-PCS | Mod: PBBFAC,,, | Performed by: OBSTETRICS & GYNECOLOGY

## 2020-02-03 PROCEDURE — 87340 HEPATITIS B SURFACE AG IA: CPT

## 2020-02-03 PROCEDURE — 80053 COMPREHEN METABOLIC PANEL: CPT

## 2020-02-03 PROCEDURE — 86803 HEPATITIS C AB TEST: CPT

## 2020-02-03 PROCEDURE — 86592 SYPHILIS TEST NON-TREP QUAL: CPT

## 2020-02-03 PROCEDURE — 0500F PR INITIAL PRENATAL CARE VISIT: ICD-10-PCS | Mod: S$GLB,,, | Performed by: OBSTETRICS & GYNECOLOGY

## 2020-02-04 LAB
HBV SURFACE AG SERPL QL IA: NEGATIVE
HCV AB SERPL QL IA: NEGATIVE
HIV 1+2 AB+HIV1 P24 AG SERPL QL IA: NEGATIVE
RPR SER QL: NORMAL
RUBV IGG SER-ACNC: <5 IU/ML
RUBV IGG SER-IMP: ABNORMAL

## 2020-02-04 NOTE — PROGRESS NOTES
[facility]  Ambulatory Clinic  Obstetrics & Gynecology    Visit Date:  2020     Chief Complaint:  Missed my period    History of Present Illness:      Talia Marin is a 24 y.o. , UPT positive today, here with c/o missed period.    Patient's last menstrual period was 2019.    Pt c/o nausea and requesting zofran.      Pt denies any vaginal bleeding, discharge, pain, GI/ compliants.      Her previous pregnancies were fairly uneventful and she delivered vaginal at term without complications per pt.    Pt reports overall good health.    Significant other present for visit.    Past Medical History:      None      Past Surgical History:     H/o D&C for missed/spontaneous  with blood transfusion    Medications:      Prenatal vitamins     Allergies:      NKDA      Obstetric History:       Para Term  AB Living   5 3 3 0 1 3   SAB TAB Ectopic Multiple Live Births   1 0 0 0 3      # Outcome Date GA Lbr Brandan/2nd Weight Sex Delivery Anes PTL Lv   4 Term 19 39w3d  3.685 kg (8 lb 2 oz) M Vag-Spont EPI N ALEE      Name: ETTA MARIN      Apgar1: 9  Apgar5: 9   3 Term 06/08/15   2.722 kg (6 lb) F Vag-Spont  N ALEE      Name: Nidhi   2 Term 12   3.175 kg (7 lb) M Vag-Spont  N ALEE      Name: Rickey   1 SAB               Obstetric Comments   G2- IUFD of twins at 17 wks     Gynecologic History:      Denies recent/active STI  Denies history abnormal Pap, last pap 2017 at [facility]     Social History:      Denies tobacco, alcohol or illicit drug use  Current partner is father of baby  Denies domestic abuse     Family History:       Denies congenital anomalies, inherited syndromes, fetal aneuploidy    Review of Systems:      Constitutional:  No fever, fatigue  HENT:  No congestion, hearing changes  Eyes:  No visual disturbance  Respiratory:  No cough, shortness of breath  Cardiovascular:  No chest pain, leg swelling  Breast:  No lump, pain, nipple  "discharge, redness, skin changes  Gastrointestinal:  No abdominal pain, constipation, blood in stool   Genitourinary:  No dysuria, frequency  Endocrine:  No heat or cold intolerance  Musculoskeletal:  No back pain, arthralgias  Skin:  No rash, jaundice  Neurological:  No dizziness, weakness, headaches  Psychiatric/Behavioral:  No sleep disturbance, dysphoric mood     Physical Exam:     /74 (BP Location: Right arm)   Ht 5' 3" (1.6 m)   Wt 80.1 kg (176 lb 9.4 oz)   LMP 2019   BMI 31.28 kg/m²      GENERAL:  NAD. Well-nourished. A&Ox3.  HEENT:  NCAT, EOMI, moist mucus membranes.  Neck supple w/o masses.  BREAST:  Symmetric, no obvious masses, adenopathy, skin changes or nipple discharge.  LUNGS:  CTA-B.  HEART:  RRR, physiologic heart sounds.  ABDOMEN:  Soft, non-tender. Normoactive BS.  No obvious organomegaly.    EXT:  Symmetric w/o cramping, claudication, or edema. +2 distal pulses. FROM.  SKIN:  No rashes  NEURO:  CN II - XII grossly intact bilaterally. +2 DTR.  PSYCH:  Mood & affect appropriate.       PELVIC:  Female external genitalia w/o any obvious lesions.  Adequate perineal body. Normal urethral meatus. No gross lymphadenopathy.    Vagina:  Pink, moist, well-rugated.  Vaginal vault with good support.  No obvious lesion.  No discharge noted.     Cervix:  No cervical motion tenderness, discharge, or obvious lesions.  Closed, thick, posterior.  Uterus:  Small, non-tender, normal contour.  Adnexa:  No masses or tenderness.    Rectal:  Declined.  No obvious external lesions.   Wet prep:  Negative    Chaperone present for exam.    Assessment:     1. 24 y.o. , UPT positive, LMP 2019, here for confirmation of pregnancy  2. Nausea    Plan:    We discussed principles of prenatal care, weight gain goals, dietary/lifestyle modifications, pregnancy care instructions and precautions.  Prenatal educational material given to pt.  Continue prenatal vitamins.  SAB and ectopic precautions reviewed.  "     Pt requesting refill of zofran to use prn for nausea/vomiting. Risks, benefits, and alternatives to zofran reviewed. Dietary advice. Cautioned on drossiness, no driving or operating machinery.    Return in 4 weeks for OB visit, or sooner prn.  All questions answered, pt voiced understanding.      George Robertson MD    _____________________________________________________________________    2/3/2020    10w6d here for initial OB visit.    Pt has no major complaints today.  Denies vaginal bleeding, leakage of fluid, or contractions.    Order initial OB labs.  Pt declined first trimester aneuploidy screening, and state she would not terminate the pregnancy for any reasons.  Refer to [facility] for anatomy US as pregnancy progresses.   Discussed daily low dose ASA ~14 wks for prevention of preE, IUGR, and stillbirths due to maternal risk factors.    Principles of prenatal care and precautions reviewed.  Return 4 wks or sooner prn.  Voiced understanding.  Significant other present for visit.    George Robertson MD  _____________________________________________________________________

## 2020-02-05 LAB — BACTERIA UR CULT: ABNORMAL

## 2020-02-06 ENCOUNTER — PATIENT MESSAGE (OUTPATIENT)
Dept: OBSTETRICS AND GYNECOLOGY | Facility: CLINIC | Age: 25
End: 2020-02-06

## 2020-02-06 DIAGNOSIS — R82.71 ASYMPTOMATIC BACTERIURIA DURING PREGNANCY: Primary | ICD-10-CM

## 2020-02-06 DIAGNOSIS — O99.891 ASYMPTOMATIC BACTERIURIA DURING PREGNANCY: Primary | ICD-10-CM

## 2020-02-06 RX ORDER — NITROFURANTOIN 25; 75 MG/1; MG/1
100 CAPSULE ORAL 2 TIMES DAILY
Qty: 14 CAPSULE | Refills: 0 | Status: SHIPPED | OUTPATIENT
Start: 2020-02-06 | End: 2020-02-13

## 2020-02-11 DIAGNOSIS — Z36.89 ENCOUNTER FOR FETAL ANATOMIC SURVEY: Primary | ICD-10-CM

## 2020-03-03 ENCOUNTER — LAB VISIT (OUTPATIENT)
Dept: LAB | Facility: HOSPITAL | Age: 25
End: 2020-03-03
Attending: OBSTETRICS & GYNECOLOGY
Payer: COMMERCIAL

## 2020-03-03 ENCOUNTER — ROUTINE PRENATAL (OUTPATIENT)
Dept: OBSTETRICS AND GYNECOLOGY | Facility: CLINIC | Age: 25
End: 2020-03-03
Payer: COMMERCIAL

## 2020-03-03 VITALS — DIASTOLIC BLOOD PRESSURE: 70 MMHG | BODY MASS INDEX: 31.2 KG/M2 | WEIGHT: 176.13 LBS | SYSTOLIC BLOOD PRESSURE: 115 MMHG

## 2020-03-03 DIAGNOSIS — Z3A.15 15 WEEKS GESTATION OF PREGNANCY: ICD-10-CM

## 2020-03-03 DIAGNOSIS — Z3A.15 15 WEEKS GESTATION OF PREGNANCY: Primary | ICD-10-CM

## 2020-03-03 PROCEDURE — 81511 FTL CGEN ABNOR FOUR ANAL: CPT

## 2020-03-03 PROCEDURE — 0502F SUBSEQUENT PRENATAL CARE: CPT | Mod: CPTII,S$GLB,, | Performed by: OBSTETRICS & GYNECOLOGY

## 2020-03-03 PROCEDURE — 87491 CHLMYD TRACH DNA AMP PROBE: CPT

## 2020-03-03 PROCEDURE — 99999 PR PBB SHADOW E&M-EST. PATIENT-LVL II: CPT | Mod: PBBFAC,,, | Performed by: OBSTETRICS & GYNECOLOGY

## 2020-03-03 PROCEDURE — 0502F PR SUBSEQUENT PRENATAL CARE: ICD-10-PCS | Mod: CPTII,S$GLB,, | Performed by: OBSTETRICS & GYNECOLOGY

## 2020-03-03 PROCEDURE — 99999 PR PBB SHADOW E&M-EST. PATIENT-LVL II: ICD-10-PCS | Mod: PBBFAC,,, | Performed by: OBSTETRICS & GYNECOLOGY

## 2020-03-03 PROCEDURE — 36415 COLL VENOUS BLD VENIPUNCTURE: CPT

## 2020-03-03 NOTE — PROGRESS NOTES
15w0d here for initial OB visit.     Pt has no major complaints today.  Reports active fetus.  Denies vaginal bleeding, leakage of fluid, or contractions.    Pt completing antibx for asymptomatic bacteriuria.  Denies UTI sxs.  Repeat urine cx next visit.  UTI precautions.    Initial OB lab results d/w pt.  Order quad screen today.  Refer to [facility] for anatomy ultrasound.  Discussed daily low dose ASA ~14 wks for prevention of preE, IUGR, and stillbirths due to maternal risk factors.    Precautions reviewed.  Return 4 wks or sooner prn.  Voiced understanding.  Significant other present for visit.       George Robertson MD

## 2020-03-04 LAB
# FETUSES US: NORMAL
2ND TRIMESTER 4 SCREEN PNL SERPL: NEGATIVE
2ND TRIMESTER 4 SCREEN SERPL-IMP: NORMAL
AFP MOM SERPL: 1.13
AFP SERPL-MCNC: 22.2 NG/ML
AGE AT DELIVERY: 23
B-HCG MOM SERPL: 1.1
B-HCG SERPL-ACNC: 47.1 IU/ML
C TRACH DNA SPEC QL NAA+PROBE: NOT DETECTED
FET TS 21 RISK FROM MAT AGE: NORMAL
GA (DAYS): 0 D
GA (WEEKS): 15 WK
GA METHOD: NORMAL
IDDM PATIENT QL: NORMAL
INHIBIN A MOM SERPL: 1.03
INHIBIN A SERPL-MCNC: 170.5 PG/ML
N GONORRHOEA DNA SPEC QL NAA+PROBE: NOT DETECTED
SMOKING STATUS FTND: NORMAL
TS 18 RISK FETUS: NORMAL
TS 21 RISK FETUS: NORMAL
U ESTRIOL MOM SERPL: 1.33
U ESTRIOL SERPL-MCNC: 0.75 NG/ML

## 2020-03-31 ENCOUNTER — ROUTINE PRENATAL (OUTPATIENT)
Dept: OBSTETRICS AND GYNECOLOGY | Facility: CLINIC | Age: 25
End: 2020-03-31
Payer: COMMERCIAL

## 2020-03-31 VITALS — SYSTOLIC BLOOD PRESSURE: 96 MMHG | DIASTOLIC BLOOD PRESSURE: 56 MMHG | BODY MASS INDEX: 31.55 KG/M2 | WEIGHT: 178.13 LBS

## 2020-03-31 DIAGNOSIS — Z3A.19 19 WEEKS GESTATION OF PREGNANCY: Primary | ICD-10-CM

## 2020-03-31 PROCEDURE — 87088 URINE BACTERIA CULTURE: CPT

## 2020-03-31 PROCEDURE — 99999 PR PBB SHADOW E&M-EST. PATIENT-LVL II: ICD-10-PCS | Mod: PBBFAC,,, | Performed by: OBSTETRICS & GYNECOLOGY

## 2020-03-31 PROCEDURE — 0502F SUBSEQUENT PRENATAL CARE: CPT | Mod: CPTII,S$GLB,, | Performed by: OBSTETRICS & GYNECOLOGY

## 2020-03-31 PROCEDURE — 0502F PR SUBSEQUENT PRENATAL CARE: ICD-10-PCS | Mod: CPTII,S$GLB,, | Performed by: OBSTETRICS & GYNECOLOGY

## 2020-03-31 PROCEDURE — 99999 PR PBB SHADOW E&M-EST. PATIENT-LVL II: CPT | Mod: PBBFAC,,, | Performed by: OBSTETRICS & GYNECOLOGY

## 2020-03-31 PROCEDURE — 87077 CULTURE AEROBIC IDENTIFY: CPT

## 2020-03-31 PROCEDURE — 87186 SC STD MICRODIL/AGAR DIL: CPT

## 2020-03-31 PROCEDURE — 87086 URINE CULTURE/COLONY COUNT: CPT

## 2020-03-31 NOTE — PROGRESS NOTES
19w0d here for OB visit.     No major complaints today.  Reports active fetus.  Denies vaginal bleeding, leakage of fluid, or contractions.    Pt taking antibx for asymptomatic bacteriuria.  Denies UTI sxs.  Repeat urine cx today.  UTI precautions.    Quad screen negative.  Refer to [facility] for anatomy ultrasound.  Encourage daily low dose ASA for prevention of preE, IUGR, and stillbirths due to maternal risk factors.    Discussed [employer] [facility] program.  Pt interested.  Advised to complete registration online.  Risks, benefits, and alternatives to program discussed.    COVID 19 precautions.    Precautions reviewed.  Return 4 wks or sooner prn.  Voiced understanding.       George Robertson MD

## 2020-04-02 DIAGNOSIS — R82.71 ASYMPTOMATIC BACTERIURIA: Primary | ICD-10-CM

## 2020-04-02 LAB — BACTERIA UR CULT: ABNORMAL

## 2020-04-02 RX ORDER — NITROFURANTOIN 25; 75 MG/1; MG/1
100 CAPSULE ORAL 2 TIMES DAILY
Qty: 14 CAPSULE | Refills: 0 | Status: SHIPPED | OUTPATIENT
Start: 2020-04-02 | End: 2020-04-09

## 2020-04-20 ENCOUNTER — PROCEDURE VISIT (OUTPATIENT)
Dept: MATERNAL FETAL MEDICINE | Facility: CLINIC | Age: 25
End: 2020-04-20
Payer: COMMERCIAL

## 2020-04-20 DIAGNOSIS — Z36.89 ENCOUNTER FOR FETAL ANATOMIC SURVEY: ICD-10-CM

## 2020-04-20 DIAGNOSIS — Z36.89 ENCOUNTER FOR ULTRASOUND TO CHECK FETAL GROWTH: Primary | ICD-10-CM

## 2020-04-20 PROCEDURE — 76805 OB US >/= 14 WKS SNGL FETUS: CPT | Mod: S$GLB,,, | Performed by: OBSTETRICS & GYNECOLOGY

## 2020-04-20 PROCEDURE — 76805 PR US, OB 14+WKS, TRANSABD, SINGLE GESTATION: ICD-10-PCS | Mod: S$GLB,,, | Performed by: OBSTETRICS & GYNECOLOGY

## 2020-05-11 ENCOUNTER — ROUTINE PRENATAL (OUTPATIENT)
Dept: OBSTETRICS AND GYNECOLOGY | Facility: CLINIC | Age: 25
End: 2020-05-11
Payer: COMMERCIAL

## 2020-05-11 ENCOUNTER — PATIENT MESSAGE (OUTPATIENT)
Dept: ADMINISTRATIVE | Facility: OTHER | Age: 25
End: 2020-05-11

## 2020-05-11 VITALS
DIASTOLIC BLOOD PRESSURE: 54 MMHG | WEIGHT: 185.44 LBS | SYSTOLIC BLOOD PRESSURE: 144 MMHG | BODY MASS INDEX: 32.84 KG/M2

## 2020-05-11 DIAGNOSIS — K59.00 CONSTIPATION, UNSPECIFIED CONSTIPATION TYPE: ICD-10-CM

## 2020-05-11 DIAGNOSIS — O99.891 ASYMPTOMATIC BACTERIURIA DURING PREGNANCY: ICD-10-CM

## 2020-05-11 DIAGNOSIS — Z3A.24 24 WEEKS GESTATION OF PREGNANCY: Primary | ICD-10-CM

## 2020-05-11 DIAGNOSIS — R82.71 ASYMPTOMATIC BACTERIURIA DURING PREGNANCY: ICD-10-CM

## 2020-05-11 LAB
CREAT UR-MCNC: 85 MG/DL (ref 15–325)
PROT UR-MCNC: 7 MG/DL
PROT/CREAT UR: 0.08 MG/G{CREAT} (ref 0–0.2)

## 2020-05-11 PROCEDURE — 0502F SUBSEQUENT PRENATAL CARE: CPT | Mod: CPTII,S$GLB,, | Performed by: OBSTETRICS & GYNECOLOGY

## 2020-05-11 PROCEDURE — 99999 PR PBB SHADOW E&M-EST. PATIENT-LVL II: ICD-10-PCS | Mod: PBBFAC,,, | Performed by: OBSTETRICS & GYNECOLOGY

## 2020-05-11 PROCEDURE — 82570 ASSAY OF URINE CREATININE: CPT

## 2020-05-11 PROCEDURE — 0502F PR SUBSEQUENT PRENATAL CARE: ICD-10-PCS | Mod: CPTII,S$GLB,, | Performed by: OBSTETRICS & GYNECOLOGY

## 2020-05-11 PROCEDURE — 99999 PR PBB SHADOW E&M-EST. PATIENT-LVL II: CPT | Mod: PBBFAC,,, | Performed by: OBSTETRICS & GYNECOLOGY

## 2020-05-11 RX ORDER — DOCUSATE SODIUM 100 MG/1
100 CAPSULE, LIQUID FILLED ORAL 2 TIMES DAILY PRN
Qty: 60 CAPSULE | Refills: 1 | Status: SHIPPED | OUTPATIENT
Start: 2020-05-11 | End: 2020-06-17 | Stop reason: SDUPTHER

## 2020-05-11 NOTE — PROGRESS NOTES
24w6d here for OB visit.     Pt has no major complaints today.  Reports active fetus.  Denies vaginal bleeding, leakage of fluid, or contractions.    BP elevated on arrival 144/54, pt state she was rushing to clinic.  Repeat /62 at 15 mins rest, P64.  Pt does not appear pre-eclamptic.  Denies headaches, vision changes, epigastric pain, or acute swelling.  Order baseline preE labs.  Pt advised to start home BP monitoring TID and call office if BP > 140/90 at rest.  Encourage participation with [facility] program for BP monitoring.  PreE precautions.  Encourage daily low dose ASA for prevention of preE, IUGR, and stillbirths due to maternal risk factors.    Pt states she just started antibx for asymptomatic bacteriuria yesterday.  Denies UTI sxs.  UTI precautions.    Pt seen [facility] for anatomy US on 4/20.    Impression  =========  Izquierdo live intrauterine pregnancy.  Biometry agrees with the clinical dating.  Normal amniotic fluid volume by qualitative assessment.  The visualized fetal anatomy appears normal.  Placenta is anterior without previa. Placenta is likely circumvallate vs uterine synechiae independent of placenta.  Transabdominal cervical length is normal.    Recommendation  ==============  Follow up ultrasound at approximately 32 weeks due to uterine synechiae vs circumvallate placenta.    Quad screen negative.  Placental finding d/w pt.    COVID 19 precautions.    Labor/preE precautions.  Kick counts.  Return 4 wks or sooner prn.  Voiced understanding.       George Robertson MD

## 2020-05-14 ENCOUNTER — LAB VISIT (OUTPATIENT)
Dept: LAB | Facility: HOSPITAL | Age: 25
End: 2020-05-14
Attending: OBSTETRICS & GYNECOLOGY
Payer: COMMERCIAL

## 2020-05-14 DIAGNOSIS — Z3A.24 24 WEEKS GESTATION OF PREGNANCY: ICD-10-CM

## 2020-05-14 LAB
ALBUMIN SERPL BCP-MCNC: 2.9 G/DL (ref 3.5–5.2)
ALP SERPL-CCNC: 66 U/L (ref 55–135)
ALT SERPL W/O P-5'-P-CCNC: 7 U/L (ref 10–44)
ANION GAP SERPL CALC-SCNC: 8 MMOL/L (ref 8–16)
AST SERPL-CCNC: 11 U/L (ref 10–40)
BASOPHILS # BLD AUTO: 0.02 K/UL (ref 0–0.2)
BASOPHILS NFR BLD: 0.2 % (ref 0–1.9)
BILIRUB SERPL-MCNC: 0.3 MG/DL (ref 0.1–1)
BUN SERPL-MCNC: 6 MG/DL (ref 6–20)
CALCIUM SERPL-MCNC: 8.4 MG/DL (ref 8.7–10.5)
CHLORIDE SERPL-SCNC: 108 MMOL/L (ref 95–110)
CO2 SERPL-SCNC: 22 MMOL/L (ref 23–29)
CREAT SERPL-MCNC: 0.6 MG/DL (ref 0.5–1.4)
DIFFERENTIAL METHOD: ABNORMAL
EOSINOPHIL # BLD AUTO: 0.1 K/UL (ref 0–0.5)
EOSINOPHIL NFR BLD: 0.7 % (ref 0–8)
ERYTHROCYTE [DISTWIDTH] IN BLOOD BY AUTOMATED COUNT: 12.9 % (ref 11.5–14.5)
EST. GFR  (AFRICAN AMERICAN): >60 ML/MIN/1.73 M^2
EST. GFR  (NON AFRICAN AMERICAN): >60 ML/MIN/1.73 M^2
GLUCOSE SERPL-MCNC: 105 MG/DL (ref 70–140)
GLUCOSE SERPL-MCNC: 87 MG/DL (ref 70–110)
HCT VFR BLD AUTO: 33.8 % (ref 37–48.5)
HGB BLD-MCNC: 11 G/DL (ref 12–16)
IMM GRANULOCYTES # BLD AUTO: 0.07 K/UL (ref 0–0.04)
IMM GRANULOCYTES NFR BLD AUTO: 0.9 % (ref 0–0.5)
LDH SERPL L TO P-CCNC: 139 U/L (ref 110–260)
LYMPHOCYTES # BLD AUTO: 2.5 K/UL (ref 1–4.8)
LYMPHOCYTES NFR BLD: 30.8 % (ref 18–48)
MCH RBC QN AUTO: 28.9 PG (ref 27–31)
MCHC RBC AUTO-ENTMCNC: 32.5 G/DL (ref 32–36)
MCV RBC AUTO: 89 FL (ref 82–98)
MONOCYTES # BLD AUTO: 0.6 K/UL (ref 0.3–1)
MONOCYTES NFR BLD: 6.8 % (ref 4–15)
NEUTROPHILS # BLD AUTO: 5 K/UL (ref 1.8–7.7)
NEUTROPHILS NFR BLD: 60.6 % (ref 38–73)
NRBC BLD-RTO: 0 /100 WBC
PLATELET # BLD AUTO: 310 K/UL (ref 150–350)
PMV BLD AUTO: 9.2 FL (ref 9.2–12.9)
POTASSIUM SERPL-SCNC: 3.8 MMOL/L (ref 3.5–5.1)
PROT SERPL-MCNC: 6.8 G/DL (ref 6–8.4)
RBC # BLD AUTO: 3.8 M/UL (ref 4–5.4)
SODIUM SERPL-SCNC: 138 MMOL/L (ref 136–145)
URATE SERPL-MCNC: 4.4 MG/DL (ref 2.4–5.7)
WBC # BLD AUTO: 8.22 K/UL (ref 3.9–12.7)

## 2020-05-14 PROCEDURE — 80053 COMPREHEN METABOLIC PANEL: CPT

## 2020-05-14 PROCEDURE — 82950 GLUCOSE TEST: CPT

## 2020-05-14 PROCEDURE — 36415 COLL VENOUS BLD VENIPUNCTURE: CPT

## 2020-05-14 PROCEDURE — 83615 LACTATE (LD) (LDH) ENZYME: CPT

## 2020-05-14 PROCEDURE — 85025 COMPLETE CBC W/AUTO DIFF WBC: CPT

## 2020-05-14 PROCEDURE — 84550 ASSAY OF BLOOD/URIC ACID: CPT

## 2020-06-17 ENCOUNTER — ROUTINE PRENATAL (OUTPATIENT)
Dept: OBSTETRICS AND GYNECOLOGY | Facility: CLINIC | Age: 25
End: 2020-06-17
Payer: COMMERCIAL

## 2020-06-17 VITALS
BODY MASS INDEX: 33.16 KG/M2 | DIASTOLIC BLOOD PRESSURE: 54 MMHG | WEIGHT: 187.19 LBS | SYSTOLIC BLOOD PRESSURE: 110 MMHG

## 2020-06-17 DIAGNOSIS — Z3A.30 30 WEEKS GESTATION OF PREGNANCY: Primary | ICD-10-CM

## 2020-06-17 PROCEDURE — 99999 PR PBB SHADOW E&M-EST. PATIENT-LVL II: ICD-10-PCS | Mod: PBBFAC,,, | Performed by: OBSTETRICS & GYNECOLOGY

## 2020-06-17 PROCEDURE — 0502F SUBSEQUENT PRENATAL CARE: CPT | Mod: CPTII,S$GLB,, | Performed by: OBSTETRICS & GYNECOLOGY

## 2020-06-17 PROCEDURE — 0502F PR SUBSEQUENT PRENATAL CARE: ICD-10-PCS | Mod: CPTII,S$GLB,, | Performed by: OBSTETRICS & GYNECOLOGY

## 2020-06-17 PROCEDURE — 87086 URINE CULTURE/COLONY COUNT: CPT

## 2020-06-17 PROCEDURE — 99999 PR PBB SHADOW E&M-EST. PATIENT-LVL II: CPT | Mod: PBBFAC,,, | Performed by: OBSTETRICS & GYNECOLOGY

## 2020-06-17 RX ORDER — DOCUSATE SODIUM 100 MG/1
100 CAPSULE, LIQUID FILLED ORAL 2 TIMES DAILY PRN
Qty: 60 CAPSULE | Refills: 1 | Status: SHIPPED | OUTPATIENT
Start: 2020-06-17 | End: 2020-09-18

## 2020-06-17 RX ORDER — FERROUS SULFATE 325(65) MG
325 TABLET ORAL 2 TIMES DAILY
Qty: 60 TABLET | Refills: 1 | Status: SHIPPED | OUTPATIENT
Start: 2020-06-17 | End: 2021-08-02

## 2020-06-17 NOTE — PROGRESS NOTES
"30w1d here for OB visit.     No major complaints today.  Reports active fetus.  Denies vaginal bleeding, leakage of fluid, or contractions.    Pt did not bring her BP log.  Pt reports normal range BP at home, "all less 120/90".  Encourage healthy lifestyle modifications.    Pt was treated for asymptomatic bacteriuria since her last visit.  Denies UTI sxs.  Repeat urine cx today.  UTI precautions.    Pt has f/u with MFM ~32 weeks due to uterine synechiae vs circumvallate placenta.  Placental finding d/w pt.    Mild anemia, start fergon/colace.    Encourage participation in [facility].  COVID 19 precautions.    Labor/preE precautions.  Kick counts.  Return 2 wks or sooner prn.  Voiced understanding.       George Robertson MD  "

## 2020-06-19 LAB — BACTERIA UR CULT: NO GROWTH

## 2020-07-01 DIAGNOSIS — Z36.89 ENCOUNTER FOR ULTRASOUND TO CHECK FETAL GROWTH: Primary | ICD-10-CM

## 2020-07-06 ENCOUNTER — PROCEDURE VISIT (OUTPATIENT)
Dept: MATERNAL FETAL MEDICINE | Facility: CLINIC | Age: 25
End: 2020-07-06
Payer: COMMERCIAL

## 2020-07-06 VITALS — DIASTOLIC BLOOD PRESSURE: 67 MMHG | BODY MASS INDEX: 33.7 KG/M2 | WEIGHT: 190.25 LBS | SYSTOLIC BLOOD PRESSURE: 119 MMHG

## 2020-07-06 DIAGNOSIS — N85.6 UTERINE SYNECHIAE: ICD-10-CM

## 2020-07-06 DIAGNOSIS — Z36.89 ENCOUNTER FOR ULTRASOUND TO CHECK FETAL GROWTH: Primary | ICD-10-CM

## 2020-07-06 DIAGNOSIS — Z36.89 ENCOUNTER FOR ULTRASOUND TO CHECK FETAL GROWTH: ICD-10-CM

## 2020-07-06 PROCEDURE — 76816 PR  US,PREGNANT UTERUS,F/U,TRANSABD APP: ICD-10-PCS | Mod: S$GLB,,, | Performed by: OBSTETRICS & GYNECOLOGY

## 2020-07-06 PROCEDURE — 76816 OB US FOLLOW-UP PER FETUS: CPT | Mod: S$GLB,,, | Performed by: OBSTETRICS & GYNECOLOGY

## 2020-07-08 ENCOUNTER — ROUTINE PRENATAL (OUTPATIENT)
Dept: OBSTETRICS AND GYNECOLOGY | Facility: CLINIC | Age: 25
End: 2020-07-08
Payer: COMMERCIAL

## 2020-07-08 VITALS
SYSTOLIC BLOOD PRESSURE: 114 MMHG | DIASTOLIC BLOOD PRESSURE: 56 MMHG | WEIGHT: 187.38 LBS | BODY MASS INDEX: 33.19 KG/M2

## 2020-07-08 DIAGNOSIS — Z3A.33 33 WEEKS GESTATION OF PREGNANCY: Primary | ICD-10-CM

## 2020-07-08 PROCEDURE — 99999 PR PBB SHADOW E&M-EST. PATIENT-LVL II: CPT | Mod: PBBFAC,,, | Performed by: OBSTETRICS & GYNECOLOGY

## 2020-07-08 PROCEDURE — 3008F PR BODY MASS INDEX (BMI) DOCUMENTED: ICD-10-PCS | Mod: CPTII,S$GLB,, | Performed by: OBSTETRICS & GYNECOLOGY

## 2020-07-08 PROCEDURE — 3008F BODY MASS INDEX DOCD: CPT | Mod: CPTII,S$GLB,, | Performed by: OBSTETRICS & GYNECOLOGY

## 2020-07-08 PROCEDURE — 99999 PR PBB SHADOW E&M-EST. PATIENT-LVL II: ICD-10-PCS | Mod: PBBFAC,,, | Performed by: OBSTETRICS & GYNECOLOGY

## 2020-07-08 PROCEDURE — 0502F PR SUBSEQUENT PRENATAL CARE: ICD-10-PCS | Mod: S$GLB,,, | Performed by: OBSTETRICS & GYNECOLOGY

## 2020-07-08 PROCEDURE — 0502F SUBSEQUENT PRENATAL CARE: CPT | Mod: S$GLB,,, | Performed by: OBSTETRICS & GYNECOLOGY

## 2020-07-08 NOTE — PROGRESS NOTES
"33w1d here for OB visit.     Pt has no major complaints today.  Reports active fetus.  Denies vaginal bleeding, leakage of fluid, or contractions.    Pt did not bring her BP log.  Pt reports normal range BP at home, "all less 120/90".  Encourage healthy lifestyle modifications.    Pt seen [facility] 7/6:    Impression   =========   Izquierdo live intrauterine pregnancy.   Overall normal EFW. AC is at the 9th percentile.   Normal amniotic fluid volume.   Limited fetal anatomy appears normal.   Unable to visualize a uterine synechiae today and unable to document circumvallate placenta today.     Recommendation   ==============   Follow up ultrasound in 3-4 weeks for growth due to borderline AC measurement.     Pt was treated for asymptomatic bacteriuria since her last visit.  Denies UTI sxs.  Repeat urine cx negative.  UTI precautions.    Continue fergon/colace for anemia.  Encourage participation in [facility].  COVID 19 precautions.    Labor/preE precautions.  Kick counts.  Return 2 wks or sooner prn.  Voiced understanding.       George Robertson MD    "

## 2020-07-23 ENCOUNTER — ROUTINE PRENATAL (OUTPATIENT)
Dept: OBSTETRICS AND GYNECOLOGY | Facility: CLINIC | Age: 25
End: 2020-07-23
Payer: COMMERCIAL

## 2020-07-23 VITALS — SYSTOLIC BLOOD PRESSURE: 99 MMHG | BODY MASS INDEX: 32.77 KG/M2 | DIASTOLIC BLOOD PRESSURE: 55 MMHG | WEIGHT: 185 LBS

## 2020-07-23 DIAGNOSIS — Z3A.35 35 WEEKS GESTATION OF PREGNANCY: Primary | ICD-10-CM

## 2020-07-23 PROCEDURE — 0502F SUBSEQUENT PRENATAL CARE: CPT | Mod: CPTII,S$GLB,, | Performed by: OBSTETRICS & GYNECOLOGY

## 2020-07-23 PROCEDURE — 99999 PR PBB SHADOW E&M-EST. PATIENT-LVL II: ICD-10-PCS | Mod: PBBFAC,,, | Performed by: OBSTETRICS & GYNECOLOGY

## 2020-07-23 PROCEDURE — 99999 PR PBB SHADOW E&M-EST. PATIENT-LVL II: CPT | Mod: PBBFAC,,, | Performed by: OBSTETRICS & GYNECOLOGY

## 2020-07-23 PROCEDURE — 0502F PR SUBSEQUENT PRENATAL CARE: ICD-10-PCS | Mod: CPTII,S$GLB,, | Performed by: OBSTETRICS & GYNECOLOGY

## 2020-07-23 NOTE — PROGRESS NOTES
"35w2d here for OB visit.     No major complaints today.  Reports active fetus.  Denies vaginal bleeding, leakage of fluid, or contractions.    Pt did not bring her BP log.  Pt reports normal range BP at home, "all less 120/90".  Encourage healthy lifestyle modifications.    Pt has f/u with MFM on 7/27 for growth due to borderline AC measurement.     Continue fergon/colace for anemia.  Encourage participation in [facility].  COVID 19 precautions.    Labor/preE precautions.  Kick counts.  Return 1 wk or sooner prn.  Voiced understanding.       George Robertson MD      "

## 2020-07-27 ENCOUNTER — INITIAL CONSULT (OUTPATIENT)
Dept: MATERNAL FETAL MEDICINE | Facility: CLINIC | Age: 25
End: 2020-07-27
Payer: COMMERCIAL

## 2020-07-27 ENCOUNTER — PROCEDURE VISIT (OUTPATIENT)
Dept: MATERNAL FETAL MEDICINE | Facility: CLINIC | Age: 25
End: 2020-07-27
Payer: COMMERCIAL

## 2020-07-27 DIAGNOSIS — Z36.89 ENCOUNTER FOR ULTRASOUND TO CHECK FETAL GROWTH: ICD-10-CM

## 2020-07-27 DIAGNOSIS — Z36.89 ENCOUNTER FOR ULTRASOUND TO CHECK FETAL GROWTH: Primary | ICD-10-CM

## 2020-07-27 DIAGNOSIS — O36.5931 IUGR (INTRAUTERINE GROWTH RESTRICTION) AFFECTING CARE OF MOTHER, THIRD TRIMESTER, FETUS 1: Primary | ICD-10-CM

## 2020-07-27 PROCEDURE — 76819 FETAL BIOPHYS PROFIL W/O NST: CPT | Mod: S$GLB,,, | Performed by: PEDIATRICS

## 2020-07-27 PROCEDURE — 76816 PR  US,PREGNANT UTERUS,F/U,TRANSABD APP: ICD-10-PCS | Mod: S$GLB,,, | Performed by: PEDIATRICS

## 2020-07-27 PROCEDURE — 99213 PR OFFICE/OUTPT VISIT, EST, LEVL III, 20-29 MIN: ICD-10-PCS | Mod: 25,S$GLB,, | Performed by: PEDIATRICS

## 2020-07-27 PROCEDURE — 76820 PR US, OB DOPPLER, FETAL UMBILICAL ARTERY ECHO: ICD-10-PCS | Mod: S$GLB,,, | Performed by: PEDIATRICS

## 2020-07-27 PROCEDURE — 99213 OFFICE O/P EST LOW 20 MIN: CPT | Mod: 25,S$GLB,, | Performed by: PEDIATRICS

## 2020-07-27 PROCEDURE — 76816 OB US FOLLOW-UP PER FETUS: CPT | Mod: S$GLB,,, | Performed by: PEDIATRICS

## 2020-07-27 PROCEDURE — 76819 PR US, OB, FETAL BIOPHYSICAL, W/O NST: ICD-10-PCS | Mod: S$GLB,,, | Performed by: PEDIATRICS

## 2020-07-27 PROCEDURE — 76820 UMBILICAL ARTERY ECHO: CPT | Mod: S$GLB,,, | Performed by: PEDIATRICS

## 2020-07-27 NOTE — PROGRESS NOTES
"  Indication  ========    Follow-up evaluation for fetal growth    History  ======    Previous Outcomes  Preg. no. 1  Outcome: miscarriage  Details:   Preg. no. 2  Outcome: live birth  Date: Outcome date: 2012  Gender: male  Details: term, 7lbs, , "Christopher"  Preg. no. 3  Outcome: live birth  Date: Outcome date: 2015  Gender: female  Details: term, 6lbs, , " Nidhi"  Risk Factors  Details: kidney infections  Details: D&C    Pregnancy History  ==============    Maternal Lab Tests  Test: quad screen  Result:    DSR 1:4000  T18 1:32420    Fetal Growth Overview  =================    Exam date        GA              BPD (mm)         HC (mm)        AC (mm)        FL (mm)         HL (mm)        EFW (g)  2020        21w 6d        49.2                  191.4             158.5             37.1              32.8               417    23%  2020          32w 6d        79.9                 300.6              270.6             63.7                                   1,880    17%  2020        35w 6d        84.5                  314.0             280.1             68.5                                   2,207    6%      Method  ======    Transabdominal ultrasound . 2D Color Doppler, [place] E10. View: Good view    Pregnancy  =========    Izquierdo pregnancy. Number of fetuses: 1    Dating  ======    LMP on: 2019  GA by LMP 38 w + 0 d  SARI by LMP: 8/10/2020  Ultrasound examination on: 2020  GA by U/S based upon: AC, BPD, Femur, HC  GA by U/S 34 w + 1 d  SARI by U/S: 2020  Assigned: based on ultrasound (CRL), selected on 2020  Assigned GA 35 w + 6 d  Assigned SARI: 2020  Pregnancy length 280 d    General Evaluation  ==============    Cardiac activity present.  bpm.  Fetal movements visualized.  Presentation cephalic.  Placenta Placental site: anterior.  Umbilical cord Cord vessels: 3 vessel cord.  Amniotic fluid MVP 3.8 cm.    Biophysical " Profile  ==============    2: Fetal breathing movements  2: Gross body movements  2: Fetal tone  2: Amniotic fluid volume  8/8 Biophysical profile score  Interpretation: normal    Fetal Biometry  ============    Standard  BPD 84.5 mm  34w 0d                Hadlock    .2 mm  37w 1d                Felisha    .0 mm  35w 1d                Hadlock    .1 mm  32w 0d                Hadlock    Femur 68.5 mm  35w 1d                Hadlock    HC / AC 1.12    EFW 2,207 g          6%        Fredi    EFW (lb) 4 lb  EFW (oz) 14 oz  EFW by: Hadlock (BPD-HC-AC-FL)  Head / Face / Neck  Cephalic index 0.76    Extremities / Bony Struc  FL / BPD 0.81    FL / AC 0.24    Other Structures   bpm    Fetal Anatomy  ===========    Cranium: appears normal  4-chamber view: normal  Stomach: normal  Kidneys: normal  Bladder: normal  Gender: female  Wants to know gender: yes  Other: A full anatomic survey has been previously performed.    Fetal Doppler  ===========    Arterial  Umbilical A PI 0.80          44%        Nasir    Umbilical A RI 0.56          44%        Nasir    Umbilical A PS -49.19 cm/s    Umbilical A ED -21.90 cm/s  Umbilical A TAmax -34.54 cm/s    Umbilical A MD -21.67 cm/s  Umbilical A S / D 2.32          42%        Nasir    Umbilical A  bpm            Consultation  ==========    On today's ultrasound exam, IUGR is diagnosed with an EFW of 2207 gm which plots at the 6th percentile. AC lags GA by 3 - 5/7 weeks'. No  fetal structural abnormalities are identified, and the amniotic fluid volume is normal. No synechiae noted. Placentation appears normal although  hard to visualize all parameters.    A biophysical profile was performed, and the score was reassuring at 8/8. Umbilical artery Doppler studies were also performed, and the S/D  ratio is normal. Given these findings, intrinsic fetal abnormalities are felt to be an unlikely cause of the IUGR. The patient had screen negative  quad. No  maternal risk factors for fetal IUGR are identified today.      Recommendations:    1. Weekly umbilical artery Doppler studies  2. Twice weekly antepartum testing for fetal well-being; weekly Doppler with BPP at [facility] and weekly NST with OB visit.  3. Reassessment of fetal growth every 3 weeks  4. Delivery by 39 weeks' or as clinically indicated.      I spent 15 minutes in patient care management and consultation with >50% face to face.      Impression  =========    IUGR is identified on today's study. The EFW plots at the 6%. AC lags GA by 3 - 5/7 weeks'.    AFV is normal, and the BPP score is reassuring at 8/8.  Umbilical artery Doppler S/D ratios are normal.      Recommendation  ==============    See above.

## 2020-07-27 NOTE — LETTER
July 27, 2020      George Robertson MD  [address] [facility] [address]  [address]  [facility] [address]           MATERNAL AND FETAL MEDICINE  [address], [address]  [facility] [address]  Phone: [phone]  Fax: [phone]          Patient: Talia Marin   MR Number: [id number]   YOB: 1995   Date of Visit: 7/27/2020       Dear Dr. George Robertson:    Thank you for referring Talia Marin to me for evaluation. Attached you will find relevant portions of my assessment and plan of care.    If you have questions, please do not hesitate to call me. I look forward to following Talia Marin along with you.    Sincerely,    Laura Delgado MD    Enclosure  CC:  No Recipients    If you would like to receive this communication electronically, please contact [email or web address] or [phone] to request more information on EpicCare Link access.    For providers and/or their staff who would like to refer a patient to [facility], please contact us through our one-stop-shop provider referral line, [facility], at [phone].    If you feel you have received this communication in error or would no longer like to receive these types of communications, please e-mail [email or web address]

## 2020-07-31 ENCOUNTER — ROUTINE PRENATAL (OUTPATIENT)
Dept: OBSTETRICS AND GYNECOLOGY | Facility: CLINIC | Age: 25
End: 2020-07-31
Payer: COMMERCIAL

## 2020-07-31 VITALS
SYSTOLIC BLOOD PRESSURE: 110 MMHG | BODY MASS INDEX: 33.44 KG/M2 | WEIGHT: 188.81 LBS | DIASTOLIC BLOOD PRESSURE: 65 MMHG

## 2020-07-31 DIAGNOSIS — O36.5910 INTRAUTERINE GROWTH RESTRICTION (IUGR) AFFECTING CARE OF MOTHER, FIRST TRIMESTER, SINGLE OR UNSPECIFIED FETUS: ICD-10-CM

## 2020-07-31 DIAGNOSIS — Z3A.36 36 WEEKS GESTATION OF PREGNANCY: Primary | ICD-10-CM

## 2020-07-31 PROCEDURE — 0502F PR SUBSEQUENT PRENATAL CARE: ICD-10-PCS | Mod: CPTII,S$GLB,, | Performed by: OBSTETRICS & GYNECOLOGY

## 2020-07-31 PROCEDURE — 99999 PR PBB SHADOW E&M-EST. PATIENT-LVL III: CPT | Mod: PBBFAC,,, | Performed by: OBSTETRICS & GYNECOLOGY

## 2020-07-31 PROCEDURE — 0502F SUBSEQUENT PRENATAL CARE: CPT | Mod: CPTII,S$GLB,, | Performed by: OBSTETRICS & GYNECOLOGY

## 2020-07-31 PROCEDURE — 99999 PR PBB SHADOW E&M-EST. PATIENT-LVL III: ICD-10-PCS | Mod: PBBFAC,,, | Performed by: OBSTETRICS & GYNECOLOGY

## 2020-07-31 NOTE — PROGRESS NOTES
36w3d here for OB visit.     Pregnancy complicated by:  IUGR dx at 36 wks    Pt has no major complaints today.  Reports active fetus.  Denies vaginal bleeding, leakage of fluid, or contractions.    Pt saw MFM for f/u .    Impression   =========   IUGR is identified on today's study. The EFW plots at the 6%. AC lags GA by 3 - 5/7 weeks'.   AFV is normal, and the BPP score is reassuring at 8/8.   Umbilical artery Doppler S/D ratios are normal.     Recommends starting  testing.    NST: Baseline 150's, moderate variability, positive acceleration, no decelerations. [place]: No contraction.  Monitored ~30 mins.  Continue twice wkly NST in office, wkly doppler with MFM until delivery.    Pt declined pelvic exam today, GBS next visit.    Continue fergon/colace for anemia.  Encourage participation in [facility].  COVID precautions.    Labor/preE precautions.  Kick counts.  Return twice wkly for NST or sooner prn.  Voiced understanding.       George Robertson MD

## 2020-08-04 ENCOUNTER — ROUTINE PRENATAL (OUTPATIENT)
Dept: OBSTETRICS AND GYNECOLOGY | Facility: CLINIC | Age: 25
End: 2020-08-04
Payer: COMMERCIAL

## 2020-08-04 VITALS
WEIGHT: 187.38 LBS | BODY MASS INDEX: 33.19 KG/M2 | SYSTOLIC BLOOD PRESSURE: 110 MMHG | DIASTOLIC BLOOD PRESSURE: 68 MMHG

## 2020-08-04 DIAGNOSIS — Z3A.37 37 WEEKS GESTATION OF PREGNANCY: Primary | ICD-10-CM

## 2020-08-04 DIAGNOSIS — O36.5910 INTRAUTERINE GROWTH RESTRICTION (IUGR) AFFECTING CARE OF MOTHER, FIRST TRIMESTER, SINGLE OR UNSPECIFIED FETUS: ICD-10-CM

## 2020-08-04 PROCEDURE — 0502F SUBSEQUENT PRENATAL CARE: CPT | Mod: CPTII,S$GLB,, | Performed by: OBSTETRICS & GYNECOLOGY

## 2020-08-04 PROCEDURE — 59025 FETAL NON-STRESS TEST: CPT | Mod: S$GLB,,, | Performed by: OBSTETRICS & GYNECOLOGY

## 2020-08-04 PROCEDURE — 59025 PR FETAL 2N-STRESS TEST: ICD-10-PCS | Mod: S$GLB,,, | Performed by: OBSTETRICS & GYNECOLOGY

## 2020-08-04 PROCEDURE — 99999 PR PBB SHADOW E&M-EST. PATIENT-LVL III: CPT | Mod: PBBFAC,,, | Performed by: OBSTETRICS & GYNECOLOGY

## 2020-08-04 PROCEDURE — 99999 PR PBB SHADOW E&M-EST. PATIENT-LVL III: ICD-10-PCS | Mod: PBBFAC,,, | Performed by: OBSTETRICS & GYNECOLOGY

## 2020-08-04 PROCEDURE — 0502F PR SUBSEQUENT PRENATAL CARE: ICD-10-PCS | Mod: CPTII,S$GLB,, | Performed by: OBSTETRICS & GYNECOLOGY

## 2020-08-05 ENCOUNTER — PROCEDURE VISIT (OUTPATIENT)
Dept: MATERNAL FETAL MEDICINE | Facility: CLINIC | Age: 25
End: 2020-08-05
Payer: COMMERCIAL

## 2020-08-05 VITALS
BODY MASS INDEX: 33.19 KG/M2 | WEIGHT: 187.38 LBS | DIASTOLIC BLOOD PRESSURE: 67 MMHG | SYSTOLIC BLOOD PRESSURE: 119 MMHG

## 2020-08-05 DIAGNOSIS — Z36.89 ENCOUNTER FOR ULTRASOUND TO CHECK FETAL GROWTH: ICD-10-CM

## 2020-08-05 DIAGNOSIS — O36.5990 PREGNANCY AFFECTED BY FETAL GROWTH RESTRICTION: ICD-10-CM

## 2020-08-05 PROCEDURE — 76819 PR US, OB, FETAL BIOPHYSICAL, W/O NST: ICD-10-PCS | Mod: S$GLB,,, | Performed by: OBSTETRICS & GYNECOLOGY

## 2020-08-05 PROCEDURE — 76820 UMBILICAL ARTERY ECHO: CPT | Mod: S$GLB,,, | Performed by: OBSTETRICS & GYNECOLOGY

## 2020-08-05 PROCEDURE — 76819 FETAL BIOPHYS PROFIL W/O NST: CPT | Mod: S$GLB,,, | Performed by: OBSTETRICS & GYNECOLOGY

## 2020-08-05 PROCEDURE — 76820 PR US, OB DOPPLER, FETAL UMBILICAL ARTERY ECHO: ICD-10-PCS | Mod: S$GLB,,, | Performed by: OBSTETRICS & GYNECOLOGY

## 2020-08-05 NOTE — PROGRESS NOTES
37w0d here for OB visit.     Pregnancy complicated by:  IUGR dx at 36 wks    No major complaints today.  Reports active fetus.  Denies vaginal bleeding, leakage of fluid, or contractions.    NST: Baseline 150's, moderate variability, positive acceleration, no decelerations. [place]: No contraction.  Monitored ~30 mins.  Pt will see MFM qWed for fetal doppler.  Continue twice wkly NST qT/F until delivery unless fetal doppler can be moved to Monday or Tuesday with MFM.    Pt declined pelvic exam today, GBS next visit.    Continue fergon/colace for anemia.  Encourage participation in [facility].  COVID precautions.    Labor/preE precautions.  Kick counts.  Return twice wkly for NST or sooner prn.  Voiced understanding.       George Robertson MD

## 2020-08-07 ENCOUNTER — ROUTINE PRENATAL (OUTPATIENT)
Dept: OBSTETRICS AND GYNECOLOGY | Facility: CLINIC | Age: 25
End: 2020-08-07
Payer: COMMERCIAL

## 2020-08-07 VITALS
DIASTOLIC BLOOD PRESSURE: 60 MMHG | SYSTOLIC BLOOD PRESSURE: 102 MMHG | WEIGHT: 188.25 LBS | BODY MASS INDEX: 33.35 KG/M2

## 2020-08-07 DIAGNOSIS — Z3A.37 37 WEEKS GESTATION OF PREGNANCY: Primary | ICD-10-CM

## 2020-08-07 DIAGNOSIS — O36.5930 INTRAUTERINE GROWTH RESTRICTION (IUGR) AFFECTING CARE OF MOTHER, THIRD TRIMESTER, SINGLE OR UNSPECIFIED FETUS: ICD-10-CM

## 2020-08-07 PROCEDURE — 99999 PR PBB SHADOW E&M-EST. PATIENT-LVL III: ICD-10-PCS | Mod: PBBFAC,,, | Performed by: OBSTETRICS & GYNECOLOGY

## 2020-08-07 PROCEDURE — 0502F SUBSEQUENT PRENATAL CARE: CPT | Mod: CPTII,S$GLB,, | Performed by: OBSTETRICS & GYNECOLOGY

## 2020-08-07 PROCEDURE — 0502F PR SUBSEQUENT PRENATAL CARE: ICD-10-PCS | Mod: CPTII,S$GLB,, | Performed by: OBSTETRICS & GYNECOLOGY

## 2020-08-07 PROCEDURE — 87081 CULTURE SCREEN ONLY: CPT

## 2020-08-07 PROCEDURE — 99999 PR PBB SHADOW E&M-EST. PATIENT-LVL III: CPT | Mod: PBBFAC,,, | Performed by: OBSTETRICS & GYNECOLOGY

## 2020-08-07 RX ORDER — SODIUM CHLORIDE, SODIUM LACTATE, POTASSIUM CHLORIDE, CALCIUM CHLORIDE 600; 310; 30; 20 MG/100ML; MG/100ML; MG/100ML; MG/100ML
INJECTION, SOLUTION INTRAVENOUS CONTINUOUS
Status: CANCELLED | OUTPATIENT
Start: 2020-08-07

## 2020-08-07 RX ORDER — BUTORPHANOL TARTRATE 1 MG/ML
1 INJECTION INTRAMUSCULAR; INTRAVENOUS
Status: CANCELLED | OUTPATIENT
Start: 2020-08-07

## 2020-08-07 RX ORDER — SIMETHICONE 80 MG
1 TABLET,CHEWABLE ORAL 4 TIMES DAILY PRN
Status: CANCELLED | OUTPATIENT
Start: 2020-08-07

## 2020-08-07 RX ORDER — OXYTOCIN/RINGER'S LACTATE 30/500 ML
2 PLASTIC BAG, INJECTION (ML) INTRAVENOUS CONTINUOUS
Status: CANCELLED | OUTPATIENT
Start: 2020-08-12

## 2020-08-07 RX ORDER — CALCIUM CARBONATE 200(500)MG
500 TABLET,CHEWABLE ORAL 3 TIMES DAILY PRN
Status: CANCELLED | OUTPATIENT
Start: 2020-08-07

## 2020-08-07 RX ORDER — ONDANSETRON 4 MG/1
8 TABLET, ORALLY DISINTEGRATING ORAL EVERY 8 HOURS PRN
Status: CANCELLED | OUTPATIENT
Start: 2020-08-07

## 2020-08-07 RX ORDER — OXYTOCIN/RINGER'S LACTATE 30/500 ML
95 PLASTIC BAG, INJECTION (ML) INTRAVENOUS ONCE
Status: CANCELLED | OUTPATIENT
Start: 2020-08-07 | End: 2020-08-07

## 2020-08-07 RX ORDER — OXYTOCIN/RINGER'S LACTATE 30/500 ML
334 PLASTIC BAG, INJECTION (ML) INTRAVENOUS ONCE
Status: CANCELLED | OUTPATIENT
Start: 2020-08-07 | End: 2020-08-07

## 2020-08-07 NOTE — PROGRESS NOTES
37w3d here for OB visit.     Pregnancy complicated by:  IUGR dx at 36 wks    Pt has no major complaints today.  Reports active fetus.  Denies vaginal bleeding, leakage of fluid, or contractions.    NST: Baseline 150's, moderate variability, positive acceleration, no decelerations. [place]: No contraction.  Monitored ~30 mins.  Continue twice wkly NST, wkly MVP until delivery.    Impression   =========   BPP . Normal AFV.   Normal UA Doppler S/D ratio.     Recommendation   ==============   Recommend delivery at 38 weeks, unless indicated earlier.   Continue antepartum testing with primary OB.   F/u with MFM early next week for repeat BPP and UA Doppler.     Induction of labor for  for IUGR.  Risks, benefits, and alternatives to IOL reviewed including but not limited to failed induction resulting .  Pt would like to proceed with IOL.  Pre-admission instructions reviewed pt.    Order GBS.  Delivery consents signed.    Continue fergon/colace for anemia.  Encourage participation in [facility]cted [facility].  COVID precautions.    Labor/preE precautions.  Kick counts.  Go to L&D for any concerns.  Return prn.  Voiced understanding.       George Robertson MD

## 2020-08-09 LAB — BACTERIA SPEC AEROBE CULT: NORMAL

## 2020-08-10 ENCOUNTER — PROCEDURE VISIT (OUTPATIENT)
Dept: MATERNAL FETAL MEDICINE | Facility: CLINIC | Age: 25
End: 2020-08-10
Payer: COMMERCIAL

## 2020-08-10 VITALS
WEIGHT: 191.81 LBS | BODY MASS INDEX: 33.98 KG/M2 | DIASTOLIC BLOOD PRESSURE: 65 MMHG | SYSTOLIC BLOOD PRESSURE: 123 MMHG

## 2020-08-10 DIAGNOSIS — Z36.89 ENCOUNTER FOR ULTRASOUND TO CHECK FETAL GROWTH: ICD-10-CM

## 2020-08-10 DIAGNOSIS — O36.5930 INTRAUTERINE GROWTH RESTRICTION (IUGR) AFFECTING CARE OF MOTHER, THIRD TRIMESTER, SINGLE OR UNSPECIFIED FETUS: ICD-10-CM

## 2020-08-10 PROCEDURE — 76820 PR US, OB DOPPLER, FETAL UMBILICAL ARTERY ECHO: ICD-10-PCS | Mod: S$GLB,,, | Performed by: OBSTETRICS & GYNECOLOGY

## 2020-08-10 PROCEDURE — 76819 FETAL BIOPHYS PROFIL W/O NST: CPT | Mod: S$GLB,,, | Performed by: OBSTETRICS & GYNECOLOGY

## 2020-08-10 PROCEDURE — 76820 UMBILICAL ARTERY ECHO: CPT | Mod: S$GLB,,, | Performed by: OBSTETRICS & GYNECOLOGY

## 2020-08-10 PROCEDURE — 76819 PR US, OB, FETAL BIOPHYSICAL, W/O NST: ICD-10-PCS | Mod: S$GLB,,, | Performed by: OBSTETRICS & GYNECOLOGY

## 2020-08-11 ENCOUNTER — HOSPITAL ENCOUNTER (INPATIENT)
Facility: HOSPITAL | Age: 25
LOS: 2 days | Discharge: HOME | End: 2020-08-13
Attending: OBSTETRICS & GYNECOLOGY | Admitting: OBSTETRICS & GYNECOLOGY
Payer: COMMERCIAL

## 2020-08-11 DIAGNOSIS — O36.5930 INTRAUTERINE GROWTH RESTRICTION (IUGR) AFFECTING CARE OF MOTHER, THIRD TRIMESTER, SINGLE OR UNSPECIFIED FETUS: ICD-10-CM

## 2020-08-11 DIAGNOSIS — Z3A.37 37 WEEKS GESTATION OF PREGNANCY: ICD-10-CM

## 2020-08-11 LAB
ABO + RH BLD: NORMAL
BASOPHILS # BLD AUTO: 0.03 K/UL (ref 0–0.2)
BASOPHILS NFR BLD: 0.3 % (ref 0–1.9)
BLD GP AB SCN CELLS X3 SERPL QL: NORMAL
DIFFERENTIAL METHOD: ABNORMAL
EOSINOPHIL # BLD AUTO: 0.1 K/UL (ref 0–0.5)
EOSINOPHIL NFR BLD: 0.6 % (ref 0–8)
ERYTHROCYTE [DISTWIDTH] IN BLOOD BY AUTOMATED COUNT: 13.7 % (ref 11.5–14.5)
HCT VFR BLD AUTO: 30.7 % (ref 37–48.5)
HGB BLD-MCNC: 9.7 G/DL (ref 12–16)
HIV1+2 IGG SERPL QL IA.RAPID: NORMAL
IMM GRANULOCYTES # BLD AUTO: 0.06 K/UL (ref 0–0.04)
IMM GRANULOCYTES NFR BLD AUTO: 0.6 % (ref 0–0.5)
LYMPHOCYTES # BLD AUTO: 2.9 K/UL (ref 1–4.8)
LYMPHOCYTES NFR BLD: 30.7 % (ref 18–48)
MCH RBC QN AUTO: 25.8 PG (ref 27–31)
MCHC RBC AUTO-ENTMCNC: 31.6 G/DL (ref 32–36)
MCV RBC AUTO: 82 FL (ref 82–98)
MONOCYTES # BLD AUTO: 0.8 K/UL (ref 0.3–1)
MONOCYTES NFR BLD: 8 % (ref 4–15)
NEUTROPHILS # BLD AUTO: 5.7 K/UL (ref 1.8–7.7)
NEUTROPHILS NFR BLD: 59.8 % (ref 38–73)
NRBC BLD-RTO: 0 /100 WBC
PLATELET # BLD AUTO: 317 K/UL (ref 150–350)
PMV BLD AUTO: 10.9 FL (ref 9.2–12.9)
RBC # BLD AUTO: 3.76 M/UL (ref 4–5.4)
SARS-COV-2 RDRP RESP QL NAA+PROBE: NEGATIVE
WBC # BLD AUTO: 9.54 K/UL (ref 3.9–12.7)

## 2020-08-11 PROCEDURE — 85025 COMPLETE CBC W/AUTO DIFF WBC: CPT

## 2020-08-11 PROCEDURE — 63600175 PHARM REV CODE 636 W HCPCS: Performed by: OBSTETRICS & GYNECOLOGY

## 2020-08-11 PROCEDURE — 11000001 HC ACUTE MED/SURG PRIVATE ROOM

## 2020-08-11 PROCEDURE — 36415 COLL VENOUS BLD VENIPUNCTURE: CPT

## 2020-08-11 PROCEDURE — U0002 COVID-19 LAB TEST NON-CDC: HCPCS

## 2020-08-11 PROCEDURE — 86850 RBC ANTIBODY SCREEN: CPT

## 2020-08-11 PROCEDURE — 72100002 HC LABOR CARE, 1ST 8 HOURS

## 2020-08-11 PROCEDURE — 86703 HIV-1/HIV-2 1 RESULT ANTBDY: CPT

## 2020-08-11 PROCEDURE — 25000003 PHARM REV CODE 250: Performed by: OBSTETRICS & GYNECOLOGY

## 2020-08-11 PROCEDURE — 86592 SYPHILIS TEST NON-TREP QUAL: CPT

## 2020-08-11 RX ORDER — OXYTOCIN/RINGER'S LACTATE 30/500 ML
95 PLASTIC BAG, INJECTION (ML) INTRAVENOUS ONCE
Status: DISCONTINUED | OUTPATIENT
Start: 2020-08-11 | End: 2020-08-12

## 2020-08-11 RX ORDER — BUTORPHANOL TARTRATE 1 MG/ML
1 INJECTION INTRAMUSCULAR; INTRAVENOUS
Status: DISCONTINUED | OUTPATIENT
Start: 2020-08-11 | End: 2020-08-12

## 2020-08-11 RX ORDER — ONDANSETRON 8 MG/1
8 TABLET, ORALLY DISINTEGRATING ORAL EVERY 8 HOURS PRN
Status: DISCONTINUED | OUTPATIENT
Start: 2020-08-11 | End: 2020-08-12

## 2020-08-11 RX ORDER — SIMETHICONE 80 MG
1 TABLET,CHEWABLE ORAL 4 TIMES DAILY PRN
Status: DISCONTINUED | OUTPATIENT
Start: 2020-08-11 | End: 2020-08-12

## 2020-08-11 RX ORDER — SODIUM CHLORIDE, SODIUM LACTATE, POTASSIUM CHLORIDE, CALCIUM CHLORIDE 600; 310; 30; 20 MG/100ML; MG/100ML; MG/100ML; MG/100ML
INJECTION, SOLUTION INTRAVENOUS CONTINUOUS
Status: DISCONTINUED | OUTPATIENT
Start: 2020-08-11 | End: 2020-08-12

## 2020-08-11 RX ORDER — OXYTOCIN/RINGER'S LACTATE 30/500 ML
334 PLASTIC BAG, INJECTION (ML) INTRAVENOUS ONCE
Status: COMPLETED | OUTPATIENT
Start: 2020-08-11 | End: 2020-08-12

## 2020-08-11 RX ORDER — OXYTOCIN/RINGER'S LACTATE 30/500 ML
2 PLASTIC BAG, INJECTION (ML) INTRAVENOUS CONTINUOUS
Status: DISCONTINUED | OUTPATIENT
Start: 2020-08-12 | End: 2020-08-12

## 2020-08-11 RX ORDER — CALCIUM CARBONATE 200(500)MG
500 TABLET,CHEWABLE ORAL 3 TIMES DAILY PRN
Status: DISCONTINUED | OUTPATIENT
Start: 2020-08-11 | End: 2020-08-12

## 2020-08-11 RX ADMIN — BUTORPHANOL TARTRATE 1 MG: 1 INJECTION, SOLUTION INTRAMUSCULAR; INTRAVENOUS at 10:08

## 2020-08-11 RX ADMIN — PROMETHAZINE HYDROCHLORIDE 12.5 MG: 25 INJECTION INTRAMUSCULAR; INTRAVENOUS at 10:08

## 2020-08-11 RX ADMIN — DINOPROSTONE 10 MG: 10 INSERT VAGINAL at 04:08

## 2020-08-11 RX ADMIN — SODIUM CHLORIDE, SODIUM LACTATE, POTASSIUM CHLORIDE, AND CALCIUM CHLORIDE: .6; .31; .03; .02 INJECTION, SOLUTION INTRAVENOUS at 10:08

## 2020-08-11 NOTE — NURSING
1600-Pt presents to labor and delivery for scheduled cervidil induction.  Pt reports good fetal movement and denies contractions, LOF, or vaginal bleeding.  Pt to take shower and call with done for admission assessment.     1700-MD Aceves notified of pending  delivery. IUGR 6th percentile.

## 2020-08-11 NOTE — H&P
[facility]    Obstetrics & Gynecology  History & Physical      Patient Name:  Talia Marin  MRN:  [id number]  Admission Date:  2020  Hospital Length of Stay:  0  Attending Physician:  George Robertson MD  Primary Care Provider:  Yen Luque MD    Date:  2020    Chief Complaint:  Induction of labor    History of Present Illness:      Talia Marin is a 25 y.o.  at 38w0d who presents to L&D for induction of labor secondary to IUGR.  Pt has no major complaints.  Pt reports active fetal movements and occasional mild contractions, and denies any vaginal bleeding or leakage of fluid.      Past Medical History:      None      Past Surgical History:     H/o D&C for missed/spontaneous  with blood transfusion    Medications:      PTA Medications   Medication Sig    docusate sodium (COLACE) 100 MG capsule Take 1 capsule (100 mg total) by mouth 2 (two) times daily as needed for Constipation.    ferrous sulfate (FEOSOL) 325 mg (65 mg iron) Tab tablet Take 1 tablet (325 mg total) by mouth 2 (two) times daily.    ondansetron (ZOFRAN-ODT) 4 MG TbDL Take 1 tablet (4 mg total) by mouth every 6 (six) hours as needed.    prenat.vits,deni,min-iron-folic (PRENATAL VITAMIN) Tab Take 1 tablet by mouth once daily.     Allergies:      NKDA      Obstetric History:       Para Term  AB Living   5 3 3 0 1 3   SAB TAB Ectopic Multiple Live Births   1 0 0 0 3      # Outcome Date GA Lbr Brandan/2nd Weight Sex Delivery Anes PTL Lv   4 Term 19 39w3d  3.685 kg (8 lb 2 oz) M Vag-Spont EPI N ALEE      Name: ETTA MARIN      Apgar1: 9  Apgar5: 9   3 Term 06/08/15   2.722 kg (6 lb) F Vag-Spont  N ALEE      Name: Nidhi   2 Term 12   3.175 kg (7 lb) M Vag-Spont  N ALEE      Name: Rickey   1 SAB               Obstetric Comments   G2- IUFD of twins at 17 wks     Gynecologic History:      Denies recent/active STI  Denies history abnormal Pap, last pap 2017 at  [facility]     Social History:      Denies tobacco, alcohol or illicit drug use  Current partner is father of baby  Denies domestic abuse     Family History:       Denies congenital anomalies, inherited syndromes, fetal aneuploidy    Review of Systems   Constitutional: Negative.    HENT: Negative.    Eyes: Negative.    Respiratory: Negative.    Cardiovascular: Negative.    Gastrointestinal: Negative.    Endocrine: Negative.    Genitourinary: Negative.    Musculoskeletal: Positive for back pain.   Integumentary:  Negative.   Neurological: Negative.    Hematological: Negative.    Psychiatric/Behavioral: Negative.    Breast: negative.       Vitals:  See charting    Physical Exam:   Constitutional: She appears well-developed. No distress.                             Alert and oriented to person, place and time.  HEENT:  Normocephalic, atraumatic, anicteric, EOMI, moist mucus membranes.  Neck supple without masses.  LUNGS:  Clear to auscultation bilaterally.  HEART:  Regular rate & rhythm with physiologic heart sounds.  ABDOMEN:  Soft, non tender without any guarding, rigidity or rebound. Normoactive bowel sounds.  PELVIC:  Normal female external genitalia without gross lesions, rashes or excoriations. No gross vaginal or cervical lesions.  Adequate pelvis.  Cervix: FT/30%/-2, no bleeding or LOF.  EXTREMITIES:  Symmetric without cramping, claudication. Trace edema LE. +2 distal pulses.  Full range of motion.  SKIN:  No rashes, good turgor & capillary refill.  NEUROLOGIC:  Grossly intact bilaterally. +2 DTR, symmetric.  PSYCH:  Mood & affect appropriate.       NST    Category: I  Tocometry: irregular ctx    Assessment:     25 y.o.  at 38w0d for induction of labor secondary to IUGR    Plan:    Admit to L&D for induction of labor    Pt was informed of the risks, benefits, alternatives and possible complications to induction of labor (with cervidil, cytotec, pitocin, and/or matos bulb), vaginal delivery, operative vaginal  delivery,  section, blood transfusion, and the possibility of failed labor induction resulting in a  section due to maternal or fetal indications.  All questions were answered to her satisfaction.  Pt  voiced understanding and acceptance of these risks, and wishes to proceed with induction of labor.  Informed consents were obtained for delivery and blood transfusions.    Pt is requesting BTL in event of .  Risks, benefits, and alternatives to BTL discussed.    Routine admit labs    Continuous fetal monitoring    Consult anesthesia, epidural prn      George Robertson MD

## 2020-08-12 ENCOUNTER — ANESTHESIA EVENT (OUTPATIENT)
Dept: OBSTETRICS AND GYNECOLOGY | Facility: HOSPITAL | Age: 25
End: 2020-08-12
Payer: COMMERCIAL

## 2020-08-12 ENCOUNTER — ANESTHESIA (OUTPATIENT)
Dept: OBSTETRICS AND GYNECOLOGY | Facility: HOSPITAL | Age: 25
End: 2020-08-12
Payer: COMMERCIAL

## 2020-08-12 PROBLEM — O36.5930 POOR FETAL GROWTH AFFECTING MANAGEMENT OF MOTHER IN THIRD TRIMESTER: Status: ACTIVE | Noted: 2020-08-12

## 2020-08-12 PROBLEM — Z3A.38 38 WEEKS GESTATION OF PREGNANCY: Status: ACTIVE | Noted: 2020-08-11

## 2020-08-12 LAB — RPR SER QL: NORMAL

## 2020-08-12 PROCEDURE — 72200004 HC VAGINAL DELIVERY LEVEL I

## 2020-08-12 PROCEDURE — C1751 CATH, INF, PER/CENT/MIDLINE: HCPCS | Performed by: ANESTHESIOLOGY

## 2020-08-12 PROCEDURE — 72100003 HC LABOR CARE, EA. ADDL. 8 HRS

## 2020-08-12 PROCEDURE — 63600175 PHARM REV CODE 636 W HCPCS: Performed by: ANESTHESIOLOGY

## 2020-08-12 PROCEDURE — 59400 OBSTETRICAL CARE: CPT | Mod: AA,,, | Performed by: ANESTHESIOLOGY

## 2020-08-12 PROCEDURE — 59400 PRA FULL ROUT OBSTE CARE,VAGINAL DELIV: ICD-10-PCS | Mod: AA,,, | Performed by: ANESTHESIOLOGY

## 2020-08-12 PROCEDURE — 62326 NJX INTERLAMINAR LMBR/SAC: CPT | Performed by: ANESTHESIOLOGY

## 2020-08-12 PROCEDURE — 59400 PR FULL ROUT OBSTE CARE,VAGINAL DELIV: ICD-10-PCS | Mod: ,,, | Performed by: OBSTETRICS & GYNECOLOGY

## 2020-08-12 PROCEDURE — 25000003 PHARM REV CODE 250: Performed by: OBSTETRICS & GYNECOLOGY

## 2020-08-12 PROCEDURE — 11000001 HC ACUTE MED/SURG PRIVATE ROOM

## 2020-08-12 PROCEDURE — 59400 OBSTETRICAL CARE: CPT | Mod: ,,, | Performed by: OBSTETRICS & GYNECOLOGY

## 2020-08-12 PROCEDURE — 63600175 PHARM REV CODE 636 W HCPCS: Performed by: OBSTETRICS & GYNECOLOGY

## 2020-08-12 PROCEDURE — 27200710 HC EPIDURAL INFUSION PUMP SET: Performed by: ANESTHESIOLOGY

## 2020-08-12 PROCEDURE — 25000003 PHARM REV CODE 250: Performed by: ANESTHESIOLOGY

## 2020-08-12 PROCEDURE — 51702 INSERT TEMP BLADDER CATH: CPT

## 2020-08-12 PROCEDURE — 63600175 PHARM REV CODE 636 W HCPCS: Performed by: NURSE ANESTHETIST, CERTIFIED REGISTERED

## 2020-08-12 RX ORDER — OXYTOCIN/RINGER'S LACTATE 30/500 ML
41.65 PLASTIC BAG, INJECTION (ML) INTRAVENOUS CONTINUOUS
Status: DISCONTINUED | OUTPATIENT
Start: 2020-08-12 | End: 2020-08-12

## 2020-08-12 RX ORDER — OXYCODONE AND ACETAMINOPHEN 5; 325 MG/1; MG/1
1 TABLET ORAL EVERY 4 HOURS PRN
Status: DISCONTINUED | OUTPATIENT
Start: 2020-08-12 | End: 2020-08-13 | Stop reason: HOSPADM

## 2020-08-12 RX ORDER — ONDANSETRON 8 MG/1
8 TABLET, ORALLY DISINTEGRATING ORAL EVERY 8 HOURS PRN
Status: DISCONTINUED | OUTPATIENT
Start: 2020-08-12 | End: 2020-08-13 | Stop reason: HOSPADM

## 2020-08-12 RX ORDER — DIPHENHYDRAMINE HCL 25 MG
25 CAPSULE ORAL EVERY 4 HOURS PRN
Status: DISCONTINUED | OUTPATIENT
Start: 2020-08-12 | End: 2020-08-13 | Stop reason: HOSPADM

## 2020-08-12 RX ORDER — HYDROCORTISONE 25 MG/G
CREAM TOPICAL 3 TIMES DAILY PRN
Status: DISCONTINUED | OUTPATIENT
Start: 2020-08-12 | End: 2020-08-13 | Stop reason: HOSPADM

## 2020-08-12 RX ORDER — DOCUSATE SODIUM 100 MG/1
200 CAPSULE, LIQUID FILLED ORAL 2 TIMES DAILY PRN
Status: DISCONTINUED | OUTPATIENT
Start: 2020-08-12 | End: 2020-08-13 | Stop reason: HOSPADM

## 2020-08-12 RX ORDER — ROPIVACAINE HYDROCHLORIDE 2 MG/ML
INJECTION, SOLUTION EPIDURAL; INFILTRATION; PERINEURAL CONTINUOUS PRN
Status: DISCONTINUED | OUTPATIENT
Start: 2020-08-12 | End: 2020-08-12

## 2020-08-12 RX ORDER — FENTANYL CITRATE 50 UG/ML
INJECTION, SOLUTION INTRAMUSCULAR; INTRAVENOUS
Status: DISCONTINUED | OUTPATIENT
Start: 2020-08-12 | End: 2020-08-12

## 2020-08-12 RX ORDER — IBUPROFEN 600 MG/1
600 TABLET ORAL EVERY 6 HOURS PRN
Status: DISCONTINUED | OUTPATIENT
Start: 2020-08-12 | End: 2020-08-13 | Stop reason: HOSPADM

## 2020-08-12 RX ORDER — OXYCODONE AND ACETAMINOPHEN 10; 325 MG/1; MG/1
1 TABLET ORAL EVERY 4 HOURS PRN
Status: DISCONTINUED | OUTPATIENT
Start: 2020-08-12 | End: 2020-08-13 | Stop reason: HOSPADM

## 2020-08-12 RX ORDER — BUPIVACAINE HYDROCHLORIDE 2.5 MG/ML
INJECTION, SOLUTION EPIDURAL; INFILTRATION; INTRACAUDAL CONTINUOUS PRN
Status: DISCONTINUED | OUTPATIENT
Start: 2020-08-12 | End: 2020-08-12

## 2020-08-12 RX ORDER — BUPIVACAINE HYDROCHLORIDE 2.5 MG/ML
INJECTION, SOLUTION EPIDURAL; INFILTRATION; INTRACAUDAL
Status: DISCONTINUED | OUTPATIENT
Start: 2020-08-12 | End: 2020-08-12

## 2020-08-12 RX ORDER — SIMETHICONE 80 MG
1 TABLET,CHEWABLE ORAL EVERY 6 HOURS PRN
Status: DISCONTINUED | OUTPATIENT
Start: 2020-08-12 | End: 2020-08-13 | Stop reason: HOSPADM

## 2020-08-12 RX ORDER — PHENYLEPHRINE HYDROCHLORIDE 10 MG/ML
INJECTION INTRAVENOUS
Status: DISCONTINUED | OUTPATIENT
Start: 2020-08-12 | End: 2020-08-12

## 2020-08-12 RX ADMIN — PHENYLEPHRINE HYDROCHLORIDE 100 MCG: 10 INJECTION INTRAVENOUS at 03:08

## 2020-08-12 RX ADMIN — ROPIVACAINE HYDROCHLORIDE 7 ML/HR: 2 INJECTION, SOLUTION EPIDURAL; INFILTRATION at 01:08

## 2020-08-12 RX ADMIN — DOCUSATE SODIUM 200 MG: 100 CAPSULE, LIQUID FILLED ORAL at 09:08

## 2020-08-12 RX ADMIN — Medication 334 MILLI-UNITS/MIN: at 08:08

## 2020-08-12 RX ADMIN — IBUPROFEN 600 MG: 600 TABLET, FILM COATED ORAL at 02:08

## 2020-08-12 RX ADMIN — FENTANYL CITRATE 100 MCG: 50 INJECTION, SOLUTION INTRAMUSCULAR; INTRAVENOUS at 01:08

## 2020-08-12 RX ADMIN — BUPIVACAINE HYDROCHLORIDE 6 ML: 2.5 INJECTION, SOLUTION EPIDURAL; INFILTRATION; INTRACAUDAL; PERINEURAL at 01:08

## 2020-08-12 RX ADMIN — IBUPROFEN 600 MG: 600 TABLET, FILM COATED ORAL at 09:08

## 2020-08-12 RX ADMIN — IBUPROFEN 600 MG: 600 TABLET, FILM COATED ORAL at 10:08

## 2020-08-12 RX ADMIN — SODIUM CHLORIDE, SODIUM LACTATE, POTASSIUM CHLORIDE, AND CALCIUM CHLORIDE 1000 ML: .6; .31; .03; .02 INJECTION, SOLUTION INTRAVENOUS at 02:08

## 2020-08-12 RX ADMIN — BUPIVACAINE HYDROCHLORIDE 7 ML/HR: 2.5 INJECTION, SOLUTION EPIDURAL; INFILTRATION; INTRACAUDAL; PERINEURAL at 06:08

## 2020-08-12 NOTE — ANESTHESIA PROCEDURE NOTES
Epidural    Patient location during procedure: OB   Reason for block: primary anesthetic   Diagnosis: IUP   Start time: 8/12/2020 1:50 AM  Timeout: 8/12/2020 1:50 AM  End time: 8/12/2020 2:15 AM    Staffing  Performing Provider: Michael Galvan MD  Authorizing Provider: Michael Galvan MD        Preanesthetic Checklist  Completed: patient identified, site marked, pre-op evaluation, timeout performed, IV checked, risks and benefits discussed, monitors and equipment checked, anesthesia consent given, hand hygiene performed and patient being monitored  Preparation  Patient position: sitting  Prep: ChloraPrep  Patient monitoring: ECG, Pulse Ox and Blood Pressure  Epidural  Skin Anesthetic: lidocaine 1%  Skin Wheal: 5 mL  Administration type: continuous  Approach: midline  Interspace: L3-4    Injection technique: NI air  Needle and Epidural Catheter  Needle type: Tuohy   Needle gauge: 17  Needle length: 3.5 inches  Needle insertion depth: 5 cm  Catheter type: springwound and multi-orifice  Catheter size: 19 G  Catheter at skin depth: 10 cm  Test dose: 3 mL of lidocaine 1.5% with Epi 1-to-200,000  Additional Documentation: incremental injection, no paresthesia on injection, no significant pain on injection, negative aspiration for heme and CSF, no signs/symptoms of IV or SA injection and no significant complaints from patient  Needle localization: anatomical landmarks  Assessment  Upper dermatomal levels - Left: T6  Right: T6   Dermatomal levels determined by alcohol wipe  Ease of block: easy  Patient's tolerance of the procedure: comfortable throughout block and no complaintsNo inadvertent dural puncture with Tuohy.

## 2020-08-12 NOTE — L&D DELIVERY NOTE
[facility]   Delivery Summary  Obstetrics & Gynecology       Date of Delivery:  2020        Preoperative Diagnosis:    Izquierdo gestation at 38w1d   IUGR 8%tile    Postoperative Diagnosis:    Izquierdo gestation at 38w1d s/p spontaneous vaginal delivery  Live infant  IUGR 8%tile  Procedure Performed:  Vaginal delivery    Indication (HPI):     Talia Marin is a 25 y.o.  at 38w0d who presents to L&D for induction of labor secondary to IUGR.  Pt has no major complaints.  Pt reports active fetal movements and occasional mild contractions, and denies any vaginal bleeding or leakage of fluid.  The induction was initiated with Cervidil x 1 dose, pt did not need pitocin augmentation.  Pt progressed well through the active phase of labor and delivered a viable infant.  Maternal and fetal status was overall reassuring throughout the labor course.  AROM with moderate, clear fluid, no odor and had no intrapartum fever at time of delivery.  Pt was informed of the risks, benefits, alternatives and possible complications to a vaginal delivery.  Informed consent was obtained for delivery and blood transfusion.      Anesthesia:  Epidural     EBL:  150 mL with no replacements    Specimens:  Cord blood    Findings, Infant & Apgars:      Live female infant, APGAR 1 min: 9, 5 min: 9, transfer to well baby  Weight 2510 grams)   AROM at time of delivery with moderate, clear fluid, no odor, no intrapartum fever   BEBE    Second degree midline laceration, repaired    Complications:  None    Delivery Summary:      Vaginal delivery of viable infant.  Infant delivered after 1 minutes of pushing.  Nuchal cord x 2 reduced.  No shoulder dystocia.  No laceration.  Episiotomy was not performed.  The infant was vigorous with a strong cry.  Cord blood was collected.   The placenta delivered spontaneously intact with three vessel cord.    Uterine massage and 20 units of Pitocin IV were given until the fundus was  firm.    Hemostasis was noted.    No sponges were left in the vagina.   Sponge, lap, needle, and instrument counts were correct time two.   Mother and baby were bonding well at the end of the delivery both in stable condition.   Findings and expectations were discussed with the patient.   All of pt questions were answered to her satisfaction, pt voiced understanding.      George Robertson MD

## 2020-08-12 NOTE — LACTATION NOTE
"   08/12/20 0900   Pain/Comfort/Sleep   Pain Body Location abdomen   Pain Rating (0-10): Rest 2   Pain Rating (0-10): Activity 2   Pain Rating: Rest 2 - mild pain   Pain Rating: Activity 2 - mild pain   Quality cramping   Reproductive   Uterus WDL WDL;all   Uterus Position midline   Uterus Consistency firm with massage   Fundal Height 1 cm below umbilicus   Lochia 1-3 Days WDL   Lochia 1-3 Days WDL WDL;all   Lochia Color rubra   Lochia Amount scant (less than 2.5 cm on pad/hr)   Lochia Odor none   Perineum WDL   Perineum WDL WDL   Breasts WDL   Breast WDL WDL   Maternal Feeding Assessment   Maternal Emotional State relaxed;independent   Infant Positioning cradle   Signs of Milk Transfer audible swallow;infant jaw motion present   Latch Assistance no   Reproductive Interventions   Breastfeeding Assistance infant latch-on verified;infant suck/swallow verified   Breastfeeding Support encouragement provided;lactation counseling provided     Independently breastfeeding well without complications, cradle hold with audible gulping noted.   Basic breastfeeding instructions given and Mother's Breastfeeding Guide reviewed.  Encouraged to call for assist prn.  States "understand" and verbalized appropriate recall.    "

## 2020-08-12 NOTE — NURSING
"07-RN call to MD Robertson with update.  Pt's cervix now a "stretchy 8cm" per night shift RN with bulging bag of water.  Pt c/o increased rectal pressure.  RN requests MD come to hospital for AROM and delivery, as pt is .  MD stated he will come to hospital and asked RN to call with any changes as needed.    0747-RN call to MD Robertson with update on FHT.  Tachycardia with late decelerations.  Pt afebrile.  MD en route to unit.     0807-MD Robertson at bedside.     1115-RN attempted to assist pt to bathroom.  Pt not able to stand up safely, so pt transitioned to wheelchair.  Linens changed and RN called for second assist and caesar steady from [place] [facility] to get back into bed.  Pericare done in bed.  Bleeding scant and fundus firm/midline.  No straight cath needed at this time.  Panties and pads applied and pt resting in no apparent distress.     1210-Report given to Bernie on Post partum unit.  Per pt orders, if not void at 4 hours, pt must have bladder scanned and if >100ml detected, RN to perform straight cath.  RN transferred pt to post partum room.  Bleeding still scant and fundus firm.  Bladder scanner reported >500ml.  Pt attempted to use bed pan, but was unsuccessful.  RN emptied bladder at 1230 with straight cath for 900cc.   "

## 2020-08-12 NOTE — ANESTHESIA PREPROCEDURE EVALUATION
08/12/2020  Talia Marin is a 25 y.o., female.    Anesthesia Evaluation          Review of Systems  Anesthesia Hx:  No previous Anesthesia   Social:  Non-Smoker    Hematology/Oncology:  Hematology Normal   Oncology Normal     EENT/Dental:EENT/Dental Normal   Cardiovascular:  Cardiovascular Normal     Pulmonary:  Pulmonary Normal    Renal/:  Renal/ Normal     Hepatic/GI:  Hepatic/GI Normal    Musculoskeletal:  Musculoskeletal Normal    Neurological:  Neurology Normal    Endocrine:  Endocrine Normal    Dermatological:  Skin Normal    Psych:  Psychiatric Normal           Physical Exam  General:  Well nourished    Airway/Jaw/Neck:  Airway Findings: Mallampati: II TM Distance: < 4 cm      Dental:  DENTAL FINDINGS: Normal   Chest/Lungs:  Chest/Lungs Clear    Heart/Vascular:  Heart Findings: Normal       Mental Status:  Mental Status Findings:  Cooperative, Alert and Oriented         Anesthesia Plan  Type of Anesthesia, risks & benefits discussed:  Anesthesia Type:  epidural  Patient's Preference:   Intra-op Monitoring Plan: standard ASA monitors  Intra-op Monitoring Plan Comments:   Post Op Pain Control Plan: multimodal analgesia, IV/PO Opioids PRN and per primary service following discharge from PACU  Post Op Pain Control Plan Comments:   Induction:    Beta Blocker:  Patient is not currently on a Beta-Blocker (No further documentation required).       Informed Consent: Patient understands risks and agrees with Anesthesia plan.  Questions answered. Anesthesia consent signed with patient.  ASA Score: 2     Day of Surgery Review of History & Physical:    H&P update referred to the provider.  H&P completed by Anesthesiologist.   Anesthesia Plan Notes: npo        Ready For Surgery From Anesthesia Perspective.

## 2020-08-13 VITALS
RESPIRATION RATE: 17 BRPM | OXYGEN SATURATION: 97 % | SYSTOLIC BLOOD PRESSURE: 111 MMHG | WEIGHT: 187 LBS | HEART RATE: 86 BPM | BODY MASS INDEX: 33.13 KG/M2 | DIASTOLIC BLOOD PRESSURE: 57 MMHG | TEMPERATURE: 98 F | HEIGHT: 63 IN

## 2020-08-13 LAB
BASOPHILS # BLD AUTO: 0.02 K/UL (ref 0–0.2)
BASOPHILS NFR BLD: 0.2 % (ref 0–1.9)
DIFFERENTIAL METHOD: ABNORMAL
EOSINOPHIL # BLD AUTO: 0.1 K/UL (ref 0–0.5)
EOSINOPHIL NFR BLD: 1.1 % (ref 0–8)
ERYTHROCYTE [DISTWIDTH] IN BLOOD BY AUTOMATED COUNT: 13.8 % (ref 11.5–14.5)
HCT VFR BLD AUTO: 26.6 % (ref 37–48.5)
HGB BLD-MCNC: 8.3 G/DL (ref 12–16)
IMM GRANULOCYTES # BLD AUTO: 0.09 K/UL (ref 0–0.04)
IMM GRANULOCYTES NFR BLD AUTO: 1.1 % (ref 0–0.5)
LYMPHOCYTES # BLD AUTO: 2.8 K/UL (ref 1–4.8)
LYMPHOCYTES NFR BLD: 33.5 % (ref 18–48)
MCH RBC QN AUTO: 26.2 PG (ref 27–31)
MCHC RBC AUTO-ENTMCNC: 31.2 G/DL (ref 32–36)
MCV RBC AUTO: 84 FL (ref 82–98)
MONOCYTES # BLD AUTO: 0.9 K/UL (ref 0.3–1)
MONOCYTES NFR BLD: 10.5 % (ref 4–15)
NEUTROPHILS # BLD AUTO: 4.5 K/UL (ref 1.8–7.7)
NEUTROPHILS NFR BLD: 53.6 % (ref 38–73)
NRBC BLD-RTO: 0 /100 WBC
PLATELET # BLD AUTO: 243 K/UL (ref 150–350)
PMV BLD AUTO: 9.8 FL (ref 9.2–12.9)
RBC # BLD AUTO: 3.17 M/UL (ref 4–5.4)
WBC # BLD AUTO: 8.32 K/UL (ref 3.9–12.7)

## 2020-08-13 PROCEDURE — 25000003 PHARM REV CODE 250: Performed by: OBSTETRICS & GYNECOLOGY

## 2020-08-13 PROCEDURE — 85025 COMPLETE CBC W/AUTO DIFF WBC: CPT

## 2020-08-13 PROCEDURE — 36415 COLL VENOUS BLD VENIPUNCTURE: CPT

## 2020-08-13 RX ORDER — IBUPROFEN 600 MG/1
600 TABLET ORAL EVERY 6 HOURS PRN
Qty: 40 TABLET | Refills: 0 | Status: SHIPPED | OUTPATIENT
Start: 2020-08-13 | End: 2020-09-18

## 2020-08-13 RX ADMIN — IBUPROFEN 600 MG: 600 TABLET, FILM COATED ORAL at 08:08

## 2020-08-13 RX ADMIN — DOCUSATE SODIUM 200 MG: 100 CAPSULE, LIQUID FILLED ORAL at 08:08

## 2020-08-13 NOTE — NURSING
Discharge instructions given.  Mother Baby Care Guide reviewed. RN reviewed signs/symptoms to call MD for, signs/symptoms to call 911 for. RN also reviewed COVID prevention and follow up appt. Teach back performed. All questions answered. She verbalized understanding. Patient in agreement with discharge.

## 2020-08-13 NOTE — LACTATION NOTE
08/13/20 1000   Maternal Assessment   Breast Density Bilateral:;soft   Areola Bilateral:;elastic   Nipples Bilateral:;everted   Maternal Infant Feeding   Infant Positioning side-lying   Signs of Milk Transfer audible swallow;infant jaw motion present   Pain with Feeding no   Comfort Measures Before/During Feeding latch adjusted   Latch Assistance yes     Mother latched baby to right breast in side lying position. Baby latched deeply, nursing well with audible swallows. Mother denies pain during feeding. Reviewed basic breastfeeding information and encouraged patient to call me for any further breastfeeding assistance. Patient verbalizes understanding of all instructions with good recall.

## 2020-08-13 NOTE — PLAN OF CARE
VSS, breastfeeding on demand, encouraged 8 times in 24 hours, wearing supportive bra. Fundus and lochia WDL. Voiding, passing flatus, + BM, ambulating and tolerating regular diet well. Bonding well with baby. Pain being managed with motrin  as needed. Stated an understanding to POC.  AM labs to be drawn.

## 2020-08-13 NOTE — DISCHARGE SUMMARY
[facility]    Discharge Summary  Obstetrics & Gynecology      Patient Name:  Talia Marin  MRN:  [id number]  Admission Date:  2020  Discharge Date:  2020  Hospital Length of Stay:  2  Attending Physician:  George Robertson MD  Discharging Physician:  George Robertson MD  Primary Care Provider:  Yen Luque MD  Date of Delivery:  2020    Admitting Diagnosis:       Izquierdo gestation at 38w1d  Induction of labor secondary to IUGR  Anemia, iron deficiency      Discharge Diagnosis:    Izquierdo gestation at term s/p spontaneous vaginal delivery   IUGR  Anemia, iron deficiency      Procedure performed:      Vaginal delivery    Brief Hospital Course:      Talia Marin is a 25 y.o.  at 38w0d who presents to L&D for induction of labor secondary to IUGR.  Pt has no major complaints.  Pt reports active fetal movements and occasional mild contractions, and denies any vaginal bleeding or leakage of fluid.  The induction was initiated with Cervidil x 1 dose, pt did not need pitocin augmentation.  Pt progressed well through the active phase of labor and delivered a viable infant.  Maternal and fetal status was overall reassuring throughout the labor course.  AROM with moderate, clear fluid, no odor and had no intrapartum fever at time of delivery.  See delivery note for further details.  Following delivery, pt was transferred to mother baby unit for routine post-partum care.  Pt had a fairly uncomplicated postpartum course.  Prior to discharge, she was without major complaints.  Pt pain was well controlled.  Pt was ambulating, tolerating a regular diet, voiding without difficulty, and bonding well with baby.  Lochia was light.  Vital signs where physiologic and stable.  Physical exam showed no acute findings.  Pt had satisfied routine postpartum discharge criteria and expressed the desire to be discharge from the hospital.     Pertinent Labs or Studies:      Recent Results (from  "the past 336 hour(s))   CBC auto differential    Collection Time: 08/13/20  5:23 AM   Result Value Ref Range    WBC 8.32 3.90 - 12.70 K/uL    Hemoglobin 8.3 (L) 12.0 - 16.0 g/dL    Hematocrit 26.6 (L) 37.0 - 48.5 %    Platelets 243 150 - 350 K/uL   CBC with Auto Differential    Collection Time: 08/11/20  5:09 PM   Result Value Ref Range    WBC 9.54 3.90 - 12.70 K/uL    Hemoglobin 9.7 (L) 12.0 - 16.0 g/dL    Hematocrit 30.7 (L) 37.0 - 48.5 %    Platelets 317 150 - 350 K/uL     ABO:  O POS    Discharge Exam:      Temp:  [97.3 °F (36.3 °C)-97.8 °F (36.6 °C)] 97.6 °F (36.4 °C)  Pulse:  [69-86] 86  Resp:  [17-20] 17  SpO2:  [96 %-97 %] 97 %  BP: (103-119)/(52-71) 111/57    BP (!) 111/57 (BP Location: Right arm, Patient Position: Sitting)   Pulse 86   Temp 97.6 °F (36.4 °C) (Oral)   Resp 17   Ht 5' 3" (1.6 m)   Wt 84.8 kg (187 lb)   LMP 11/04/2019   SpO2 97%   Breastfeeding Yes   BMI 33.13 kg/m²     GENERAL:  No acute distress. Alert and oriented x 3.   HEENT:  Normocephalic. No scleral icterus. Neck supple. Moist mucus membranes.   LUNGS:  Clear to auscultation bilaterally.   HEART:  Regular rate and rhythm with physiologic heart sounds.   ABDOMEN:  Soft, non-tender, non-distended, no guarding, rigidity, or rebound.   FUNDUS:  Firm, nontender below the umbilicus.   EXTREMITIES:  No cramping, claudication.  Trace edema. Good skin turgor. +2 distal pulses, symmetric. Full range of motion.   NEUROLOGIC:  Grossly intact bilaterally.   PSYCH:  Mood and affect appropriate.   PERINEUM:  Intact with light lochia.    Discharge Condition:  Stable      Discharge Disposition:  Home       Discharge Medications:     docusate sodium (COLACE) 100 MG capsule  Take 1 capsule (100 mg total) by mouth 2 (two) times daily as needed for Constipation.     ferrous sulfate (FEOSOL) 325 mg (65 mg iron) Tab tablet  Take 1 tablet (325 mg total) by mouth 2 (two) times daily.     ibuprofen (ADVIL,MOTRIN) 600 MG tablet  Take 1 tablet (600 mg " total) by mouth every 6 (six) hours as needed.     ondansetron (ZOFRAN-ODT) 4 MG TbDL  Take 1 tablet (4 mg total) by mouth every 6 (six) hours as needed.     prenat.vits,deni,min-iron-folic (PRENATAL VITAMIN) Tab  Take 1 tablet by mouth once daily.     Discharge Activites:      Resume activities as tolerated with adequate rest  Pelvic rest for 6 weeks   No heavy lifting greater 10 lbs or strenuous activities   Showers only  Wound care instructions and precautions given   Do NOT driving and operating machinery while taking narcotics or other sedative medications, pt voiced understanding.    Discharge Diet:  Regular diet    Discharge Instructions:      Pt was instructed to contact the clinic or emergency department for any concerns including but not limited to an increase in her lochia, abdominal pain, foul vaginal discharge, weakness, decrease activity level, decrease appetite, feeling sad or hopeless, inability to care for her baby, breast pain or infection, difficulty with voiding or having bowel movements, perineal pain or breakdown, wound breakdown, fever >100.4 or abnormal vital signs, shortness of breath, chest pain, dizziness or feeling faint, pain or swelling in her extremities, headaches not relieved with rest and Tylenol, vision changes, seizure activities, abnormal bleeding/bruising, or any other health concerns.  Post-partum care instructions and precautions, labs/studies, clinical/delivery findings, and hospital course reviewed with the patient prior to discharge.  All of her questions were answered to her satisfaction.  Pt voiced understanding.       Follow-up Visit:  6 weeks for postpartum visit, or sooner if any problems.       George Robertson MD

## 2020-08-13 NOTE — PROGRESS NOTES
"[facility]    Obstetrics & Gynecology  Progress Note      Patient Name:  Talia Marin  MRN:  [id number]  Admission Date:  2020  Hospital Length of Stay:  2  Attending Physician:  George Robertson MD  Primary Care Provider:  Yen Luque MD    Subjective:     Date:  2020    Principal Problem:   (spontaneous vaginal delivery)    Hospital Course:  Post Partum Day # 1 - s/p vaginal delivery at 38w1d    Patient without major complaints  No acute events overnight  Denies headaches, vision changes, epigastric pain, SOB, CP  Pain well controlled  Lochia less than menses  Bottle/breast feeding   Breast non-tender, non-engorged  Ambulating, tolerating regular diet, and voiding without diffculty  Bonding well with baby    Objective:     Temp:  [97.3 °F (36.3 °C)-98.4 °F (36.9 °C)] 97.6 °F (36.4 °C)  Pulse:  [69-86] 86  Resp:  [17-20] 17  SpO2:  [96 %-99 %] 97 %  BP: (103-119)/(52-71) 111/57    BP (!) 111/57 (BP Location: Right arm, Patient Position: Sitting)   Pulse 86   Temp 97.6 °F (36.4 °C) (Oral)   Resp 17   Ht 5' 3" (1.6 m)   Wt 84.8 kg (187 lb)   LMP 2019   SpO2 97%   Breastfeeding Yes   BMI 33.13 kg/m²     Intake/Output Summary (Last 24 hours) at 2020 1302  Last data filed at 2020 0600  Gross per 24 hour   Intake 1440 ml   Output 1400 ml   Net 40 ml   Clear, bijal urine    Physical Exam:   Constitutional: She appears well-developed. No distress.                             Alert and oriented x 3.   HEENT:  No scleral icterus. Neck supple. Moist mucus membranes.   LUNGS:  Clear to auscultation bilaterally.   HEART:  Regular rate and rhythm with physiologic heart sounds.   ABDOMEN:  Soft, non-tender, non-distended, no guarding, rigidity, or rebound with normoactive bowel sounds.   FUNDUS:  Firm, nontender below the umbilicus.   EXTREMITIES:  No cramping, claudication.  Trace edema LE. +2 distal pulses. Symmetric, full range of motion, negative " Jn.  NEUROLOGIC:  Grossly intact bilaterally. +2 DTR's.   PSYCH:  Mood and affect appropriate.   PERINEUM:  Intact with light lochia.    Labs:      Recent Results (from the past 336 hour(s))   CBC auto differential    Collection Time: 08/13/20  5:23 AM   Result Value Ref Range    WBC 8.32 3.90 - 12.70 K/uL    Hemoglobin 8.3 (L) 12.0 - 16.0 g/dL    Hematocrit 26.6 (L) 37.0 - 48.5 %    Platelets 243 150 - 350 K/uL   CBC with Auto Differential    Collection Time: 08/11/20  5:09 PM   Result Value Ref Range    WBC 9.54 3.90 - 12.70 K/uL    Hemoglobin 9.7 (L) 12.0 - 16.0 g/dL    Hematocrit 30.7 (L) 37.0 - 48.5 %    Platelets 317 150 - 350 K/uL     ABO:  O POS    Assessment:     1. Post-partum day # 1 - IUP at 38w1d s/p spontaneous vaginal delivery - progressing well  2. Anemia, iron deficiency      Plan:     Plan for discharge today.     Iron supplementation, anemia precautions    Reviewed discharge medications.  Pt was instructed to avoid driving or operate heavy machinery while taking narcotics or other medications with sedative properties.    Post-partum care instructions and precautions, clinical/delivery findings, and hospital course reviewed with pt prior to discharge.  All of her questions were answered to her satisfaction.  Pt voiced understanding and agrees with discharge.    Return to clinic in 6 weeks for post-partum visit or sooner if any concerns.  Go to ER for any emergencies.    Disposition:  As patient meets milestones, will plan to discharge.      George Robertson MD

## 2020-08-13 NOTE — DISCHARGE INSTRUCTIONS
After a Vaginal Birth    General Discharge Instructions  · May follow a regular diet, unless otherwise discussed with physician.  · Take showers, not baths unless otherwise discussed with physician.  · Activity as tolerated.  · No lifting or heavy exercise for 6 weeks, no driving for 2 weeks, no sexual intercourse, douching or tampons for 6 weeks  · May return to work/school as discussed with physician  · Discuss birth control with physician  · Take Rx as directed  · Breast care support bra worn at all times  · Lactation consultant referral number ( [phone] or [phone])    Call Your Healthcare Provider Right Away If You Have:  · A temperature of 100.4°F or higher.  · If your blood pressure is over 155/105.  · You have difficulty catching your breath or trouble breathing.  · Heavy vaginal bleeding, clots, or vaginal discharge with foul odor. (heavier than menses)  · Persistent nausea or vomiting.  · You gain more than 3 pounds in 3 days.  · Severe headaches not relieved by Tylenol (acetaminophen) or Motrin (ibuprofen)  · Blurry or double vision, see spots or flashing lights.  · Dizziness or fainting.  · New onset swelling or worsening of existing swelling.  · Burning or pain when you urinate.  · No bowel movement for 5 days.  · Redness, warmth, swelling, or pain in the lower leg.  · Redness, discharge, or pain worse than you had in the hospital.  · Burning, pain, red streaks, or lumpy areas in your breasts.  · Cracks, blisters, or blood on your nipples.  · Feelings of extreme sadness or anxiety, or a feeling that you dont want to be with your baby.  · If you have any new or unusual symptoms or have questions or concerns    Some of these symptoms can occur up to 4 to 6 weeks after delivery. This can be a sign of pre-eclampsia, which is a serious disease that can cause stroke, seizures, organ damage, or death. Do not wait to call your doctor or seek medical attention.        If You Had Stitches  You may have  received stitches in the skin near your vagina. The stitches might have closed an episiotomy (an incision that enlarges the opening of the vagina). Or you may have needed stitches to repair torn skin. Either way, your stitches should dissolve within weeks. Until then, you can help reduce discomfort, aid healing, and reduce your risk of infection by keeping the stitches clean. These tips can help:  · Gently wipe from front to back after you urinate or have a bowel movement.  · After wiping, spray warm water on the area. Or you can have a sitz bath. This means sitting in a tub with a few inches of water in it. Then pat the area dry or use a hairdryer on a cool setting.  · You can take a shower instead of a bath  · Change sanitary pads at least every 2-4 hours.  · Place cold or heat packs on the area as directed by your doctors or nurses. Keep a thin towel between the pack and your skin.  · Sit on firm seats so the stitches pull less.     Follow-Up  Schedule a  follow-up exam with your healthcare provider for about 6 weeks after delivery. During this exam, your uterus and vaginal area will be checked. Contact your healthcare provider if you think you are having any problems.     Breastfeeding Discharge Instructions      AAP recommendation of exclusive breastfeeding for the first 6 months of life and continued breastfeeding with the introduction of supplemental foods beyond the first year of life and recommends to delay all bottle and pacifier use until after 4 weeks of age and breastfeeding is well established.  Discussed the benefits of exclusive breastfeeding for both mother and baby.  Discussed the risks of supplementation/pacifier use on the exclusivity of breastfeeding in the first 6 months. Feed the baby at the earliest sign of hunger or comfort  o Hands to mouth, sucking motions  o Rooting or searching for something to suck on  o Dont wait for crying - it is a not a late sign of hunger; it  is a sign of distress     The feedings may be 8-12 times per 24hrs and will not follow a schedule   Alternate the breast you start the feeding with, or start with the breast that feels the fullest   Switch breasts when the baby takes himself off the breast or falls asleep   Keep offering breasts until the baby looks full, no longer gives hunger signs, and stays asleep when placed on his back in the crib   If the baby is sleepy and wont wake for a feeding, put the baby skin-to-skin dressed in a diaper against the mothers bare chest   Sleep near your baby   The baby should be positioned and latched on to the breast correctly  o Chest-to-chest, chin in the breast  o Babys lips are flipped outward  o Babys mouth is stretched open wide like a shout  o Babys sucking should feel like tugging to the mother  - The baby should be drinking at the breast:  o You should hear swallowing or gulping throughout the feeding  o You should see milk on the babys lips when he comes off the breast  o Your breasts should be softer when the baby is finished feeding  o The baby should look relaxed at the end of feedings  o After the 4th day and your milk is in:  o The babys poop should turn bright yellow and be loose, watery, and seedy  o The baby should have at least 3-4 poops the size of the palm of your hand per day  o The baby should have at least 6-8 wet diapers per day  o The urine should be light yellow in color  You should drink when you are thirsty and eat a healthy diet when you are    hungry.     Take naps to get the rest you need.   Take medications and/or drink alcohol only with permission of your obstetrician    or the babys pediatrician.  You can also call the [facility],   ([phone]), Monday-Friday, 8am-5pm Central time, to get the most   up-to-date evidence-based information on the use of medications during   pregnancy and breastfeeding.      The baby should be examined by a pediatrician at 3-5  days of age; unless ordered sooner by the pediatrician.  Once your milk comes in, the baby should be back to birth weight no later than 10-14 days of age.    Primary Engorgement    If the milk is flowing, use wet or dry heat applied to the breasts for approximately 10min prior to each feeding as a comfort measure to facilitate the milk ejection reflex    Follow heat treatment with breast massage to soften hard/lumpy areas of the breast    Use unrestricted, frequent, effective feedings.      Wake baby to feed if necessary    Avoid pacifier and bottle feedings    Hand express or pump breasts to the point of comfort prn    Use cold treatments in the form of ice packs/gel packs/ frozen vegetables wrapped in a soft thin cloth and applied to the breasts for approximately 20min after each feeding until engorgement is resolved    Wear comfortable, supportive bra    Take pain medicine prn    Use anti-inflammatory medications if prescribed by physician    Other:    [place] Pumping Instructions :    Preparation and Hygiene:    1. Shower daily.  2. Wear a clean bra each day and wash daily in warm soapy water.  3. Change wet or moist breast pads frequently.  Moist pads can promote growth of germs.  4. Actively wash your hands, paying close attention to the area around and under your fingernails, thoroughly with soap and water for 15 seconds before pumping or handling your milk.  Re-wash your hands if you touch anything (scratching your nose, answering the phone, etc) while pumping or handling your milk.   5. Before pumping your breasts, assemble the pump collection kit and have ready the sterile container and labels.  Place these items on a clean surface next to the breastpump.  6. Each time after you have finished pumping, take apart all of the parts of the breastpump collection kit and place them in a separate cleaning container (do not place them in the sink).  Be sure to remove the yellow valve from the breastshield and  separate the white membrane from the yellow valve.  Rinse all of these parts with cool water.  Then use a new sponge and/or bottle brush and dishwashing detergent to clean the parts.  Rinse off the soapy water with cool water and air dry on a clean towel covered with a clean cloth.  All parts may also be washed after each use in the top rack of a .  7. Once each day, sterilize all of the parts of the breastpump collection kit.  This can be done by boiling the kit parts for 10 minutes or by using a Quick Clean Micro-Steam Bag made by [employer]  8. If condensation appears in the tubing, continue to run the pump with the tubing attached for 1-2 minutes or until the tubing is dry.   9. Notify your babys nurse or doctor if you become ill or need to take any medication, even over-the-counter medicines.        Collection and Storage of Expressed Breastmilk:         1. Pump your breasts at least 8-10 times every 24 hours.  Double pump (both breasts at  the same time) for at least 15-20 minutes using the most suction that is comfortable.    2. Write the date and time of pumping and the name of any medications you are takingon the babys pre-printed hospital identification label.   3.    Do not touch the inside of the storage containers or lids.      4.        Tightly screw the lid onto the container and place immediately into the                                refrigerator for daily use.  Bottle may remain at room temperature if the next                    feeding is within 4 hours.  5.    Expressed breastmilk should be refrigerated or frozen within 4 hours of                pumping.  6.        Do not store expressed breastmilk on the door of your refrigerator or freeze             where the temperature is warmer.   7.        Refrigerated milk may be stored in for up to 7 days.  At this point it can be              moved to the freezer for 6 -12 months.  8.        Thaw frozen breast milk in overnight in the  refrigerator.  Once milk is thawed it              must be used within 24 hours.  9.        Refrigerated breast milk needs to be warmed to room temperature.  Warm by leaving unrefrigerated until it reached room temperature, or place sealed bottle into a cup of warm (not boiling) water or use a bottle warmer.               Never warm breast milk in a microwave or boiling water.    For any questions or concerns call:  Lactation Department at [phone]

## 2020-08-13 NOTE — LACTATION NOTE
Reviewed breastfeeding discharge instructions with patient. Patient given pump prescription. Patient encouraged to call lactation department for any breastfeeding related questions or concerns. Patient verbalizes understanding of all instructions with good recall.

## 2020-08-16 ENCOUNTER — TELEPHONE (OUTPATIENT)
Dept: OBSTETRICS AND GYNECOLOGY | Facility: HOSPITAL | Age: 25
End: 2020-08-16

## 2020-08-16 NOTE — TELEPHONE ENCOUNTER
"Lactation Telephone Follow up:  Spoke with pt.  Reports breastfeeding well q 2 - 3 hours for 20 minutes.  Milk is in and breasts soften with feedings.  Reports 10 wets and 10 brown/seedy stools in last 24 hours.  Has ped appt for Monday; instructed to keep as scheduled.  Has concerns with unrelieved headache and pounding in head.  Has take ibuprofen with minimal relief.  Took blood pressure last night and systolic = 144, does not remember diastolic.  Repeated again this am and systolic = 172.  Instructed to notify Dr Robertson now for further eval, or if sysmptoms worsened to report to ED.  Instructed on how to contact MD by answering service.  States "understand" and will call now.  "

## 2020-08-17 NOTE — ANESTHESIA POSTPROCEDURE EVALUATION
Anesthesia Post Evaluation    Patient: Talia Marin    Procedure(s) Performed: * No procedures listed *    Final Anesthesia Type: epidural    Patient location during evaluation: PACU  Patient participation: Yes- Able to Participate  Level of consciousness: awake and alert, oriented and awake  Post-procedure vital signs: reviewed and stable  Pain management: adequate  Airway patency: patent    PONV status at discharge: No PONV  Anesthetic complications: no      Cardiovascular status: blood pressure returned to baseline, hemodynamically stable and stable  Respiratory status: unassisted and spontaneous ventilation  Hydration status: euvolemic  Follow-up not needed.          Vitals Value Taken Time   /57 08/13/20 1126   Temp 36.4 °C (97.6 °F) 08/13/20 1126   Pulse 86 08/13/20 1126   Resp 17 08/13/20 1126   SpO2 97 % 08/13/20 1126         No case tracking events are documented in the log.      Pain/Catie Score: No data recorded

## 2020-08-23 ENCOUNTER — NURSE TRIAGE (OUTPATIENT)
Dept: ADMINISTRATIVE | Facility: CLINIC | Age: 25
End: 2020-08-23

## 2020-08-23 NOTE — TELEPHONE ENCOUNTER
PP x 12 days (8/11).      X 3 days ago, heavy vaginal bleeding with lots of clots, golf-ball sized clots every 2 hours with cramping. Pt states pain to bilateral legs and chills. Denies SOB, chest pain, weakness or dizziness. Using approx 6 pads/day, saturating 3 of those pads per day. Advised per protocol. Will f/u with OBGYN tomorrow and call back with worsening symptoms.     Reason for Disposition   [1] Passing blood clots  AND [2] persists > 4 days postpartum    Additional Information   Negative: Shock suspected (e.g., cold/pale/clammy skin, too weak to stand, low BP, rapid pulse)   Negative: Difficult to awaken or acting confused  (e.g., disoriented, slurred speech)   Negative: Passed out (i.e., lost consciousness, collapsed and was not responding)   Negative: Sounds like a life-threatening emergency to the triager   Negative: SEVERE dizziness (e.g., unable to stand, requires support to walk, feels like passing out now)   Negative: [1] SEVERE abdominal pain AND [2] present > 1 hour   Negative: Fever > 100.4 F (38.0 C)   Negative: SEVERE vaginal bleeding (i.e., soaking 2 pads or tampons per hour and present 2 or more hours)   Negative: Patient sounds very sick or weak to the triager   Negative: MODERATE vaginal bleeding (i.e., soaking 1 pad or tampon per hour and present > 6 hours)   Negative: [1] Constant abdominal pain AND [2] present > 2 hours   Negative: Pale skin (pallor) of new onset or worsening   Negative: Foul smelling vaginal discharge (i.e., lochia)   Negative: Bleeding with > 6 soaked pads per day    Protocols used: ST POSTPARTUM - VAGINAL BLEEDING AND LOCHIA-A-AH

## 2020-09-18 ENCOUNTER — POSTPARTUM VISIT (OUTPATIENT)
Dept: OBSTETRICS AND GYNECOLOGY | Facility: CLINIC | Age: 25
End: 2020-09-18
Payer: COMMERCIAL

## 2020-09-18 VITALS
WEIGHT: 174.63 LBS | BODY MASS INDEX: 30.94 KG/M2 | HEIGHT: 63 IN | SYSTOLIC BLOOD PRESSURE: 120 MMHG | DIASTOLIC BLOOD PRESSURE: 64 MMHG

## 2020-09-18 DIAGNOSIS — Z30.09 FAMILY PLANNING COUNSELING: ICD-10-CM

## 2020-09-18 DIAGNOSIS — Z12.4 CERVICAL CANCER SCREENING: ICD-10-CM

## 2020-09-18 PROCEDURE — 0503F PR POSTPARTUM CARE VISIT: ICD-10-PCS | Mod: S$GLB,,, | Performed by: OBSTETRICS & GYNECOLOGY

## 2020-09-18 PROCEDURE — 99999 PR PBB SHADOW E&M-EST. PATIENT-LVL II: ICD-10-PCS | Mod: PBBFAC,,, | Performed by: OBSTETRICS & GYNECOLOGY

## 2020-09-18 PROCEDURE — 99999 PR PBB SHADOW E&M-EST. PATIENT-LVL II: CPT | Mod: PBBFAC,,, | Performed by: OBSTETRICS & GYNECOLOGY

## 2020-09-18 PROCEDURE — 0503F POSTPARTUM CARE VISIT: CPT | Mod: S$GLB,,, | Performed by: OBSTETRICS & GYNECOLOGY

## 2020-09-18 PROCEDURE — 88175 CYTOPATH C/V AUTO FLUID REDO: CPT

## 2020-09-18 NOTE — PROGRESS NOTES
"[facility]  Ambulatory Clinic  Obstetrics & Gynecology    Date of Visit:  2020    Chief Complaint:  Post-partum visit    Subjective:      Talia Marin is a 25 y.o.  who is s/p spontaneous vaginal delivery at term here for post-partum visit.  Pt has no major complaints today.  Pt has been doing well since delivery.  Pt reports baby is doing well and she is breast/bottle feeding without difficulty.  Her lochia resolved.  Pt denies abdominal/pelvic pain, fevers, abnormal vaginal discharge, symptoms of post-partum blues or depression, breast complaints, GI or urinary compliants.  Pt is requesting Nexplanon.    Review of Systems:      CONSTITUTIONAL:  No fever, chills, weakness, fatigue, decreased activity  HEENT: No headaches, vision changes  LUNGS:  No shortness of breath  HEART:  No palpitations, chest pain, abnormal swelling  BREAST:  No lump, pain, redness, skin changes  GI:  No nausea, vomiting, diarrhea, constipation, abdomen pain, blood in stool  URINARY:  No dysuria, hematuria, frequency  SKIN:  No rash, bruising  NEURO:  No headache, weakness  PSYCH:  No anxiety, depression, suicidal or homicidal ideations    Objective:     /64 (BP Location: Right arm, Patient Position: Sitting, BP Method: Medium (Manual))   Ht 5' 3" (1.6 m)   Wt 79.2 kg (174 lb 9.7 oz)   LMP 2019   Breastfeeding Yes   BMI 30.93 kg/m²      GENERAL:  NAD, A&Ox3, well nourished.  HEENT:  NCAT,moist mucus membranes, neck supple.  BREAST:  Symmetric, non-tender, no abnormal masses, skin changes, or discharge.  LUNGS:  CTA-B.  HEART:  RRR with physiologic heart sounds.  ABDOMEN:  Soft, non-tender, non-distended without any guarding, rigidity or rebound. Normoactive bowel sounds.    EXT:  Symmetric. No cramping, claudication, or edema. +2 distal pulses. FROM.  SKIN:  No rashes, good turgor & capillary refill.  NEURO:  Grossly intact bilaterally. +2 DTR.  PSYCH:  Mood & affect appropriate.   "     PELVIC:  Normal female external genitalia without any obvious lesions.  Perinuem intact.  Adequate perineal body.  Normal urethral meatus.  No gross lymphadenopathy.   Vagina:  Intact with good support, lochia resolved.     Cervix:  No cervical motion tenderness, discharge, or obvious lesions.    Uterus:  Small, non-tender, normal contour.    Adnexa:  No masses, non-tender.    Rectal:  Patient declined.  No obvious external lesions.     Chaperone present for exam.    Assessment:     1. 25 y.o.  s/p  at term - doing well post-partum  2. Family planning - order Nexplanon    Plan:    Post-partum care instructions and precautions reviewed.  Pelvic rest x 6 wks post-partum.  Continue prenatal vitamins.    Pap obtained today per pt request.    We discussed in detail her contraceptive options.  After an extensive discussion, pt is requesting Nexplanon.  Risks, benefits, and alternatives to Nexplanon discussed.  Will order Nexplanon pending insurance benefits verification process.  Continue condoms in meantime.    F/u with PCP for health maintenance.  Encourage healthy lifestyle modifications.    Will schedule pt for Nexplanon insertion once received by office.     Return sooner as needed.  Pt voiced understanding.       George Robertson MD

## 2020-09-28 ENCOUNTER — TELEPHONE (OUTPATIENT)
Dept: OBSTETRICS AND GYNECOLOGY | Facility: CLINIC | Age: 25
End: 2020-09-28

## 2020-09-28 NOTE — TELEPHONE ENCOUNTER
No answer, left message.        ----- Message from Kelsy havenrosette sent at 9/25/2020  2:48 PM CDT -----  Type: Patient Call Back    Who called: pt     What is the request in detail: pt asking for a call back to r/s procedure    Can the clinic reply by [facility]? No    Would the patient rather a call back or a response via [facility]? Call back     Best call back number: [phone] (home)     Additional Information:

## 2020-09-30 ENCOUNTER — PROCEDURE VISIT (OUTPATIENT)
Dept: OBSTETRICS AND GYNECOLOGY | Facility: CLINIC | Age: 25
End: 2020-09-30
Payer: COMMERCIAL

## 2020-09-30 VITALS — WEIGHT: 167 LBS | HEIGHT: 63 IN | BODY MASS INDEX: 29.59 KG/M2

## 2020-09-30 DIAGNOSIS — Z30.46 ENCOUNTER FOR NEXPLANON REMOVAL: Primary | ICD-10-CM

## 2020-09-30 DIAGNOSIS — Z32.02 NEGATIVE PREGNANCY TEST: ICD-10-CM

## 2020-09-30 PROBLEM — O36.5930 POOR FETAL GROWTH AFFECTING MANAGEMENT OF MOTHER IN THIRD TRIMESTER: Status: RESOLVED | Noted: 2020-08-12 | Resolved: 2020-09-30

## 2020-09-30 PROBLEM — Z3A.38 38 WEEKS GESTATION OF PREGNANCY: Status: RESOLVED | Noted: 2020-08-11 | Resolved: 2020-09-30

## 2020-09-30 LAB
B-HCG UR QL: NEGATIVE
CTP QC/QA: YES

## 2020-09-30 PROCEDURE — 11981 PR INSERT, DRUG DELIVERY IMPLANT, BIORESORB/BIODEGR/NON-BIODEGR: ICD-10-PCS | Mod: S$GLB,,, | Performed by: OBSTETRICS & GYNECOLOGY

## 2020-09-30 PROCEDURE — 99499 UNLISTED E&M SERVICE: CPT | Mod: S$GLB,,, | Performed by: OBSTETRICS & GYNECOLOGY

## 2020-09-30 PROCEDURE — 99499 NO LOS: ICD-10-PCS | Mod: S$GLB,,, | Performed by: OBSTETRICS & GYNECOLOGY

## 2020-09-30 PROCEDURE — 11981 INSERTION DRUG DLVR IMPLANT: CPT | Mod: S$GLB,,, | Performed by: OBSTETRICS & GYNECOLOGY

## 2020-09-30 NOTE — PROCEDURES
"[facility]  Ambulatory Clinic   Obstetrics & Gynecology    Date:  2020    Procedure:  Nexplanon insertion (CPT 44545, NDC 2297-0983-35)    LMP:  20    UPT:  Negative    Indication:  Desired long-term, reversible contraception     History:      Talia Marin is a 25 y.o.  desires implant for contraception with Nexplanon.  Currently on menstrual cycle.  Pt has no major complaints today.       Consents:     We discussed the risks, benefits, indications, and alternatives to the Nexplanon including but not limited to risk of bleeding, infection, and scarring at insertion site and dysfunctional uterine bleeding.  All of her questions were answered to her satisfaction. Pt voiced understanding and written informed consents obtained.     Vitals:  Ht 5' 3" (1.6 m)   Wt 75.8 kg (167 lb)   BMI 29.58 kg/m²     Procedure Details:      A time out was performed to confirmed the correct patient and procedure.    Insertion site:  Left arm      Lot # [id number]    Expiration Date:  11/10/22     Insertion site was selected 8 - 10 cm from medial epicondyle overlying the triceps muscle and marked along with guiding site using sterile marker.    Procedure area was prepped and draped in a sterile fashion.    2 mL of 1% lidocaine with epinephrine was injected at the insertion site.    Nexplanon trocar was inserted subcutaneously and then Nexplanon capsule delivered subcutaneously.    Trocar was removed from the insertion site.    Nexplanon capsule was palpated by provider and patient to assure satisfactory placement.    A steri-strip and pressure dressing were applied.    Pt tolerated the procedure well.  Sterile technique was maintained.  Hemostasis noted.  VSSAF, pain scale 0/10 at end of procedure.    Complications:  None    Follow-up:     Standard post-procedure care is explained and return precautions are given.    Manage post Nexplanon placement pain with NSAIDS, Tylenol prn.    Pt was clearly " reminded that the Nexplanon will need to be removed within 3 years from date of insertion.    Return 4 weeks for Nexplanon check, or sooner for any concerns.  All questions answered, pt voiced understanding.  Go to ER for any emergencies.        George Robertson MD

## 2020-10-05 ENCOUNTER — PATIENT MESSAGE (OUTPATIENT)
Dept: ADMINISTRATIVE | Facility: HOSPITAL | Age: 25
End: 2020-10-05

## 2020-10-07 LAB
FINAL PATHOLOGIC DIAGNOSIS: NORMAL
Lab: NORMAL

## 2020-10-20 ENCOUNTER — OFFICE VISIT (OUTPATIENT)
Dept: FAMILY MEDICINE | Facility: CLINIC | Age: 25
End: 2020-10-20
Payer: MEDICAID

## 2020-10-20 ENCOUNTER — LAB VISIT (OUTPATIENT)
Dept: LAB | Facility: HOSPITAL | Age: 25
End: 2020-10-20
Attending: INTERNAL MEDICINE
Payer: MEDICAID

## 2020-10-20 VITALS
SYSTOLIC BLOOD PRESSURE: 110 MMHG | DIASTOLIC BLOOD PRESSURE: 70 MMHG | OXYGEN SATURATION: 99 % | RESPIRATION RATE: 16 BRPM | HEIGHT: 63 IN | BODY MASS INDEX: 29.92 KG/M2 | TEMPERATURE: 98 F | HEART RATE: 64 BPM | WEIGHT: 168.88 LBS

## 2020-10-20 DIAGNOSIS — Z00.00 ANNUAL PHYSICAL EXAM: ICD-10-CM

## 2020-10-20 DIAGNOSIS — Z00.00 ANNUAL PHYSICAL EXAM: Primary | ICD-10-CM

## 2020-10-20 LAB
ALBUMIN SERPL BCP-MCNC: 4.3 G/DL (ref 3.5–5.2)
ALP SERPL-CCNC: 74 U/L (ref 55–135)
ALT SERPL W/O P-5'-P-CCNC: 16 U/L (ref 10–44)
ANION GAP SERPL CALC-SCNC: 9 MMOL/L (ref 8–16)
AST SERPL-CCNC: 17 U/L (ref 10–40)
BASOPHILS # BLD AUTO: 0.03 K/UL (ref 0–0.2)
BASOPHILS NFR BLD: 0.4 % (ref 0–1.9)
BILIRUB SERPL-MCNC: 0.4 MG/DL (ref 0.1–1)
BUN SERPL-MCNC: 13 MG/DL (ref 6–20)
CALCIUM SERPL-MCNC: 9.1 MG/DL (ref 8.7–10.5)
CHLORIDE SERPL-SCNC: 108 MMOL/L (ref 95–110)
CHOLEST SERPL-MCNC: 142 MG/DL (ref 120–199)
CHOLEST/HDLC SERPL: 2.9 {RATIO} (ref 2–5)
CO2 SERPL-SCNC: 24 MMOL/L (ref 23–29)
CREAT SERPL-MCNC: 0.8 MG/DL (ref 0.5–1.4)
DIFFERENTIAL METHOD: ABNORMAL
EOSINOPHIL # BLD AUTO: 0.1 K/UL (ref 0–0.5)
EOSINOPHIL NFR BLD: 1.7 % (ref 0–8)
ERYTHROCYTE [DISTWIDTH] IN BLOOD BY AUTOMATED COUNT: 15.2 % (ref 11.5–14.5)
EST. GFR  (AFRICAN AMERICAN): >60 ML/MIN/1.73 M^2
EST. GFR  (NON AFRICAN AMERICAN): >60 ML/MIN/1.73 M^2
ESTIMATED AVG GLUCOSE: 100 MG/DL (ref 68–131)
GLUCOSE SERPL-MCNC: 96 MG/DL (ref 70–110)
HBA1C MFR BLD HPLC: 5.1 % (ref 4–5.6)
HCT VFR BLD AUTO: 35.7 % (ref 37–48.5)
HDLC SERPL-MCNC: 49 MG/DL (ref 40–75)
HDLC SERPL: 34.5 % (ref 20–50)
HGB BLD-MCNC: 11.5 G/DL (ref 12–16)
IMM GRANULOCYTES # BLD AUTO: 0.02 K/UL (ref 0–0.04)
IMM GRANULOCYTES NFR BLD AUTO: 0.3 % (ref 0–0.5)
LDLC SERPL CALC-MCNC: 83 MG/DL (ref 63–159)
LYMPHOCYTES # BLD AUTO: 2.7 K/UL (ref 1–4.8)
LYMPHOCYTES NFR BLD: 39 % (ref 18–48)
MCH RBC QN AUTO: 24.5 PG (ref 27–31)
MCHC RBC AUTO-ENTMCNC: 32.2 G/DL (ref 32–36)
MCV RBC AUTO: 76 FL (ref 82–98)
MONOCYTES # BLD AUTO: 0.4 K/UL (ref 0.3–1)
MONOCYTES NFR BLD: 5.9 % (ref 4–15)
NEUTROPHILS # BLD AUTO: 3.6 K/UL (ref 1.8–7.7)
NEUTROPHILS NFR BLD: 52.7 % (ref 38–73)
NONHDLC SERPL-MCNC: 93 MG/DL
NRBC BLD-RTO: 0 /100 WBC
PLATELET # BLD AUTO: 406 K/UL (ref 150–350)
PMV BLD AUTO: 9.7 FL (ref 9.2–12.9)
POTASSIUM SERPL-SCNC: 4.4 MMOL/L (ref 3.5–5.1)
PROT SERPL-MCNC: 7.8 G/DL (ref 6–8.4)
RBC # BLD AUTO: 4.69 M/UL (ref 4–5.4)
SODIUM SERPL-SCNC: 141 MMOL/L (ref 136–145)
TRIGL SERPL-MCNC: 50 MG/DL (ref 30–150)
TSH SERPL DL<=0.005 MIU/L-ACNC: 0.81 UIU/ML (ref 0.4–4)
WBC # BLD AUTO: 6.92 K/UL (ref 3.9–12.7)

## 2020-10-20 PROCEDURE — 99213 OFFICE O/P EST LOW 20 MIN: CPT | Mod: PBBFAC,PO | Performed by: INTERNAL MEDICINE

## 2020-10-20 PROCEDURE — 84443 ASSAY THYROID STIM HORMONE: CPT

## 2020-10-20 PROCEDURE — 80053 COMPREHEN METABOLIC PANEL: CPT

## 2020-10-20 PROCEDURE — 99999 PR PBB SHADOW E&M-EST. PATIENT-LVL III: ICD-10-PCS | Mod: PBBFAC,,, | Performed by: INTERNAL MEDICINE

## 2020-10-20 PROCEDURE — 85025 COMPLETE CBC W/AUTO DIFF WBC: CPT

## 2020-10-20 PROCEDURE — 36415 COLL VENOUS BLD VENIPUNCTURE: CPT

## 2020-10-20 PROCEDURE — 99395 PR PREVENTIVE VISIT,EST,18-39: ICD-10-PCS | Mod: S$PBB,,, | Performed by: INTERNAL MEDICINE

## 2020-10-20 PROCEDURE — 99395 PREV VISIT EST AGE 18-39: CPT | Mod: S$PBB,,, | Performed by: INTERNAL MEDICINE

## 2020-10-20 PROCEDURE — 80061 LIPID PANEL: CPT

## 2020-10-20 PROCEDURE — 83036 HEMOGLOBIN GLYCOSYLATED A1C: CPT

## 2020-10-20 PROCEDURE — 99999 PR PBB SHADOW E&M-EST. PATIENT-LVL III: CPT | Mod: PBBFAC,,, | Performed by: INTERNAL MEDICINE

## 2020-10-20 NOTE — PROGRESS NOTES
SUBJECTIVE     Chief Complaint   Patient presents with    Annual Exam       HPI  Talia Marin is a 25 y.o. female with multiple medical diagnoses as listed in the medical history and problem list that presents for annual exam. Pt has been doing well since her last visit. She has a good appetite and eats well. She does not exercise, but keeps physically active with her 4 children. She sleeps for ~4-5 hours nightly. Pt does take OTC supplements, which is a MVI. She does not have any current stressors. Pt is UTD on age appropriate CA screening.    PAST MEDICAL HISTORY:  Past Medical History:   Diagnosis Date    Anemia     History of blood transfusion 2014    After d&c in 2014 (post 16 week twin delivery)       PAST SURGICAL HISTORY:  Past Surgical History:   Procedure Laterality Date    DILATION AND CURETTAGE OF UTERUS         SOCIAL HISTORY:  Social History     Socioeconomic History    Marital status: Single     Spouse name: Not on file    Number of children: Not on file    Years of education: Not on file    Highest education level: Not on file   Occupational History    Not on file   Social Needs    Financial resource strain: Not very hard    Food insecurity     Worry: Never true     Inability: Never true    Transportation needs     Medical: No     Non-medical: No   Tobacco Use    Smoking status: Never Smoker    Smokeless tobacco: Never Used   Substance and Sexual Activity    Alcohol use: No     Frequency: Monthly or less     Drinks per session: 1 or 2     Binge frequency: Less than monthly    Drug use: No    Sexual activity: Yes     Partners: Male     Birth control/protection: None   Lifestyle    Physical activity     Days per week: 2 days     Minutes per session: 30 min    Stress: To some extent   Relationships    Social connections     Talks on phone: More than three times a week     Gets together: Once a week     Attends [employer] service: Not on file     Active member of club or  "organization: No     Attends meetings of clubs or organizations: Never     Relationship status: Patient refused   Other Topics Concern    Not on file   Social History Narrative    Not on file       FAMILY HISTORY:  Family History   Problem Relation Age of Onset    Hyperlipidemia Father     Hypertension Father     Arrhythmia Neg Hx     Cardiomyopathy Neg Hx     Congenital heart disease Neg Hx     Heart attacks under age 50 Neg Hx     Pacemaker/defibrilator Neg Hx        ALLERGIES AND MEDICATIONS: updated and reviewed.  Review of patient's allergies indicates:  No Known Allergies  Current Outpatient Medications   Medication Sig Dispense Refill    prenat.vits,deni,min-iron-folic (PRENATAL VITAMIN) Tab Take 1 tablet by mouth once daily. 30 each 11    ferrous sulfate (FEOSOL) 325 mg (65 mg iron) Tab tablet Take 1 tablet (325 mg total) by mouth 2 (two) times daily. (Patient not taking: Reported on 9/30/2020) 60 tablet 1     No current facility-administered medications for this visit.        ROS  Review of Systems   Constitutional: Negative for chills and fever.   HENT: Negative for hearing loss and sore throat.    Eyes: Negative for visual disturbance.   Respiratory: Negative for cough and shortness of breath.    Cardiovascular: Negative for chest pain, palpitations and leg swelling.   Gastrointestinal: Negative for abdominal pain, constipation, diarrhea, nausea and vomiting.   Genitourinary: Negative for dysuria, frequency and urgency.   Musculoskeletal: Negative for arthralgias, joint swelling and myalgias.   Skin: Negative for rash and wound.   Neurological: Negative for headaches.   Psychiatric/Behavioral: Negative for agitation and confusion. The patient is not nervous/anxious.          OBJECTIVE     Physical Exam  Vitals:    10/20/20 0855   BP: 110/70   Pulse: 64   Resp: 16   Temp: 98.4 °F (36.9 °C)    Body mass index is 29.91 kg/m².  Weight: 76.6 kg (168 lb 14 oz)   Height: 5' 3" (160 cm)     Physical " Exam  Constitutional:       General: She is not in acute distress.     Appearance: She is well-developed.   HENT:      Head: Normocephalic and atraumatic.      Right Ear: External ear normal.      Left Ear: External ear normal.      Nose: Nose normal.   Eyes:      General: No scleral icterus.        Right eye: No discharge.         Left eye: No discharge.      Conjunctiva/sclera: Conjunctivae normal.   Neck:      Musculoskeletal: Normal range of motion and neck supple.      Vascular: No JVD.      Trachea: No tracheal deviation.   Cardiovascular:      Rate and Rhythm: Normal rate and regular rhythm.      Heart sounds: No murmur. No friction rub. No gallop.    Pulmonary:      Effort: Pulmonary effort is normal. No respiratory distress.      Breath sounds: Normal breath sounds. No wheezing.   Abdominal:      General: Bowel sounds are normal. There is no distension.      Palpations: Abdomen is soft. There is no mass.      Tenderness: There is no abdominal tenderness. There is no guarding or rebound.   Musculoskeletal: Normal range of motion.         General: No tenderness or deformity.   Skin:     General: Skin is warm and dry.      Findings: No erythema or rash.   Neurological:      Mental Status: She is alert and oriented to person, place, and time.      Motor: No abnormal muscle tone.      Coordination: Coordination normal.   Psychiatric:         Behavior: Behavior normal.         Thought Content: Thought content normal.         Judgment: Judgment normal.           Health Maintenance       Date Due Completion Date    Lipid Panel 1997 ---    Influenza Vaccine (1) 08/01/2020 1/11/2019    Chlamydia Screening 03/03/2021 3/3/2020    Pap Smear 09/18/2023 9/18/2020    TETANUS VACCINE 01/11/2029 1/11/2019            ASSESSMENT     25 y.o. female with     1. Annual physical exam        PLAN:     1. Annual physical exam  - Counseled on age appropriate medical preventative services, including age appropriate cancer  screenings, over all nutritional health, need for a consistent exercise regimen and an over all push towards maintaining a vigorous and active lifestyle.  Counseled on age appropriate vaccines and discussed upcoming health care needs based on age/gender.  Spent time with patient counseling on need for a good patient/doctor relationship moving forward.  Discussed use of common OTC medications and supplements.  Discussed common dietary aids and use of caffeine and the need for good sleep hygiene and stress management.  - CBC auto differential; Future  - Comprehensive Metabolic Panel; Future  - Hemoglobin A1C; Future  - TSH; Future  - Lipid Panel; Future        RTC in 6 months     Yen Luque MD  10/20/2020 9:16 AM        No follow-ups on file.

## 2020-10-21 DIAGNOSIS — D50.9 MICROCYTIC ANEMIA: Primary | ICD-10-CM

## 2020-10-21 DIAGNOSIS — D75.839 THROMBOCYTOSIS: ICD-10-CM

## 2020-12-08 NOTE — LACTATION NOTE
03/29/19 1010   Maternal Assessment   Breast Density Bilateral:;soft   Areola Bilateral:;elastic   Nipples Bilateral:;everted   Maternal Infant Feeding   Maternal Emotional State assist needed;relaxed   Infant Positioning cradle   Signs of Milk Transfer audible swallow;infant jaw motion present   Pain with Feeding no   Latch Assistance yes   Baby very sleepy after receiving antibiotics-assistance to wake baby -placed skin to skin and moved to breast -baby gags at first latch and spits up large amount of mucous -allowed to recover and moved back to breast -demonstrated hand expression with mother to elicit suck reflex -baby latches on  and off then finally achieves deep sustained latch with sucking and swallows -mother encouraged to do breast compressions to keep baby actively sucking -encouraged to call for assistance today for feedings   
"   03/30/19 0820   Pain/Comfort/Sleep   Pain Body Location - Side Bilateral   Pain Body Location other (see comments)  (nipples)   Pain Rating (0-10): Activity 2   Breasts WDL   Breast WDL WDL   Maternal Feeding Assessment   Maternal Emotional State relaxed;independent   Latch Assistance no   Reproductive Interventions   Breastfeeding Support encouragement provided;lactation counseling provided     Independently breastfeeding well without complications.  Breasts filling now, not hard.  Mother's DC antinicpated today, will board with baby on antibiotics.  Breastfeeding discharge instructions given with review of Mother's Breastfeeding Guide and Resource List.  Encouraged to call hotline # prn.  States "understand" and verbalized appropriate recall.    "
patient alert ox4 came in c/o chest pain. NAD. skin warm and dry. breathing even and unlabored. labs done and meds given as ordered . awaiting results .

## 2021-08-02 ENCOUNTER — OFFICE VISIT (OUTPATIENT)
Dept: FAMILY MEDICINE | Facility: CLINIC | Age: 26
End: 2021-08-02
Payer: MEDICAID

## 2021-08-02 VITALS
HEIGHT: 63 IN | WEIGHT: 178 LBS | OXYGEN SATURATION: 98 % | BODY MASS INDEX: 31.54 KG/M2 | SYSTOLIC BLOOD PRESSURE: 110 MMHG | HEART RATE: 85 BPM | DIASTOLIC BLOOD PRESSURE: 80 MMHG

## 2021-08-02 DIAGNOSIS — Z00.00 ANNUAL PHYSICAL EXAM: Primary | ICD-10-CM

## 2021-08-02 PROCEDURE — 99213 OFFICE O/P EST LOW 20 MIN: CPT | Mod: PBBFAC,PO | Performed by: INTERNAL MEDICINE

## 2021-08-02 PROCEDURE — 99395 PR PREVENTIVE VISIT,EST,18-39: ICD-10-PCS | Mod: S$PBB,,, | Performed by: INTERNAL MEDICINE

## 2021-08-02 PROCEDURE — 99395 PREV VISIT EST AGE 18-39: CPT | Mod: S$PBB,,, | Performed by: INTERNAL MEDICINE

## 2021-08-02 PROCEDURE — 99999 PR PBB SHADOW E&M-EST. PATIENT-LVL III: ICD-10-PCS | Mod: PBBFAC,,, | Performed by: INTERNAL MEDICINE

## 2021-08-02 PROCEDURE — 99999 PR PBB SHADOW E&M-EST. PATIENT-LVL III: CPT | Mod: PBBFAC,,, | Performed by: INTERNAL MEDICINE

## 2021-08-16 NOTE — NURSING
Here is a list of  dental clinics that may be able to help you  Keep in mind that these clinics do not have to see you or any other patient  Also, these clinics are not connected to the [facility] or [address] [place] [address] system but if they agree to see you as a patient it is easy for them to call  Medical Records Department to have your records faxed to them  [facility]:  [address] [facility] [address] [facility] [address] [address]   6[address] [facility]   [id number]:   400 [facility]   [address] [facility], 210 [facility]   [phone]     [facility]:  [address] [facility], 1000 [facility]  [phone]    [address]:  [address] [place], 76 [facility]  [phone]    [address] [facility] [address]:  45 [facility], [address]  ([place]:  [address] [facility], 13[address] [facility]  [phone]    [facility]:  [address] [facility], 520 [facility]  [phone]    [address] [address]:  [address] [facility] 74  [phone]    [address]:  [address]  [address]  [phone] [facility]7 [facility]  [address]   [address]   [phone]    [address] [place] [address] Street:  14[address] [facility], [address]  21  Associates:  [address], 50[address] [facility],[address]  21    [address] [address]   8[address] [facility]   [address]   8614 [facility], 210 [facility]   [phone] Pt moved to bed w/o diff.  Pt still wobbly in knees.  Able to feel feet touching floor.  Unable to locate fundus.  L&D nurse put pt on bedpan; unable to void.  Bladder scan performed by L&D nurse revealed greater than 500cc.  I&O catheter per L&D nurse, 900cc urine obtained.

## 2021-08-19 ENCOUNTER — OFFICE VISIT (OUTPATIENT)
Dept: OBSTETRICS AND GYNECOLOGY | Facility: CLINIC | Age: 26
End: 2021-08-19
Payer: MEDICAID

## 2021-08-19 VITALS
BODY MASS INDEX: 31.61 KG/M2 | WEIGHT: 178.38 LBS | SYSTOLIC BLOOD PRESSURE: 102 MMHG | HEIGHT: 63 IN | DIASTOLIC BLOOD PRESSURE: 66 MMHG

## 2021-08-19 DIAGNOSIS — N76.0 VULVOVAGINITIS: ICD-10-CM

## 2021-08-19 DIAGNOSIS — Z01.419 WELL WOMAN EXAM WITH ROUTINE GYNECOLOGICAL EXAM: Primary | ICD-10-CM

## 2021-08-19 DIAGNOSIS — Z97.5 NEXPLANON IN PLACE: ICD-10-CM

## 2021-08-19 PROCEDURE — 99395 PR PREVENTIVE VISIT,EST,18-39: ICD-10-PCS | Mod: S$PBB,,, | Performed by: OBSTETRICS & GYNECOLOGY

## 2021-08-19 PROCEDURE — 99999 PR PBB SHADOW E&M-EST. PATIENT-LVL III: CPT | Mod: PBBFAC,,, | Performed by: OBSTETRICS & GYNECOLOGY

## 2021-08-19 PROCEDURE — 99213 OFFICE O/P EST LOW 20 MIN: CPT | Mod: PBBFAC | Performed by: OBSTETRICS & GYNECOLOGY

## 2021-08-19 PROCEDURE — 99395 PREV VISIT EST AGE 18-39: CPT | Mod: S$PBB,,, | Performed by: OBSTETRICS & GYNECOLOGY

## 2021-08-19 PROCEDURE — 99999 PR PBB SHADOW E&M-EST. PATIENT-LVL III: ICD-10-PCS | Mod: PBBFAC,,, | Performed by: OBSTETRICS & GYNECOLOGY

## 2021-08-19 RX ORDER — NYSTATIN AND TRIAMCINOLONE ACETONIDE 100000; 1 [USP'U]/G; MG/G
CREAM TOPICAL
Qty: 30 G | Refills: 1 | Status: SHIPPED | OUTPATIENT
Start: 2021-08-19 | End: 2023-07-10 | Stop reason: SDUPTHER

## 2021-08-19 RX ORDER — FLUCONAZOLE 150 MG/1
150 TABLET ORAL
Qty: 6 TABLET | Refills: 0 | Status: SHIPPED | OUTPATIENT
Start: 2021-08-19 | End: 2023-07-10 | Stop reason: SDUPTHER

## 2021-09-29 ENCOUNTER — TELEPHONE (OUTPATIENT)
Dept: FAMILY MEDICINE | Facility: CLINIC | Age: 26
End: 2021-09-29
Payer: MEDICAID

## 2021-09-29 NOTE — TELEPHONE ENCOUNTER
----- Message from Aide Hernandez sent at 9/28/2021  4:53 PM CDT -----  Type:  Patient Call Back    Who Called:Talia     What is the reqeust in detail: Pt is requesting a call back in regards to an appt, pt has been feeling dizzy, she said she took an at home pregnancy test and it can back negative so she isnt sure what is causing it, the next available appt was until January next year and she would like to be seen before then. Please Advise      Can the clinic reply by [facility]?    Best Call Back Number: [phone]

## 2022-04-25 ENCOUNTER — LAB VISIT (OUTPATIENT)
Dept: LAB | Facility: HOSPITAL | Age: 27
End: 2022-04-25
Attending: INTERNAL MEDICINE
Payer: MEDICAID

## 2022-04-25 DIAGNOSIS — Z00.00 ANNUAL PHYSICAL EXAM: ICD-10-CM

## 2022-04-25 LAB
ALBUMIN SERPL BCP-MCNC: 3.8 G/DL (ref 3.5–5.2)
ALP SERPL-CCNC: 60 U/L (ref 55–135)
ALT SERPL W/O P-5'-P-CCNC: 14 U/L (ref 10–44)
ANION GAP SERPL CALC-SCNC: 7 MMOL/L (ref 8–16)
AST SERPL-CCNC: 15 U/L (ref 10–40)
BASOPHILS # BLD AUTO: 0.03 K/UL (ref 0–0.2)
BASOPHILS NFR BLD: 0.4 % (ref 0–1.9)
BILIRUB SERPL-MCNC: 0.5 MG/DL (ref 0.1–1)
BUN SERPL-MCNC: 10 MG/DL (ref 6–20)
CALCIUM SERPL-MCNC: 8.8 MG/DL (ref 8.7–10.5)
CHLORIDE SERPL-SCNC: 108 MMOL/L (ref 95–110)
CHOLEST SERPL-MCNC: 207 MG/DL (ref 120–199)
CHOLEST/HDLC SERPL: 3.8 {RATIO} (ref 2–5)
CO2 SERPL-SCNC: 24 MMOL/L (ref 23–29)
CREAT SERPL-MCNC: 0.8 MG/DL (ref 0.5–1.4)
DIFFERENTIAL METHOD: NORMAL
EOSINOPHIL # BLD AUTO: 0.1 K/UL (ref 0–0.5)
EOSINOPHIL NFR BLD: 1.1 % (ref 0–8)
ERYTHROCYTE [DISTWIDTH] IN BLOOD BY AUTOMATED COUNT: 12.3 % (ref 11.5–14.5)
EST. GFR  (AFRICAN AMERICAN): >60 ML/MIN/1.73 M^2
EST. GFR  (NON AFRICAN AMERICAN): >60 ML/MIN/1.73 M^2
ESTIMATED AVG GLUCOSE: 103 MG/DL (ref 68–131)
GLUCOSE SERPL-MCNC: 103 MG/DL (ref 70–110)
HBA1C MFR BLD: 5.2 % (ref 4–5.6)
HCT VFR BLD AUTO: 40.2 % (ref 37–48.5)
HDLC SERPL-MCNC: 55 MG/DL (ref 40–75)
HDLC SERPL: 26.6 % (ref 20–50)
HGB BLD-MCNC: 13.4 G/DL (ref 12–16)
IMM GRANULOCYTES # BLD AUTO: 0.03 K/UL (ref 0–0.04)
IMM GRANULOCYTES NFR BLD AUTO: 0.4 % (ref 0–0.5)
LDLC SERPL CALC-MCNC: 129.6 MG/DL (ref 63–159)
LYMPHOCYTES # BLD AUTO: 2.8 K/UL (ref 1–4.8)
LYMPHOCYTES NFR BLD: 34.7 % (ref 18–48)
MCH RBC QN AUTO: 29.7 PG (ref 27–31)
MCHC RBC AUTO-ENTMCNC: 33.3 G/DL (ref 32–36)
MCV RBC AUTO: 89 FL (ref 82–98)
MONOCYTES # BLD AUTO: 0.6 K/UL (ref 0.3–1)
MONOCYTES NFR BLD: 7 % (ref 4–15)
NEUTROPHILS # BLD AUTO: 4.6 K/UL (ref 1.8–7.7)
NEUTROPHILS NFR BLD: 56.4 % (ref 38–73)
NONHDLC SERPL-MCNC: 152 MG/DL
NRBC BLD-RTO: 0 /100 WBC
PLATELET # BLD AUTO: 340 K/UL (ref 150–450)
PMV BLD AUTO: 9.2 FL (ref 9.2–12.9)
POTASSIUM SERPL-SCNC: 4.4 MMOL/L (ref 3.5–5.1)
PROT SERPL-MCNC: 7.5 G/DL (ref 6–8.4)
RBC # BLD AUTO: 4.51 M/UL (ref 4–5.4)
SODIUM SERPL-SCNC: 139 MMOL/L (ref 136–145)
TRIGL SERPL-MCNC: 112 MG/DL (ref 30–150)
TSH SERPL DL<=0.005 MIU/L-ACNC: 1.33 UIU/ML (ref 0.4–4)
WBC # BLD AUTO: 8.13 K/UL (ref 3.9–12.7)

## 2022-04-25 PROCEDURE — 80053 COMPREHEN METABOLIC PANEL: CPT | Performed by: INTERNAL MEDICINE

## 2022-04-25 PROCEDURE — 84443 ASSAY THYROID STIM HORMONE: CPT | Performed by: INTERNAL MEDICINE

## 2022-04-25 PROCEDURE — 83036 HEMOGLOBIN GLYCOSYLATED A1C: CPT | Performed by: INTERNAL MEDICINE

## 2022-04-25 PROCEDURE — 36415 COLL VENOUS BLD VENIPUNCTURE: CPT | Performed by: INTERNAL MEDICINE

## 2022-04-25 PROCEDURE — 80061 LIPID PANEL: CPT | Performed by: INTERNAL MEDICINE

## 2022-04-25 PROCEDURE — 85025 COMPLETE CBC W/AUTO DIFF WBC: CPT | Performed by: INTERNAL MEDICINE

## 2023-07-10 ENCOUNTER — OFFICE VISIT (OUTPATIENT)
Dept: OBSTETRICS AND GYNECOLOGY | Facility: CLINIC | Age: 28
End: 2023-07-10
Payer: MEDICAID

## 2023-07-10 VITALS
WEIGHT: 195.31 LBS | SYSTOLIC BLOOD PRESSURE: 138 MMHG | DIASTOLIC BLOOD PRESSURE: 70 MMHG | HEIGHT: 63 IN | BODY MASS INDEX: 34.61 KG/M2

## 2023-07-10 DIAGNOSIS — N76.0 VULVOVAGINITIS: ICD-10-CM

## 2023-07-10 DIAGNOSIS — Z30.09 FAMILY PLANNING: ICD-10-CM

## 2023-07-10 DIAGNOSIS — Z11.3 SCREEN FOR SEXUALLY TRANSMITTED DISEASES: ICD-10-CM

## 2023-07-10 DIAGNOSIS — Z01.419 WELL WOMAN EXAM WITH ROUTINE GYNECOLOGICAL EXAM: Primary | ICD-10-CM

## 2023-07-10 DIAGNOSIS — Z12.4 CERVICAL CANCER SCREENING: ICD-10-CM

## 2023-07-10 PROCEDURE — 87591 N.GONORRHOEAE DNA AMP PROB: CPT | Performed by: OBSTETRICS & GYNECOLOGY

## 2023-07-10 PROCEDURE — 88175 CYTOPATH C/V AUTO FLUID REDO: CPT | Performed by: OBSTETRICS & GYNECOLOGY

## 2023-07-10 PROCEDURE — 99395 PREV VISIT EST AGE 18-39: CPT | Mod: S$PBB,,, | Performed by: OBSTETRICS & GYNECOLOGY

## 2023-07-10 PROCEDURE — 99999 PR PBB SHADOW E&M-EST. PATIENT-LVL III: CPT | Mod: PBBFAC,,, | Performed by: OBSTETRICS & GYNECOLOGY

## 2023-07-10 PROCEDURE — 3008F PR BODY MASS INDEX (BMI) DOCUMENTED: ICD-10-PCS | Mod: CPTII,,, | Performed by: OBSTETRICS & GYNECOLOGY

## 2023-07-10 PROCEDURE — 3078F DIAST BP <80 MM HG: CPT | Mod: CPTII,,, | Performed by: OBSTETRICS & GYNECOLOGY

## 2023-07-10 PROCEDURE — 1159F MED LIST DOCD IN RCRD: CPT | Mod: CPTII,,, | Performed by: OBSTETRICS & GYNECOLOGY

## 2023-07-10 PROCEDURE — 99395 PR PREVENTIVE VISIT,EST,18-39: ICD-10-PCS | Mod: S$PBB,,, | Performed by: OBSTETRICS & GYNECOLOGY

## 2023-07-10 PROCEDURE — 3075F SYST BP GE 130 - 139MM HG: CPT | Mod: CPTII,,, | Performed by: OBSTETRICS & GYNECOLOGY

## 2023-07-10 PROCEDURE — 1160F PR REVIEW ALL MEDS BY PRESCRIBER/CLIN PHARMACIST DOCUMENTED: ICD-10-PCS | Mod: CPTII,,, | Performed by: OBSTETRICS & GYNECOLOGY

## 2023-07-10 PROCEDURE — 3008F BODY MASS INDEX DOCD: CPT | Mod: CPTII,,, | Performed by: OBSTETRICS & GYNECOLOGY

## 2023-07-10 PROCEDURE — 3078F PR MOST RECENT DIASTOLIC BLOOD PRESSURE < 80 MM HG: ICD-10-PCS | Mod: CPTII,,, | Performed by: OBSTETRICS & GYNECOLOGY

## 2023-07-10 PROCEDURE — 3075F PR MOST RECENT SYSTOLIC BLOOD PRESS GE 130-139MM HG: ICD-10-PCS | Mod: CPTII,,, | Performed by: OBSTETRICS & GYNECOLOGY

## 2023-07-10 PROCEDURE — 1160F RVW MEDS BY RX/DR IN RCRD: CPT | Mod: CPTII,,, | Performed by: OBSTETRICS & GYNECOLOGY

## 2023-07-10 PROCEDURE — 99999 PR PBB SHADOW E&M-EST. PATIENT-LVL III: ICD-10-PCS | Mod: PBBFAC,,, | Performed by: OBSTETRICS & GYNECOLOGY

## 2023-07-10 PROCEDURE — 1159F PR MEDICATION LIST DOCUMENTED IN MEDICAL RECORD: ICD-10-PCS | Mod: CPTII,,, | Performed by: OBSTETRICS & GYNECOLOGY

## 2023-07-10 PROCEDURE — 99213 OFFICE O/P EST LOW 20 MIN: CPT | Mod: PBBFAC | Performed by: OBSTETRICS & GYNECOLOGY

## 2023-07-10 RX ORDER — FLUCONAZOLE 150 MG/1
150 TABLET ORAL
Qty: 6 TABLET | Refills: 0 | Status: SHIPPED | OUTPATIENT
Start: 2023-07-10

## 2023-07-10 RX ORDER — NYSTATIN AND TRIAMCINOLONE ACETONIDE 100000; 1 [USP'U]/G; MG/G
CREAM TOPICAL
Qty: 30 G | Refills: 1 | Status: SHIPPED | OUTPATIENT
Start: 2023-07-10 | End: 2024-07-09

## 2023-07-10 NOTE — PROGRESS NOTES
"[facility]  Ambulatory Clinic  Obstetrics & Gynecology    Visit Date:  7/10/2023    Chief Complaint:  Annual GYN exam    History of Present Illness:      Talia Marin is a 28 y.o.  here for a gynecologic exam.      Menses are regular, not heavy or painful.    Pt current method of family planning is Nexplanon, and reports no problems with this method.      Nexplanon was placed 2023.    Pt is requesting copper IUD.    Last pap ~2020 was benign.    Pt is a non-smoker.    Pt denies abnormal vaginal bleeding, dysmenorrhea, dyspareunia, pelvic pain, bloating, early satiety, unintentional weight loss, breast mass/skin changes, incontinence, GI or urinary complaints.      Otherwise, the pt is in her usual state of health.    Past History:  Gynecologic history as noted above.    Review of Systems:      GENERAL:  No fever, fatigue, excessive weight gain or loss  HEENT:  No headaches, hearing changes, visual disturbance  RESPIRATORY:  No cough, shortness of breath  CARDIOVASCULAR:  No chest pain, heart palpitations, leg swelling  BREAST:  No lump, pain, nipple discharge, skin changes  GASTROINTESTINAL:  No nausea, vomiting, constipation, diarrhea, abd pain, rectal bleeding   GENITOURINARY:  See HPI  ENDOCRINE:  No heat or cold intolerance  HEMATOLOGIC:  No easy bruisability or bleeding   LYMPHATICS:  No enlarged nodes  MUSCULOSKELETAL:  No acute joint pain or swelling  SKIN:  No rash, lesions, jaundice  NEUROLOGIC:  No dizziness, weakness, syncope  PSYCHIATRIC:  No significant mood changes, homicidal/suicidal ideations, abuse    Physical Exam:     /70   Ht 5' 3" (1.6 m)   Wt 88.6 kg (195 lb 5.2 oz)   LMP 2023   BMI 34.60 kg/m²   Pulse 50's, Resp rate 14     GENERAL:  No acute distress, well-nourished  HEENT:  Atraumatic, anicteric, moist mucus membranes. Neck supple w/o masses.  BREAST:  Symmetric, nontender, no obvious masses, adenopathy, skin changes or nipple " discharge.  LUNGS:  Clear normal respiratory effort  HEART:  Regular rate and rhythm, no murmurs, gallops, or rubs  ABDOMEN:  Soft, non-tender, non-distended, normoactive bowel sounds, no obvious organomegaly  EXT:  Symmetric w/o cramping, claudication, or edema. +2 distal pulses.  SKIN:  No rashes or bruising  PSYCH:  Mood and affect appropriate  NEURO:  Grossly intact bilaterally    GENITOURINARY:    VULVAR:  Female external genitalia w/o obvious lesions. Female hair distribution. Normal urethral meatus. No gross lymphadenopathy.    VAGINA:  Normal vaginal mucosa. Good support. No obvious lesion. Mild yeast discharge.  CERVIX:  No cervical motion tenderness, discharge, or obvious lesions.   UTERUS:  Small, non-tender, normal contour  ADNEXA:  No masses, non-tender    RECTAL:  Deferred. No obvious external lesions    Chaperone present for exam.    Assessment:     28 y.o.  with Nexplanon:    Well woman gynecologic exam  Family planning - order copper IUD, Nexplanon expiring 23  Vaginal yeast infection    Plan:    A gynecologic health assessment was performed with age appropriate counseling.    Cervical cancer screening - pap obtained.    STI screening - pt requested gonorrhea & chlamydia testing.  Safe sex discussed.      Diflucan for yeast infection.  If unresolved, use monistat 7.  Hygiene advice.    Discuss contraceptive options.  Pt is requesting copper IUD.  Risks, benefits, and alternatives to IUD reviewed.  Will schedule pt for IUD insertion on received pending insurance verification.        Encourage healthy lifestyle modifications, monthly self breast exams, and f/u with PCP for health maintenance.    Pt will call clinic to schedule her IUD insertion once IUD received.    Return sooner as needed.  All questions answered, pt voiced understanding.        George Robertson MD

## 2023-07-11 LAB
C TRACH DNA SPEC QL NAA+PROBE: NOT DETECTED
N GONORRHOEA DNA SPEC QL NAA+PROBE: NOT DETECTED

## 2023-07-16 LAB
FINAL PATHOLOGIC DIAGNOSIS: NORMAL
Lab: NORMAL

## 2023-08-01 PROBLEM — M54.50 CHRONIC MIDLINE LOW BACK PAIN WITHOUT SCIATICA: Status: ACTIVE | Noted: 2023-08-01

## 2023-08-01 PROBLEM — G89.29 CHRONIC MIDLINE LOW BACK PAIN WITHOUT SCIATICA: Status: ACTIVE | Noted: 2023-08-01

## 2023-08-02 ENCOUNTER — HOSPITAL ENCOUNTER (OUTPATIENT)
Dept: RADIOLOGY | Facility: HOSPITAL | Age: 28
Discharge: HOME | End: 2023-08-02
Attending: INTERNAL MEDICINE
Payer: MEDICAID

## 2023-08-02 DIAGNOSIS — M54.50 CHRONIC MIDLINE LOW BACK PAIN WITHOUT SCIATICA: ICD-10-CM

## 2023-08-02 DIAGNOSIS — G89.29 CHRONIC MIDLINE LOW BACK PAIN WITHOUT SCIATICA: ICD-10-CM

## 2023-08-02 PROCEDURE — 72100 X-RAY EXAM L-S SPINE 2/3 VWS: CPT | Mod: 26,,, | Performed by: RADIOLOGY

## 2023-08-02 PROCEDURE — 72100 X-RAY EXAM L-S SPINE 2/3 VWS: CPT | Mod: TC,FY

## 2023-08-02 PROCEDURE — 72100 XR LUMBAR SPINE AP AND LATERAL: ICD-10-PCS | Mod: 26,,, | Performed by: RADIOLOGY

## 2023-08-03 DIAGNOSIS — M54.50 CHRONIC MIDLINE LOW BACK PAIN WITHOUT SCIATICA: Primary | ICD-10-CM

## 2023-08-03 DIAGNOSIS — G89.29 CHRONIC MIDLINE LOW BACK PAIN WITHOUT SCIATICA: Primary | ICD-10-CM

## 2023-08-16 ENCOUNTER — TELEPHONE (OUTPATIENT)
Dept: FAMILY MEDICINE | Facility: CLINIC | Age: 28
End: 2023-08-16
Payer: MEDICAID

## 2023-09-18 ENCOUNTER — TELEPHONE (OUTPATIENT)
Dept: FAMILY MEDICINE | Facility: CLINIC | Age: 28
End: 2023-09-18
Payer: MEDICAID

## 2023-09-28 ENCOUNTER — PROCEDURE VISIT (OUTPATIENT)
Dept: OBSTETRICS AND GYNECOLOGY | Facility: CLINIC | Age: 28
End: 2023-09-28
Payer: MEDICAID

## 2023-09-28 VITALS — SYSTOLIC BLOOD PRESSURE: 112 MMHG | WEIGHT: 197 LBS | DIASTOLIC BLOOD PRESSURE: 70 MMHG | BODY MASS INDEX: 34.89 KG/M2

## 2023-09-28 DIAGNOSIS — Z30.430 ENCOUNTER FOR INSERTION OF COPPER IUD: Primary | ICD-10-CM

## 2023-09-28 LAB
B-HCG UR QL: NEGATIVE
CTP QC/QA: YES

## 2023-09-28 PROCEDURE — 99999PBSHW POCT URINE PREGNANCY: ICD-10-PCS | Mod: PBBFAC,,,

## 2023-09-28 PROCEDURE — 81025 URINE PREGNANCY TEST: CPT | Mod: PBBFAC | Performed by: OBSTETRICS & GYNECOLOGY

## 2023-09-28 PROCEDURE — 99499 UNLISTED E&M SERVICE: CPT | Mod: S$PBB,,, | Performed by: OBSTETRICS & GYNECOLOGY

## 2023-09-28 PROCEDURE — 58300 INSERT INTRAUTERINE DEVICE: CPT | Mod: PBBFAC | Performed by: OBSTETRICS & GYNECOLOGY

## 2023-09-28 PROCEDURE — 58300 INSERT INTRAUTERINE DEVICE: CPT | Mod: S$PBB,,, | Performed by: OBSTETRICS & GYNECOLOGY

## 2023-09-28 PROCEDURE — 99999PBSHW POCT URINE PREGNANCY: Mod: PBBFAC,,,

## 2023-09-28 PROCEDURE — 58300 PR INSERT INTRAUTERINE DEVICE: ICD-10-PCS | Mod: S$PBB,,, | Performed by: OBSTETRICS & GYNECOLOGY

## 2023-09-28 PROCEDURE — 99499 NO LOS: ICD-10-PCS | Mod: S$PBB,,, | Performed by: OBSTETRICS & GYNECOLOGY

## 2023-09-28 PROCEDURE — 99999PBSHW PR PBB SHADOW TECHNICAL ONLY FILED TO HB: Mod: PBBFAC,,,

## 2023-09-28 NOTE — PROCEDURES
[facility]  Ambulatory Clinic   Obstetrics & Gynecology    Date:  2023    Procedure:  ParaGARD IUD insertion (CPT 82500)    LMP:  Patient's last menstrual period was 2023.    UPT:  Negative    Indication:  IUD contraception    History:      Talia Marin is a 28 y.o. , here for ParaGARD IUD insertion.  Pt has no major complaints today.      Consents:     We discussed the risks, benefits, indications, and alternatives to IUD use. She understands that with IUD insertion there is a risk of bleeding, infection, uterine perforation, and expulsion of device. All of her questions were answered to her satisfaction. Pt voiced understanding, Informed consents obtained.     Vitals:  /70   Wt 89.4 kg (196 lb 15.7 oz)   LMP 2023   Breastfeeding No   BMI 34.89 kg/m²     Physical Exam:      Abdomen soft, non-tender, no masses. External genitalia, vaginal wall and cervix without gross abnormality.  Bimanual exam reveals a small week size, non-tender, mid-plain uterus, without adnexal mass or tenderness.     Procedure Details:      A time out was performed to confirmed the correct patient and procedure.    The cervix was visualized with a speculum.     Cervix was cleaned with betadine.      A single tooth tenaculum was placed on the anterior lip of the cervix.     The uterus sounds to ~7 cm using sterile technique.     The IUD was loaded and placed high in the uterine fundus without difficulty using sterile technique.     The string was then cut with a ~3 cm tail.    The tenaculum and speculum were removed.      The patient tolerated the procedure well.  Sterile technique was maintained.  Hemostasis noted.  VSSAF, pain scale 0/10 at end of procedure.    Assessment:      Encounter for insertion of intrauterine contraceptive device (ParaGARD (NDC 04478-485-70), Lot: [id number] EXP: 2029)    Plan:      Post IUD placement counseling and precautions discussed.    Manage post IUD  placement pain with NSAIDS, Tylenol prn.    Pt advised on how to check for IUD strings.    Pt was reminded that the ParaGARD IUD will need to be removed within 10 years from date of insertion.    Return 4 weeks for IUD check and Nexplanon removal, or sooner for any concerns.  All questions answered, pt voiced understanding.        George Robertson MD

## 2023-10-25 ENCOUNTER — TELEPHONE (OUTPATIENT)
Dept: OBSTETRICS AND GYNECOLOGY | Facility: CLINIC | Age: 28
End: 2023-10-25
Payer: MEDICAID

## 2023-10-25 NOTE — TELEPHONE ENCOUNTER
On call back, pt was advised ok to come in at 1:30. Arrival time noted on appt      ----- Message from Sujatha Oro sent at 10/25/2023  3:44 PM CDT -----  Regarding: patient call back  Type: Patient Call Back    Who called: Self     What is the request in detail: Asked for a call back to see if she could get her procedure RS to either 1:30 pm. If not, then she said that she will keep what she has.     Can the clinic reply by [facility]? No     Would the patient rather a call back or a response via [facility]? Call     Best call back number: .[phone]

## 2023-10-26 ENCOUNTER — PROCEDURE VISIT (OUTPATIENT)
Dept: OBSTETRICS AND GYNECOLOGY | Facility: CLINIC | Age: 28
End: 2023-10-26
Payer: MEDICAID

## 2023-10-26 VITALS
WEIGHT: 193.13 LBS | HEIGHT: 63 IN | DIASTOLIC BLOOD PRESSURE: 64 MMHG | BODY MASS INDEX: 34.22 KG/M2 | SYSTOLIC BLOOD PRESSURE: 110 MMHG

## 2023-10-26 DIAGNOSIS — Z30.46 ENCOUNTER FOR NEXPLANON REMOVAL: Primary | ICD-10-CM

## 2023-10-26 PROCEDURE — 11982 PR REMOVAL DRUG IMPLANT DEVICE: ICD-10-PCS | Mod: S$PBB,,, | Performed by: OBSTETRICS & GYNECOLOGY

## 2023-10-26 PROCEDURE — 99499 NO LOS: ICD-10-PCS | Mod: S$PBB,,, | Performed by: OBSTETRICS & GYNECOLOGY

## 2023-10-26 PROCEDURE — 11982 REMOVE DRUG IMPLANT DEVICE: CPT | Mod: PBBFAC | Performed by: OBSTETRICS & GYNECOLOGY

## 2023-10-26 PROCEDURE — 11982 REMOVE DRUG IMPLANT DEVICE: CPT | Mod: S$PBB,,, | Performed by: OBSTETRICS & GYNECOLOGY

## 2023-10-26 PROCEDURE — 99499 UNLISTED E&M SERVICE: CPT | Mod: S$PBB,,, | Performed by: OBSTETRICS & GYNECOLOGY

## 2023-10-26 NOTE — PROCEDURES
"[facility]  Ambulatory Clinic   Obstetrics & Gynecology    Date:  10/26/2023    Procedure:  Nexplanon removal (CPT 37284)    LMP:  Patient's last menstrual period was 2023.    UPT:  Negative    Indication:  Change in birth control method    History:  Talia Marin is a 28 y.o. , here for Nexplanon removal.  She has no major complaints today.   We discussed the risks, benefits, alternatives, and possible complications to Nexplanon removal and all indicated procedures in detail.  All of her questions were answered to her satisfaction.  She voiced understanding and informed consent was obtained.    Vitals:  /64   Ht 5' 3" (1.6 m)   Wt 87.6 kg (193 lb 2 oz)   LMP 2023 Comment: pt has IUD  BMI 34.21 kg/m²     Procedure Details:      A time out was performed to confirmed the correct patient and procedure.      The procedure site was prepped with betadine.      Nexplanon was palpated in good position along the medial aspect of left arm between biceps and triceps.    2 mL of 1% lidocaine with epinephrine was injected at the site of incision near the tip of Nexplanon is closest to the elbow.    A 2-3 mm incision in the longitudinal direction of the arm at the tip of the implant closest to the elbow was made with a scalpel.      The Nexplanon was pushed toward the incision until the tip is visible and grasped curved mosquito forceps and gently pulled out without difficulty.    The incision was re-approximated with steri-strips and pressure bandage with sterile gauze was applied.    Patient tolerated the procedure well.  Sterile technique maintained.  Hemostasis noted.  VSSAF, pain scale 0/10 at end of procedure.    Impression:  Intact Nexplanon    Plan:      Usual post procedure warnings and aftercare instructions reviewed.  Pt was advised to call for any fever or prolonged or severe pain, bleeding, or swelling.  She was also advised to use OTC analgesics as needed for " mild to moderate pain.      Continue Copper IUD.  Risks, benefits, and alternatives to Copper reviewed.  Pt advised Copper IUD will need to be removed 10 yrs from insertion date.  Advised on self string check.  Discussed safe sex.    All of her questions were answered to her satisfaction, pt voiced understanding.     Follow-up:  11/2023 for annual GYN, or sooner for any concerns.       George Robertson MD

## 2024-08-06 ENCOUNTER — OFFICE VISIT (OUTPATIENT)
Dept: FAMILY MEDICINE | Facility: CLINIC | Age: 29
End: 2024-08-06
Payer: COMMERCIAL

## 2024-08-06 VITALS
BODY MASS INDEX: 36.88 KG/M2 | SYSTOLIC BLOOD PRESSURE: 128 MMHG | WEIGHT: 208.13 LBS | RESPIRATION RATE: 17 BRPM | OXYGEN SATURATION: 97 % | HEART RATE: 111 BPM | TEMPERATURE: 98 F | DIASTOLIC BLOOD PRESSURE: 78 MMHG | HEIGHT: 63 IN

## 2024-08-06 DIAGNOSIS — A37.90 PERTUSSIS-LIKE SYNDROME: Primary | ICD-10-CM

## 2024-08-06 PROCEDURE — 3008F BODY MASS INDEX DOCD: CPT | Mod: CPTII,S$GLB,, | Performed by: NURSE PRACTITIONER

## 2024-08-06 PROCEDURE — 1159F MED LIST DOCD IN RCRD: CPT | Mod: CPTII,S$GLB,, | Performed by: NURSE PRACTITIONER

## 2024-08-06 PROCEDURE — 3078F DIAST BP <80 MM HG: CPT | Mod: CPTII,S$GLB,, | Performed by: NURSE PRACTITIONER

## 2024-08-06 PROCEDURE — 1160F RVW MEDS BY RX/DR IN RCRD: CPT | Mod: CPTII,S$GLB,, | Performed by: NURSE PRACTITIONER

## 2024-08-06 PROCEDURE — 99999 PR PBB SHADOW E&M-EST. PATIENT-LVL III: CPT | Mod: PBBFAC,,, | Performed by: NURSE PRACTITIONER

## 2024-08-06 PROCEDURE — 99214 OFFICE O/P EST MOD 30 MIN: CPT | Mod: S$GLB,,, | Performed by: NURSE PRACTITIONER

## 2024-08-06 PROCEDURE — 3074F SYST BP LT 130 MM HG: CPT | Mod: CPTII,S$GLB,, | Performed by: NURSE PRACTITIONER

## 2024-08-06 RX ORDER — AZITHROMYCIN 500 MG/1
500 TABLET, FILM COATED ORAL DAILY
Qty: 5 TABLET | Refills: 0 | Status: SHIPPED | OUTPATIENT
Start: 2024-08-06

## 2024-08-06 RX ORDER — ONDANSETRON HYDROCHLORIDE 8 MG/1
8 TABLET, FILM COATED ORAL EVERY 8 HOURS PRN
Qty: 30 TABLET | Refills: 0 | Status: SHIPPED | OUTPATIENT
Start: 2024-08-06

## 2024-08-06 RX ORDER — BROMPHENIRAMINE MALEATE, PSEUDOEPHEDRINE HYDROCHLORIDE, AND DEXTROMETHORPHAN HYDROBROMIDE 2; 30; 10 MG/5ML; MG/5ML; MG/5ML
5 SYRUP ORAL
Qty: 240 ML | Refills: 0 | Status: SHIPPED | OUTPATIENT
Start: 2024-08-06

## 2024-08-06 RX ORDER — PREDNISONE 50 MG/1
50 TABLET ORAL DAILY
Qty: 5 TABLET | Refills: 0 | Status: SHIPPED | OUTPATIENT
Start: 2024-08-06

## 2024-08-06 RX ORDER — DESLORATADINE 5 MG/1
5 TABLET ORAL NIGHTLY
Qty: 30 TABLET | Refills: 0 | Status: SHIPPED | OUTPATIENT
Start: 2024-08-06 | End: 2025-08-06

## 2024-08-21 ENCOUNTER — OFFICE VISIT (OUTPATIENT)
Dept: FAMILY MEDICINE | Facility: CLINIC | Age: 29
End: 2024-08-21
Payer: COMMERCIAL

## 2024-08-21 ENCOUNTER — APPOINTMENT (OUTPATIENT)
Dept: LAB | Facility: HOSPITAL | Age: 29
End: 2024-08-21
Attending: INTERNAL MEDICINE
Payer: COMMERCIAL

## 2024-08-21 VITALS
WEIGHT: 209.69 LBS | DIASTOLIC BLOOD PRESSURE: 80 MMHG | HEIGHT: 63 IN | HEART RATE: 101 BPM | SYSTOLIC BLOOD PRESSURE: 120 MMHG | RESPIRATION RATE: 17 BRPM | OXYGEN SATURATION: 97 % | BODY MASS INDEX: 37.15 KG/M2 | TEMPERATURE: 99 F

## 2024-08-21 DIAGNOSIS — J06.9 ACUTE URI OF MULTIPLE SITES: Primary | ICD-10-CM

## 2024-08-21 PROCEDURE — 1159F MED LIST DOCD IN RCRD: CPT | Mod: CPTII,S$GLB,, | Performed by: NURSE PRACTITIONER

## 2024-08-21 PROCEDURE — 0241U SARS-COV2 (COVID) WITH FLU/RSV BY PCR: CPT | Performed by: NURSE PRACTITIONER

## 2024-08-21 PROCEDURE — 1160F RVW MEDS BY RX/DR IN RCRD: CPT | Mod: CPTII,S$GLB,, | Performed by: NURSE PRACTITIONER

## 2024-08-21 PROCEDURE — 99999 PR PBB SHADOW E&M-EST. PATIENT-LVL III: CPT | Mod: PBBFAC,,, | Performed by: NURSE PRACTITIONER

## 2024-08-21 PROCEDURE — 3079F DIAST BP 80-89 MM HG: CPT | Mod: CPTII,S$GLB,, | Performed by: NURSE PRACTITIONER

## 2024-08-21 PROCEDURE — 3008F BODY MASS INDEX DOCD: CPT | Mod: CPTII,S$GLB,, | Performed by: NURSE PRACTITIONER

## 2024-08-21 PROCEDURE — 99214 OFFICE O/P EST MOD 30 MIN: CPT | Mod: S$GLB,,, | Performed by: NURSE PRACTITIONER

## 2024-08-21 PROCEDURE — 3074F SYST BP LT 130 MM HG: CPT | Mod: CPTII,S$GLB,, | Performed by: NURSE PRACTITIONER

## 2024-08-22 ENCOUNTER — TELEPHONE (OUTPATIENT)
Dept: FAMILY MEDICINE | Facility: CLINIC | Age: 29
End: 2024-08-22
Payer: COMMERCIAL

## 2024-08-22 ENCOUNTER — PATIENT MESSAGE (OUTPATIENT)
Dept: FAMILY MEDICINE | Facility: CLINIC | Age: 29
End: 2024-08-22
Payer: COMMERCIAL

## 2024-08-22 DIAGNOSIS — U07.1 COVID: Primary | ICD-10-CM

## 2024-08-22 LAB
INFLUENZA A, MOLECULAR: NOT DETECTED
INFLUENZA B, MOLECULAR: NOT DETECTED
RSV AG BY MOLECULAR METHOD: NOT DETECTED
SARS-COV-2 RNA RESP QL NAA+PROBE: DETECTED

## 2024-08-22 RX ORDER — NIRMATRELVIR AND RITONAVIR 300-100 MG
KIT ORAL
Qty: 30 TABLET | Refills: 0 | Status: SHIPPED | OUTPATIENT
Start: 2024-08-22 | End: 2024-08-27

## 2024-08-22 NOTE — PROGRESS NOTES
Subjective:      Patient ID: Talia Marin is a 29 y.o. female.  Pt returns to clinic with new URI symptoms that began a couple of days ago. She was treated 2 weeks ago for post viral cough with azithromycin, prednisone, clarinex and zofran with resolution of cough a week ago.    URI   This is a recurrent problem. The current episode started yesterday. The problem has been rapidly worsening. The fever has been present for 1 to 2 days. The cough is Non-productive. Associated symptoms include abdominal pain, congestion, diarrhea, headaches, nausea, a plugged ear sensation, sinus pain and vomiting. Pertinent negatives include no chest pain, conjunctivitis, coughing, dysuria, ear pain, joint pain, joint swelling, neck pain, rash, rhinorrhea, sneezing, sore throat, swollen glands or wheezing. She has tried nothing for the symptoms.     Review of Systems   Constitutional:  Positive for activity change, appetite change, chills, diaphoresis, fatigue and fever. Negative for night sweats and unexpected weight change.   HENT:  Positive for nasal congestion and sinus pressure/congestion. Negative for ear pain, postnasal drip, rhinorrhea, sneezing, sore throat, trouble swallowing and voice change.    Eyes:  Negative for pain and visual disturbance.   Respiratory:  Negative for cough, chest tightness, shortness of breath and wheezing.    Cardiovascular:  Negative for chest pain and palpitations.   Gastrointestinal:  Positive for abdominal pain, change in bowel habit, diarrhea, nausea and vomiting. Negative for abdominal distention, anal bleeding, blood in stool, constipation, rectal pain, reflux and fecal incontinence.   Genitourinary:  Negative for difficulty urinating, dysuria and menstrual problem.   Musculoskeletal:  Positive for myalgias. Negative for arthralgias, back pain, gait problem, joint pain and neck pain.   Integumentary:  Negative for rash.   Allergic/Immunologic: Positive for environmental allergies and  "immunocompromised state.   Neurological:  Positive for headaches. Negative for dizziness, vertigo, tremors, seizures, syncope, facial asymmetry, speech difficulty, weakness, light-headedness, numbness, memory loss and coordination difficulties.   Psychiatric/Behavioral: Negative.     All other systems reviewed and are negative.        Objective:     Vitals:    08/21/24 0940   BP: 120/80   BP Location: Right arm   Patient Position: Sitting   BP Method: Large (Manual)   Pulse: 101   Resp: 17   Temp: 98.7 °F (37.1 °C)   TempSrc: Oral   SpO2: 97%   Weight: 95.1 kg (209 lb 10.5 oz)   Height: 5' 3" (1.6 m)     Physical Exam  Vitals and nursing note reviewed.   Constitutional:       General: She is not in acute distress.     Appearance: Normal appearance. She is well-developed and well-groomed. She is not ill-appearing.   HENT:      Head: Normocephalic and atraumatic.      Right Ear: Tympanic membrane, ear canal and external ear normal.      Left Ear: Tympanic membrane, ear canal and external ear normal.      Nose: Mucosal edema and congestion present. No rhinorrhea.      Mouth/Throat:      Lips: Pink.      Mouth: Mucous membranes are moist.      Pharynx: Uvula midline. Pharyngeal swelling and posterior oropharyngeal erythema present. No oropharyngeal exudate or uvula swelling.   Eyes:      General: Lids are normal. Vision grossly intact. Gaze aligned appropriately.      Conjunctiva/sclera: Conjunctivae normal.      Pupils: Pupils are equal, round, and reactive to light.   Neck:      Trachea: Phonation normal.   Cardiovascular:      Rate and Rhythm: Regular rhythm. Tachycardia present.      Heart sounds: Normal heart sounds.   Pulmonary:      Effort: Pulmonary effort is normal. No accessory muscle usage or respiratory distress.      Breath sounds: Normal breath sounds and air entry.   Abdominal:      General: Abdomen is flat. Bowel sounds are normal. There is no distension.      Palpations: Abdomen is soft.      " Tenderness: There is no abdominal tenderness.   Musculoskeletal:      Cervical back: Neck supple.      Right lower leg: No edema.      Left lower leg: No edema.   Skin:     General: Skin is warm and dry.      Findings: No rash.   Neurological:      General: No focal deficit present.      Mental Status: She is alert and oriented to person, place, and time. Mental status is at baseline.      Sensory: Sensation is intact.      Motor: Motor function is intact.      Coordination: Coordination is intact.      Gait: Gait is intact.   Psychiatric:         Attention and Perception: Attention and perception normal.         Mood and Affect: Mood and affect normal.         Speech: Speech normal.         Behavior: Behavior normal. Behavior is cooperative.         Thought Content: Thought content normal.         Cognition and Memory: Cognition and memory normal.         Judgment: Judgment normal.       Assessment and Plan:     1. Acute URI of multiple sites  Discussed diagnosis and treatment of URI.  Discussed the importance of avoiding unnecessary antibiotic therapy.  Suggested symptomatic OTC remedies.  Resume clarinex, zofran and bromfed as needed  Symptomatic therapy suggested: rest, increase fluids, gargle prn for sore throat, apply heat to sinuses prn, use mist of vaporizer prn, OTC acetaminophen, ibuprofen, and call prn if symptoms persist or worsen.   Call or return to clinic prn if these symptoms worsen or fail to improve as anticipated.  - SARS-Cov2 (COVID) with FLU/RSV by PCR           CLARA Richard, FNP-C  Family/Internal Medicine  [facility]

## 2024-08-22 NOTE — TELEPHONE ENCOUNTER
Call returned. Patient informed Covid/Flu/RSV results are currently still in process. She reports that she may be required to submit FMLA to her employer for the time-being. Advised that we may send a letter advising her results are still currently pending and if this would suffice, she said she will confirm and let us know a definitive answer.

## 2024-08-28 DIAGNOSIS — A37.90 PERTUSSIS-LIKE SYNDROME: ICD-10-CM

## 2024-08-28 RX ORDER — DESLORATADINE 5 MG/1
5 TABLET ORAL NIGHTLY
Qty: 90 TABLET | Refills: 1 | Status: SHIPPED | OUTPATIENT
Start: 2024-08-28

## 2024-08-28 NOTE — TELEPHONE ENCOUNTER
Refill Routing Note   Medication(s) are not appropriate for processing by [facility] for the following reason(s):        Non-participating provider    [facility] action(s):  Route               Appointments  past 12m or future 3m with PCP    Date Provider   Last Visit   8/21/2024 Adriana Hollingsworth, REINALDO   Next Visit   Visit date not found Adriana Hollingsworth, NP   ED visits in past 90 days: 0        Note composed:2:29 PM 08/28/2024

## 2024-11-11 ENCOUNTER — HOSPITAL ENCOUNTER (EMERGENCY)
Facility: HOSPITAL | Age: 29
Discharge: HOME | End: 2024-11-11
Attending: EMERGENCY MEDICINE
Payer: COMMERCIAL

## 2024-11-11 VITALS
DIASTOLIC BLOOD PRESSURE: 76 MMHG | HEART RATE: 87 BPM | SYSTOLIC BLOOD PRESSURE: 135 MMHG | TEMPERATURE: 98 F | BODY MASS INDEX: 37.03 KG/M2 | WEIGHT: 209 LBS | RESPIRATION RATE: 16 BRPM | HEIGHT: 63 IN | OXYGEN SATURATION: 98 %

## 2024-11-11 DIAGNOSIS — S91.331A PUNCTURE WOUND OF RIGHT FOOT, INITIAL ENCOUNTER: Primary | ICD-10-CM

## 2024-11-11 LAB
B-HCG UR QL: NEGATIVE
CTP QC/QA: YES

## 2024-11-11 PROCEDURE — 90715 TDAP VACCINE 7 YRS/> IM: CPT | Performed by: NURSE PRACTITIONER

## 2024-11-11 PROCEDURE — 63600175 PHARM REV CODE 636 W HCPCS: Performed by: NURSE PRACTITIONER

## 2024-11-11 PROCEDURE — 81025 URINE PREGNANCY TEST: CPT

## 2024-11-11 PROCEDURE — 99284 EMERGENCY DEPT VISIT MOD MDM: CPT | Mod: 25

## 2024-11-11 PROCEDURE — 90471 IMMUNIZATION ADMIN: CPT | Performed by: NURSE PRACTITIONER

## 2024-11-11 RX ORDER — LEVOFLOXACIN 500 MG/1
500 TABLET, FILM COATED ORAL DAILY
Qty: 5 TABLET | Refills: 0 | Status: SHIPPED | OUTPATIENT
Start: 2024-11-11

## 2024-11-11 RX ORDER — MUPIROCIN 20 MG/G
OINTMENT TOPICAL 2 TIMES DAILY
Qty: 15 G | Refills: 0 | Status: SHIPPED | OUTPATIENT
Start: 2024-11-11

## 2024-11-11 RX ADMIN — TETANUS TOXOID, REDUCED DIPHTHERIA TOXOID AND ACELLULAR PERTUSSIS VACCINE, ADSORBED 0.5 ML: 5; 2.5; 8; 8; 2.5 SUSPENSION INTRAMUSCULAR at 03:11

## 2024-11-11 NOTE — Clinical Note
"Talia"Edie Marin was seen and treated in our emergency department on 11/11/2024.  She may return to work on 11/13/2024.       If you have any questions or concerns, please don't hesitate to call.      Elena Muñoz PA-C"

## 2024-11-11 NOTE — FIRST PROVIDER EVALUATION
"Medical screening examination initiated.  I have conducted a focused provider triage encounter, findings are as follows:    Brief history of present illness:  stepped on nail    Vitals:    11/11/24 1502   BP: 135/76   Pulse: 87   Resp: 16   Temp: 98.3 °F (36.8 °C)   TempSrc: Oral   SpO2: 98%   Weight: 94.8 kg (209 lb)   Height: 5' 3" (1.6 m)       Pertinent physical exam: NAD    Brief workup plan:  td / irrigate    Preliminary workup initiated; this workup will be continued and followed by the physician or advanced practice provider that is assigned to the patient when roomed.  "

## 2024-11-11 NOTE — DISCHARGE INSTRUCTIONS
Do the following to improve pain and swelling:   Rest. Limit the use of the injured body part. This helps prevent further damage to the body part and gives it time to heal. In some cases, you may need a sling, brace, splint, or cast to help keep the body part still until it has healed.   Ice. Applying ice right after an injury helps relieve pain and swelling. Wrap a bag of ice in a thin towel. (Frozen peas also works well.) Then, place it over the injured area. Do this for 10 to 15 minutes every 3 to 4 hours. Continue for the next 1 to 2 days or until your symptoms improve. Never put ice directly on your skin or ice an area longer than 15 minutes at a time.   Compression. Putting pressure on an injury helps reduce swelling and provides support. Wrap the injured area firmly with an elastic bandage (ACE wrap). Make sure not to wrap the bandage too tightly or you will cut off blood flow to the injured area. If your bandage loosens, rewrap it. Do not wear an elastic bandage overnight.   Elevation. Keeping an injury raised above the level of your heart reduces swelling, pain, and throbbing. For instance, if you have a broken leg, it may help to rest your leg on several pillows when sitting or lying down.    Complete 5 day course of prophylactic antibiotic. Please keep your wound clean and dry.  Wash gently with soap and water and apply antibiotic ointment (bacitracin, neosporin, etc.) over the wound after washing. Please watch for signs of infection including: increased\spreading redness, swelling, pus-like discharge, or a fever greater than 100.4F. If you experience any of these, please contact your Primary Care Doctor or Return to the Emergency Department for a wound check.   Please follow up with your Primary Care Doctor in 3-5 days for wound recheck. You may return to the Emergency Department if you are unable to see your Primary Care Doctor.  Please return to the ER for any new or worsening symptoms.

## 2024-11-12 NOTE — ED PROVIDER NOTES
Encounter Date: 11/11/2024       History     Chief Complaint   Patient presents with    Foot Injury     Pt presents to ED with complaint of right foot pain after stepping on jonathan nail while at work. Pt denies being up to date with tetanus vaccine       29 y.o. female with no chronic PMHx presents for emergent evaluation of puncture wound to foot. Patient states that she stepped on a jonathan nail which punctured through the bottom of her shoe about an hour ago while at work. She denies any foreign body sensation, difficulty ambulating, foot numbness/ tingeling, erythema, or drainage. Patient has not  taken any medications at this time. States her tetanus is not up to date. Patient denies fever/chills, headache, chest pain, shortness of breath, abdominal pain, nausea/vomiting/diarrhea, urinary concerns, myalgias, or any other complaints at this time.     The history is provided by the patient.     Review of patient's allergies indicates:  No Known Allergies  Past Medical History:   Diagnosis Date    Anemia     History of blood transfusion 2014    After d&c in 2014 (post 16 week twin delivery)     Past Surgical History:   Procedure Laterality Date    DILATION AND CURETTAGE OF UTERUS       Family History   Problem Relation Name Age of Onset    Hyperlipidemia Father      Hypertension Father      Arrhythmia Neg Hx      Cardiomyopathy Neg Hx      Congenital heart disease Neg Hx      Heart attacks under age 50 Neg Hx      Pacemaker/defibrilator Neg Hx       Social History     Tobacco Use    Smoking status: Never    Smokeless tobacco: Never   Substance Use Topics    Alcohol use: No    Drug use: No     Review of Systems   Constitutional:  Negative for chills and fever.   HENT:  Negative for sore throat.    Respiratory:  Negative for shortness of breath.    Cardiovascular:  Negative for chest pain.   Gastrointestinal:  Negative for abdominal pain, diarrhea, nausea and vomiting.   Genitourinary:  Negative for decreased urine  volume, dysuria and hematuria.   Musculoskeletal:  Negative for gait problem and myalgias.   Skin:  Positive for wound. Negative for color change and rash.   Neurological:  Negative for weakness, numbness and headaches.   Psychiatric/Behavioral: Negative.         Physical Exam     Initial Vitals [11/11/24 1502]   BP Pulse Resp Temp SpO2   135/76 87 16 98.3 °F (36.8 °C) 98 %      MAP       --         Physical Exam    Nursing note and vitals reviewed.  Constitutional: She appears well-developed and well-nourished. She is not diaphoretic. No distress.   HENT:   Head: Normocephalic and atraumatic.   Right Ear: External ear normal.   Left Ear: External ear normal.   Eyes: Conjunctivae and EOM are normal.   Neck: Neck supple.   Normal range of motion.  Cardiovascular:  Normal rate.           Pulmonary/Chest: No stridor. No respiratory distress.   Musculoskeletal:         General: Normal range of motion.      Cervical back: Normal range of motion and neck supple.     Neurological: She is alert and oriented to person, place, and time. She has normal strength. No sensory deficit.   Skin: Skin is warm. Capillary refill takes less than 2 seconds. No rash noted. No erythema.   Pinpoint puncture wound to plantar aspect of right foot. No palpable or visible fb. No surrounding erythema, edema,  increased warmth, drainage, ecchymosis or other skin abnormalities. 2+ DP pulses. Distal sensation intact. Ambulating w/o difficulty.    Psychiatric: She has a normal mood and affect. Thought content normal.         ED Course   Procedures  Labs Reviewed   POCT URINE PREGNANCY       Result Value    POC Preg Test, Ur Negative       Acceptable Yes            Imaging Results    None          Medications   Tdap (BOOSTRIX) vaccine injection 0.5 mL (0.5 mLs Intramuscular Given 11/11/24 1517)     Medical Decision Making  This is an evaluation of a 29 y.o. female that presents to the Emergency Department for a wound check secondary to  puncture wound to right foot. Physical exam reveals a nontoxic and well appearing female. Patient is afebrile vital signs are stable. Wound exam reveals a well-healing pinpoint puncture wound to plantar aspect of right foot. No palpable or visible fb. No surrounding erythema, edema,  increased warmth, drainage, ecchymosis or other skin abnormalities. 2+ DP pulses. Distal sensation intact. Ambulating w/o difficulty.  The wound has no signs of cellulitis or infection.    Vital Signs Reassuring. Wound extensively cleaned. Tdap updated     D/c with levaquin for pseudomonas prophylaxis. Discussed appropriate wound care. RICE. The diagnosis, treatment plan, instructions for follow-up and reevaluation with her PCP as well as ED return precautions have been discussed and understanding of the information has been verbalized. All questions or concerns have been addressed.      Amount and/or Complexity of Data Reviewed  Labs: ordered.    Risk  OTC drugs.  Prescription drug management.                                      Clinical Impression:  Final diagnoses:  [S91.331A] Puncture wound of right foot, initial encounter (Primary)          ED Disposition Condition    Discharge Stable          ED Prescriptions       Medication Sig Dispense Start Date End Date Auth. Provider    mupirocin (BACTROBAN) 2 % ointment Apply topically 2 (two) times daily. 15 g 11/11/2024 -- Elena Muñoz PA-C    levoFLOXacin (LEVAQUIN) 500 MG tablet Take 1 tablet (500 mg total) by mouth once daily. 5 tablet 11/11/2024 -- Elena Muñoz PA-C          Follow-up Information       Follow up With Specialties Details Why Contact Info    [facility] - Emergency Dept Emergency Medicine Go to  For new or worsening symptoms [address] [facility] [place] [address] [phone]    Yen Luque MD Internal Medicine, Wound Care   [address] [place] [address]  [address]  [place] [address] [phone]                Elena Muñoz PA-C  11/12/24 0150

## 2025-01-10 ENCOUNTER — OFFICE VISIT (OUTPATIENT)
Dept: FAMILY MEDICINE | Facility: CLINIC | Age: 30
End: 2025-01-10
Payer: COMMERCIAL

## 2025-01-10 VITALS
HEIGHT: 63 IN | WEIGHT: 213.88 LBS | SYSTOLIC BLOOD PRESSURE: 122 MMHG | DIASTOLIC BLOOD PRESSURE: 74 MMHG | BODY MASS INDEX: 37.89 KG/M2 | OXYGEN SATURATION: 97 % | TEMPERATURE: 98 F | HEART RATE: 73 BPM

## 2025-01-10 DIAGNOSIS — Z00.00 ANNUAL PHYSICAL EXAM: Primary | ICD-10-CM

## 2025-01-10 DIAGNOSIS — R21 RASH OF NECK: ICD-10-CM

## 2025-01-10 PROCEDURE — 99999 PR PBB SHADOW E&M-EST. PATIENT-LVL IV: CPT | Mod: PBBFAC,,, | Performed by: NURSE PRACTITIONER

## 2025-01-10 RX ORDER — CLOTRIMAZOLE AND BETAMETHASONE DIPROPIONATE 10; .64 MG/G; MG/G
CREAM TOPICAL 2 TIMES DAILY
Qty: 45 G | Refills: 2 | Status: SHIPPED | OUTPATIENT
Start: 2025-01-10

## 2025-01-11 NOTE — PROGRESS NOTES
"Subjective:      Patient ID: Talia Marin is a 29 y.o. female.  Pt returns to clinic for routine check. Reports being in good health though is having recurrent rash to right neck that began ~3mths ago with slow spread.    Rash  This is a chronic problem. The current episode started more than 1 month ago. The problem is unchanged. The affected locations include the face and neck. The rash is characterized by dryness and itchiness. It is unknown if there was an exposure to a precipitant. Pertinent negatives include no anorexia, congestion, cough, diarrhea, eye pain, facial edema, fatigue, fever, joint pain, nail changes, rhinorrhea, shortness of breath, sore throat or vomiting. Past treatments include nothing. There is no history of allergies, asthma, eczema or varicella.     Review of Systems   Constitutional:  Negative for activity change, appetite change, fatigue, fever, night sweats and unexpected weight change.   HENT:  Negative for nasal congestion, rhinorrhea and sore throat.    Eyes:  Negative for pain and visual disturbance.   Respiratory:  Negative for cough, chest tightness and shortness of breath.    Cardiovascular:  Negative for chest pain and palpitations.   Gastrointestinal:  Negative for abdominal pain, anorexia, change in bowel habit, constipation, diarrhea, nausea and vomiting.   Genitourinary:  Negative for difficulty urinating, dysuria and menstrual problem.   Musculoskeletal:  Negative for arthralgias, back pain, gait problem, joint pain and myalgias.   Integumentary:  Positive for rash. Negative for nail changes.   Allergic/Immunologic: Negative.  Negative for environmental allergies.   Neurological:  Negative for weakness and headaches.   Psychiatric/Behavioral: Negative.     All other systems reviewed and are negative.        Objective:     Vitals:    01/10/25 1320   BP: 122/74   Pulse: 73   Temp: 98.2 °F (36.8 °C)   TempSrc: Oral   SpO2: 97%   Weight: 97 kg (213 lb 13.5 oz)   Height: 5' 3" " (1.6 m)     Physical Exam  Vitals and nursing note reviewed.   Constitutional:       General: She is not in acute distress.     Appearance: Normal appearance. She is well-developed and well-groomed. She is not ill-appearing.   HENT:      Head: Normocephalic and atraumatic.      Jaw: There is normal jaw occlusion.      Salivary Glands: Right salivary gland is not diffusely enlarged or tender. Left salivary gland is not diffusely enlarged or tender.        Right Ear: External ear normal.      Left Ear: External ear normal.      Nose: Nose normal.      Mouth/Throat:      Lips: Pink.      Mouth: Mucous membranes are moist.   Eyes:      General: Lids are normal. Vision grossly intact. Gaze aligned appropriately.      Conjunctiva/sclera: Conjunctivae normal.      Pupils: Pupils are equal, round, and reactive to light.   Neck:      Trachea: Phonation normal.   Cardiovascular:      Rate and Rhythm: Normal rate and regular rhythm.      Heart sounds: Normal heart sounds.   Pulmonary:      Effort: Pulmonary effort is normal. No accessory muscle usage or respiratory distress.      Breath sounds: Normal breath sounds and air entry.   Abdominal:      General: Abdomen is flat. Bowel sounds are normal. There is no distension.      Palpations: Abdomen is soft.      Tenderness: There is no abdominal tenderness.   Musculoskeletal:      Cervical back: Normal and neck supple. No spinous process tenderness or muscular tenderness.      Right lower leg: No edema.      Left lower leg: No edema.   Lymphadenopathy:      Cervical: No cervical adenopathy.   Skin:     General: Skin is warm and dry.      Findings: Rash present.   Neurological:      General: No focal deficit present.      Mental Status: She is alert and oriented to person, place, and time. Mental status is at baseline.      Sensory: Sensation is intact.      Motor: Motor function is intact.      Coordination: Coordination is intact.      Gait: Gait is intact.   Psychiatric:          Attention and Perception: Attention and perception normal.         Mood and Affect: Mood and affect normal.         Speech: Speech normal.         Behavior: Behavior normal. Behavior is cooperative.         Thought Content: Thought content normal.         Cognition and Memory: Cognition and memory normal.         Judgment: Judgment normal.       Assessment and Plan:     1. Annual physical exam (Primary)   Patient Counseling:  --Nutrition: Stressed importance of moderation in sodium/caffeine intake, saturated fat and cholesterol, caloric balance, sufficient intake of fresh fruits, vegetables, fiber, calcium, iron, and 1 mg of folate supplement per day (for females capable of pregnancy).  --Discussed the issue of estrogen replacement, calcium supplement, and the daily use of baby aspirin.  --Exercise: Stressed the importance of regular exercise.   --Substance Abuse: Discussed cessation/primary prevention of tobacco, alcohol, or other drug use; driving or other dangerous activities under the influence; availability of treatment for abuse.    --Sexuality: Discussed sexually transmitted diseases, partner selection, use of condoms, avoidance of unintended pregnancy  and contraceptive alternatives.   --Injury prevention: Discussed safety belts, safety helmets, smoke detector, smoking near bedding or upholstery.   --Dental health: Discussed importance of regular tooth brushing, flossing, and dental visits.  --Immunizations reviewed.  --Discussed benefits of screening colonoscopy.  --After hours service discussed with patient  - CBC Auto Differential; Future  - Comprehensive Metabolic Panel; Future  - Folate; Future  - Ferritin; Future  - Hemoglobin A1C; Future  - Magnesium; Future  - Lipid Panel; Future  - Iron and TIBC; Future  - Vitamin D; Future  - Vitamin B12; Future  - TSH; Future  - T4, Free; Future    2. Rash of neck  Benadryl prn for itching.  Information on the above diagnosis was given to the patient.  Observe  for signs of superimposed infection and systemic symptoms.  Reassurance was given to the patient.  Referral to Dermatology if unimproved.  Skin moisturizer.  Tylenol or Ibuprofen for pain, fever.  Watch for signs of fever or worsening of the rash.  - clotrimazole-betamethasone 1-0.05% (LOTRISONE) cream; Apply topically 2 (two) times daily.  Dispense: 45 g; Refill: 2  - Ambulatory referral/consult to Dermatology; Future           CLARA Richard, FNP-C  Family/Internal Medicine  [facility]

## 2025-02-11 ENCOUNTER — LAB VISIT (OUTPATIENT)
Dept: LAB | Facility: HOSPITAL | Age: 30
End: 2025-02-11
Attending: NURSE PRACTITIONER
Payer: COMMERCIAL

## 2025-02-11 DIAGNOSIS — Z00.00 ANNUAL PHYSICAL EXAM: ICD-10-CM

## 2025-02-11 LAB
25(OH)D3+25(OH)D2 SERPL-MCNC: 7 NG/ML (ref 30–96)
ALBUMIN SERPL BCP-MCNC: 4 G/DL (ref 3.5–5.2)
ALP SERPL-CCNC: 57 U/L (ref 40–150)
ALT SERPL W/O P-5'-P-CCNC: 24 U/L (ref 10–44)
ANION GAP SERPL CALC-SCNC: 8 MMOL/L (ref 8–16)
AST SERPL-CCNC: 24 U/L (ref 10–40)
BASOPHILS # BLD AUTO: 0.04 K/UL (ref 0–0.2)
BASOPHILS NFR BLD: 0.6 % (ref 0–1.9)
BILIRUB SERPL-MCNC: 0.5 MG/DL (ref 0.1–1)
BUN SERPL-MCNC: 9 MG/DL (ref 6–20)
CALCIUM SERPL-MCNC: 9 MG/DL (ref 8.7–10.5)
CHLORIDE SERPL-SCNC: 106 MMOL/L (ref 95–110)
CHOLEST SERPL-MCNC: 203 MG/DL (ref 120–199)
CHOLEST/HDLC SERPL: 4.7 {RATIO} (ref 2–5)
CO2 SERPL-SCNC: 24 MMOL/L (ref 23–29)
CREAT SERPL-MCNC: 0.8 MG/DL (ref 0.5–1.4)
DIFFERENTIAL METHOD BLD: ABNORMAL
EOSINOPHIL # BLD AUTO: 0.1 K/UL (ref 0–0.5)
EOSINOPHIL NFR BLD: 1.3 % (ref 0–8)
ERYTHROCYTE [DISTWIDTH] IN BLOOD BY AUTOMATED COUNT: 12.4 % (ref 11.5–14.5)
EST. GFR  (NO RACE VARIABLE): >60 ML/MIN/1.73 M^2
ESTIMATED AVG GLUCOSE: 108 MG/DL (ref 68–131)
FERRITIN SERPL-MCNC: 19 NG/ML (ref 20–300)
FOLATE SERPL-MCNC: 8.7 NG/ML (ref 4–24)
GLUCOSE SERPL-MCNC: 97 MG/DL (ref 70–110)
HBA1C MFR BLD: 5.4 % (ref 4–5.6)
HCT VFR BLD AUTO: 39.8 % (ref 37–48.5)
HDLC SERPL-MCNC: 43 MG/DL (ref 40–75)
HDLC SERPL: 21.2 % (ref 20–50)
HGB BLD-MCNC: 13.4 G/DL (ref 12–16)
IMM GRANULOCYTES # BLD AUTO: 0.02 K/UL (ref 0–0.04)
IMM GRANULOCYTES NFR BLD AUTO: 0.3 % (ref 0–0.5)
IRON SERPL-MCNC: 93 UG/DL (ref 30–160)
LDLC SERPL CALC-MCNC: 123.6 MG/DL (ref 63–159)
LYMPHOCYTES # BLD AUTO: 3 K/UL (ref 1–4.8)
LYMPHOCYTES NFR BLD: 41.6 % (ref 18–48)
MAGNESIUM SERPL-MCNC: 2 MG/DL (ref 1.6–2.6)
MCH RBC QN AUTO: 28.7 PG (ref 27–31)
MCHC RBC AUTO-ENTMCNC: 33.7 G/DL (ref 32–36)
MCV RBC AUTO: 85 FL (ref 82–98)
MONOCYTES # BLD AUTO: 0.5 K/UL (ref 0.3–1)
MONOCYTES NFR BLD: 7.3 % (ref 4–15)
NEUTROPHILS # BLD AUTO: 3.5 K/UL (ref 1.8–7.7)
NEUTROPHILS NFR BLD: 48.9 % (ref 38–73)
NONHDLC SERPL-MCNC: 160 MG/DL
NRBC BLD-RTO: 0 /100 WBC
PLATELET # BLD AUTO: 394 K/UL (ref 150–450)
PMV BLD AUTO: 9.1 FL (ref 9.2–12.9)
POTASSIUM SERPL-SCNC: 4.2 MMOL/L (ref 3.5–5.1)
PROT SERPL-MCNC: 7.7 G/DL (ref 6–8.4)
RBC # BLD AUTO: 4.67 M/UL (ref 4–5.4)
SATURATED IRON: 21 % (ref 20–50)
SODIUM SERPL-SCNC: 138 MMOL/L (ref 136–145)
T4 FREE SERPL-MCNC: 1.03 NG/DL (ref 0.71–1.51)
TOTAL IRON BINDING CAPACITY: 447 UG/DL (ref 250–450)
TRANSFERRIN SERPL-MCNC: 302 MG/DL (ref 200–375)
TRIGL SERPL-MCNC: 182 MG/DL (ref 30–150)
TSH SERPL DL<=0.005 MIU/L-ACNC: 1.42 UIU/ML (ref 0.4–4)
VIT B12 SERPL-MCNC: 319 PG/ML (ref 210–950)
WBC # BLD AUTO: 7.17 K/UL (ref 3.9–12.7)

## 2025-02-11 PROCEDURE — 84466 ASSAY OF TRANSFERRIN: CPT | Performed by: NURSE PRACTITIONER

## 2025-02-11 PROCEDURE — 84439 ASSAY OF FREE THYROXINE: CPT | Performed by: NURSE PRACTITIONER

## 2025-02-11 PROCEDURE — 82306 VITAMIN D 25 HYDROXY: CPT | Performed by: NURSE PRACTITIONER

## 2025-02-11 PROCEDURE — 83735 ASSAY OF MAGNESIUM: CPT | Performed by: NURSE PRACTITIONER

## 2025-02-11 PROCEDURE — 83036 HEMOGLOBIN GLYCOSYLATED A1C: CPT | Performed by: NURSE PRACTITIONER

## 2025-02-11 PROCEDURE — 80053 COMPREHEN METABOLIC PANEL: CPT | Performed by: NURSE PRACTITIONER

## 2025-02-11 PROCEDURE — 85025 COMPLETE CBC W/AUTO DIFF WBC: CPT | Performed by: NURSE PRACTITIONER

## 2025-02-11 PROCEDURE — 82607 VITAMIN B-12: CPT | Performed by: NURSE PRACTITIONER

## 2025-02-11 PROCEDURE — 36415 COLL VENOUS BLD VENIPUNCTURE: CPT | Performed by: NURSE PRACTITIONER

## 2025-02-11 PROCEDURE — 82746 ASSAY OF FOLIC ACID SERUM: CPT | Performed by: NURSE PRACTITIONER

## 2025-02-11 PROCEDURE — 84443 ASSAY THYROID STIM HORMONE: CPT | Performed by: NURSE PRACTITIONER

## 2025-02-11 PROCEDURE — 80061 LIPID PANEL: CPT | Performed by: NURSE PRACTITIONER

## 2025-02-11 PROCEDURE — 82728 ASSAY OF FERRITIN: CPT | Performed by: NURSE PRACTITIONER

## 2025-02-13 ENCOUNTER — PATIENT MESSAGE (OUTPATIENT)
Dept: FAMILY MEDICINE | Facility: CLINIC | Age: 30
End: 2025-02-13
Payer: COMMERCIAL

## 2025-02-13 DIAGNOSIS — E56.9 VITAMIN DEFICIENCY: Primary | ICD-10-CM

## 2025-02-13 DIAGNOSIS — E78.1 HYPERTRIGLYCERIDEMIA: ICD-10-CM

## 2025-02-13 RX ORDER — OMEGA-3-ACID ETHYL ESTERS 1 G/1
2 CAPSULE, LIQUID FILLED ORAL DAILY
Qty: 60 CAPSULE | Refills: 11 | Status: SHIPPED | OUTPATIENT
Start: 2025-02-13 | End: 2026-02-13

## 2025-02-13 RX ORDER — ERGOCALCIFEROL 1.25 MG/1
50000 CAPSULE ORAL
Qty: 13 CAPSULE | Refills: 3 | Status: SHIPPED | OUTPATIENT
Start: 2025-02-13

## 2025-02-14 ENCOUNTER — OFFICE VISIT (OUTPATIENT)
Dept: FAMILY MEDICINE | Facility: CLINIC | Age: 30
End: 2025-02-14
Payer: COMMERCIAL

## 2025-02-14 VITALS
BODY MASS INDEX: 37.34 KG/M2 | SYSTOLIC BLOOD PRESSURE: 112 MMHG | DIASTOLIC BLOOD PRESSURE: 78 MMHG | HEIGHT: 63 IN | OXYGEN SATURATION: 99 % | HEART RATE: 86 BPM | TEMPERATURE: 98 F | WEIGHT: 210.75 LBS

## 2025-02-14 DIAGNOSIS — E66.01 SEVERE OBESITY (BMI 35.0-39.9) WITH COMORBIDITY: Primary | ICD-10-CM

## 2025-02-14 PROCEDURE — 99999 PR PBB SHADOW E&M-EST. PATIENT-LVL IV: CPT | Mod: PBBFAC,,, | Performed by: NURSE PRACTITIONER

## 2025-02-17 ENCOUNTER — TELEPHONE (OUTPATIENT)
Dept: BARIATRICS | Facility: CLINIC | Age: 30
End: 2025-02-17
Payer: COMMERCIAL

## 2025-02-18 ENCOUNTER — HOSPITAL ENCOUNTER (OUTPATIENT)
Dept: RADIOLOGY | Facility: HOSPITAL | Age: 30
Discharge: HOME | End: 2025-02-18
Attending: NURSE PRACTITIONER
Payer: COMMERCIAL

## 2025-02-18 ENCOUNTER — PATIENT MESSAGE (OUTPATIENT)
Dept: FAMILY MEDICINE | Facility: CLINIC | Age: 30
End: 2025-02-18
Payer: COMMERCIAL

## 2025-02-18 ENCOUNTER — TELEPHONE (OUTPATIENT)
Dept: BARIATRICS | Facility: CLINIC | Age: 30
End: 2025-02-18
Payer: COMMERCIAL

## 2025-02-18 ENCOUNTER — OFFICE VISIT (OUTPATIENT)
Dept: FAMILY MEDICINE | Facility: CLINIC | Age: 30
End: 2025-02-18
Payer: COMMERCIAL

## 2025-02-18 VITALS
BODY MASS INDEX: 37.93 KG/M2 | WEIGHT: 214.06 LBS | RESPIRATION RATE: 16 BRPM | HEART RATE: 73 BPM | SYSTOLIC BLOOD PRESSURE: 98 MMHG | HEIGHT: 63 IN | TEMPERATURE: 98 F | OXYGEN SATURATION: 98 % | DIASTOLIC BLOOD PRESSURE: 60 MMHG
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
## 2025-02-18 PROCEDURE — 72070 X-RAY EXAM THORAC SPINE 2VWS: CPT | Mod: TC,FY

## 2025-02-18 PROCEDURE — 99999 PR PBB SHADOW E&M-EST. PATIENT-LVL IV: CPT | Mod: PBBFAC,,, | Performed by: NURSE PRACTITIONER

## 2025-02-18 PROCEDURE — 72114 X-RAY EXAM L-S SPINE BENDING: CPT | Mod: TC,FY

## 2025-02-18 PROCEDURE — 72052 X-RAY EXAM NECK SPINE 6/>VWS: CPT | Mod: TC,FY

## 2025-02-18 RX ORDER — METHOCARBAMOL 750 MG/1
750 TABLET, FILM COATED ORAL EVERY 4 HOURS PRN
Qty: 60 TABLET | Refills: 1 | Status: SHIPPED | OUTPATIENT
Start: 2025-02-18

## 2025-02-23 NOTE — PROGRESS NOTES
"Subjective:      Patient ID: Talia Marin is a 29 y.o. female.  Pt returns to clinic to discuss weight loss. She recently had annual exam with labs without acute finding or obvious metabolic disorder. She was previously prescribed iron and vitamin D supplements which she has yet to begin. States she has increased physical activity and strictly limiting diet without any change in weight. Pt is otherwise health and desires medication assistance to loose weight.       Review of Systems   Constitutional:  Negative for activity change, appetite change, chills, diaphoresis, fatigue, fever, night sweats and unexpected weight change.   HENT: Negative.     Eyes:  Negative for pain and visual disturbance.   Respiratory:  Negative for chest tightness and shortness of breath.    Cardiovascular:  Negative for chest pain and palpitations.   Gastrointestinal:  Negative for abdominal pain, change in bowel habit, constipation, diarrhea, nausea and vomiting.   Endocrine: Negative.    Genitourinary:  Negative for difficulty urinating, dysuria and menstrual problem.   Musculoskeletal:  Negative for arthralgias, back pain, gait problem and myalgias.   Integumentary:  Negative for rash.   Allergic/Immunologic: Negative.    Neurological:  Negative for weakness and headaches.   Hematological: Negative.    Psychiatric/Behavioral: Negative.     All other systems reviewed and are negative.        Objective:     Vitals:    02/14/25 1447   BP: 112/78   Pulse: 86   Temp: 97.5 °F (36.4 °C)   TempSrc: Oral   SpO2: 99%   Weight: 95.6 kg (210 lb 12.2 oz)   Height: 5' 3" (1.6 m)     Physical Exam  Vitals and nursing note reviewed.   Constitutional:       General: She is not in acute distress.     Appearance: Normal appearance. She is well-developed and well-groomed. She is obese. She is not ill-appearing.   HENT:      Head: Normocephalic and atraumatic.      Right Ear: External ear normal.      Left Ear: External ear normal.      Nose: Nose " normal.      Mouth/Throat:      Lips: Pink.      Mouth: Mucous membranes are moist.   Eyes:      General: Lids are normal. Vision grossly intact. Gaze aligned appropriately.      Conjunctiva/sclera: Conjunctivae normal.      Pupils: Pupils are equal, round, and reactive to light.   Neck:      Trachea: Phonation normal.   Cardiovascular:      Rate and Rhythm: Normal rate and regular rhythm.      Heart sounds: Normal heart sounds.   Pulmonary:      Effort: Pulmonary effort is normal. No accessory muscle usage or respiratory distress.      Breath sounds: Normal breath sounds and air entry.   Abdominal:      General: Abdomen is flat. Bowel sounds are normal. There is no distension.      Palpations: Abdomen is soft.      Tenderness: There is no abdominal tenderness.   Musculoskeletal:      Cervical back: Neck supple.      Right lower leg: No edema.      Left lower leg: No edema.   Skin:     General: Skin is warm and dry.      Findings: No rash.   Neurological:      General: No focal deficit present.      Mental Status: She is alert and oriented to person, place, and time. Mental status is at baseline.      Sensory: Sensation is intact.      Motor: Motor function is intact.      Coordination: Coordination is intact.      Gait: Gait is intact.   Psychiatric:         Attention and Perception: Attention and perception normal.         Mood and Affect: Mood and affect normal.         Speech: Speech normal.         Behavior: Behavior normal. Behavior is cooperative.         Thought Content: Thought content normal.         Cognition and Memory: Cognition and memory normal.         Judgment: Judgment normal.       Assessment and Plan:     1. Severe obesity (BMI 35.0-39.9) with comorbidity (Primary)  General weight loss/lifestyle modification strategies discussed (elicit support from others; identify saboteurs; non-food rewards, etc).  Behavioral treatment: stress management.  Diet interventions: qualitative changes (increase  low-fat,  high-fiber foods).  Informal exercise measures discussed, e.g. taking stairs instead of elevator.  Regular aerobic exercise program discussed.  F/u in obesity wellness clinic for medication assistance  - Ambulatory referral/consult to Bariatric/Obesity Medicine; Future           CLARA Richard, FNP-C  Family/Internal Medicine  [facility]

## 2025-03-05 NOTE — PROGRESS NOTES
Subjective:      Patient ID: Talia Marin is a 29 y.o. female.  Pt returns to clinic with headache after trip and fall resulting with head hitting a wall 2 days ago. Denies LOC or wounds.     Fall  The accident occurred 2 days ago. The fall occurred while walking. She fell from a height of 3 to 5 ft. She landed on Hard floor. There was no blood loss. The point of impact was the head. The pain is present in the head. The pain is moderate. The symptoms are aggravated by ambulation and movement. Associated symptoms include headaches. Pertinent negatives include no abdominal pain, bowel incontinence, fever, hearing loss, hematuria, loss of consciousness, nausea, numbness, tingling, visual change or vomiting. She has tried acetaminophen, NSAID and rest for the symptoms. The treatment provided no relief.   Headache   This is a new problem. The current episode started yesterday. The problem occurs constantly. The problem has been unchanged. The pain is located in the Parietal, temporal, occipital, retro-orbital and bilateral region. The pain radiates to the left neck, right neck and face. The pain quality is not similar to prior headaches. The quality of the pain is described as aching, throbbing and dull. The pain is moderate. Associated symptoms include back pain, muscle aches and neck pain. Pertinent negatives include no abdominal pain, abnormal behavior, anorexia, blurred vision, coughing, dizziness, drainage, ear pain, eye pain, eye redness, eye watering, facial sweating, fever, hearing loss, insomnia, loss of balance, nausea, numbness, phonophobia, photophobia, rhinorrhea, scalp tenderness, seizures, sinus pressure, sore throat, swollen glands, tingling, tinnitus, visual change, vomiting, weakness or weight loss. Nothing aggravates the symptoms. She has tried acetaminophen and NSAIDs for the symptoms. The treatment provided no relief. Her past medical history is significant for obesity. There is no history of  "cancer, cluster headaches, hypertension, immunosuppression, migraine headaches, migraines in the family, pseudotumor cerebri, recent head traumas, sinus disease or TMJ.     Review of Systems   Constitutional:  Negative for activity change, appetite change, fatigue, fever, night sweats, unexpected weight change and weight loss.   HENT:  Negative for ear pain, hearing loss, rhinorrhea, sinus pressure/congestion, sore throat and tinnitus.    Eyes:  Negative for blurred vision, photophobia, pain, redness and visual disturbance.   Respiratory:  Negative for cough, chest tightness and shortness of breath.    Cardiovascular:  Negative for chest pain and palpitations.   Gastrointestinal:  Negative for abdominal pain, anorexia, bowel incontinence, constipation, diarrhea, nausea and vomiting.   Genitourinary:  Negative for difficulty urinating, dysuria, hematuria and menstrual problem.   Musculoskeletal:  Positive for arthralgias, back pain, myalgias, neck pain and neck stiffness. Negative for gait problem, joint swelling, leg pain and joint deformity.   Integumentary:  Negative for color change, rash and wound.   Neurological:  Positive for headaches. Negative for dizziness, vertigo, tingling, tremors, seizures, loss of consciousness, syncope, facial asymmetry, speech difficulty, weakness, light-headedness, numbness, loss of balance, memory loss and coordination difficulties.   Psychiatric/Behavioral: Negative.  The patient does not have insomnia.    All other systems reviewed and are negative.        Objective:     Vitals:    02/18/25 0905   BP: 98/60   Pulse: 73   Resp: 16   Temp: 98.3 °F (36.8 °C)   TempSrc: Oral   SpO2: 98%   Weight: 97.1 kg (214 lb 1.1 oz)   Height: 5' 3" (1.6 m)     Physical Exam  Vitals and nursing note reviewed.   Constitutional:       General: She is not in acute distress.     Appearance: Normal appearance. She is well-developed and well-groomed. She is not ill-appearing.   HENT:      Head: " Normocephalic and atraumatic.      Jaw: There is normal jaw occlusion.      Right Ear: Tympanic membrane, ear canal and external ear normal.      Left Ear: Tympanic membrane, ear canal and external ear normal.      Nose: Nose normal.      Mouth/Throat:      Lips: Pink.      Mouth: Mucous membranes are moist.      Pharynx: Oropharynx is clear. Uvula midline.   Eyes:      General: Lids are normal. Vision grossly intact. Gaze aligned appropriately.      Extraocular Movements: Extraocular movements intact.      Conjunctiva/sclera: Conjunctivae normal.      Pupils: Pupils are equal, round, and reactive to light.   Neck:      Trachea: Trachea and phonation normal.     Cardiovascular:      Rate and Rhythm: Normal rate and regular rhythm.      Heart sounds: Normal heart sounds.   Pulmonary:      Effort: Pulmonary effort is normal. No accessory muscle usage or respiratory distress.      Breath sounds: Normal breath sounds and air entry.   Abdominal:      General: Abdomen is flat. Bowel sounds are normal. There is no distension.      Palpations: Abdomen is soft.      Tenderness: There is no abdominal tenderness.   Musculoskeletal:      Cervical back: Neck supple. Spasms, tenderness and bony tenderness present. No swelling, edema, deformity, erythema, signs of trauma, lacerations, rigidity, torticollis or crepitus. Spinous process tenderness and muscular tenderness present. No pain with movement. Normal range of motion.      Thoracic back: Spasms and tenderness present. No swelling, edema, deformity, signs of trauma, lacerations or bony tenderness. Normal range of motion. No scoliosis.      Lumbar back: Spasms and tenderness present. No swelling, edema, deformity, signs of trauma, lacerations or bony tenderness. Decreased range of motion. Negative right straight leg raise test and negative left straight leg raise test. No scoliosis.      Right lower leg: No edema.      Left lower leg: No edema.   Lymphadenopathy:       Cervical: No cervical adenopathy.   Skin:     General: Skin is warm and dry.      Findings: No abrasion, bruising, rash or wound.   Neurological:      General: No focal deficit present.      Mental Status: She is alert and oriented to person, place, and time. Mental status is at baseline.      Cranial Nerves: Cranial nerves 2-12 are intact.      Sensory: Sensation is intact.      Motor: Motor function is intact.      Coordination: Coordination is intact.      Gait: Gait is intact.   Psychiatric:         Attention and Perception: Attention and perception normal.         Mood and Affect: Mood and affect normal.         Speech: Speech normal.         Behavior: Behavior normal. Behavior is cooperative.         Thought Content: Thought content normal.         Cognition and Memory: Cognition and memory normal.         Judgment: Judgment normal.       EXAMINATION: XR CERVICAL SPINE 5 VIEW WITH FLEX AND EXT  FINDINGS:  Cervical vertebral body heights, disc spaces, and alignment are preserved.  The posterior elements and neural foramina appear intact.  No instability on flexion or extension.  No prevertebral soft tissue swelling.  Normal    EXAMINATION: XR THORACIC SPINE AP LATERAL  FINDINGS:  Thoracic vertebral body heights, disc spaces, and alignment are preserved.  The posterior elements are grossly intact.  Normal    EXAMINATION: XR LUMBAR SPINE 5 VIEW WITH FLEX AND EXT  FINDINGS:  Alignment: Alignment is maintained.  Vertebrae: Vertebral body heights are maintained.  No suspicious appearing lytic or blastic lesions.  Discs and facets: Disc heights are maintained. Facet joints are unremarkable.  Miscellaneous: IUD noted within the pelvis.  Normal views of the lumbar spine    Electronically signed by:Tom Araujo Jr  Date:                                            02/18/2025  Time:                                           11:37   Assessment and Plan:     1. Fall, initial encounter (Primary)  Fall with positive head  impact and no LOC, neurovascularly intact without acute sensory or motor deficit  Concussion and falls precautions discussed   - add more outdoor lighting  - always use handrails on the stairs  - always wear low-heeled or flat shoes or slippers with nonskid soles  - call the doctor if I am feeling too drowsy  - install bathroom grab bars  - join an exercise group in my community  - keep a flashlight by the bed  - keep my cell phone with me always  - learn how to get back up if I fall  - make an emergency alert plan in case I fall  -  clutter from the floors  - use a nonslip pad with throw rugs, or remove them completely  - use a cane or walker  - use a nightlight in the bathroom  - wear my glasses and/or hearing aid  - attend therapy  - X-Ray Cervical Spine 5 View With Flex And Ext; Future  - X-Ray Lumbar Complete Including Flex And Ext; Future  - X-Ray Thoracic Spine AP Lateral; Future  - methocarbamoL (ROBAXIN) 750 MG Tab; Take 1 tablet (750 mg total) by mouth every 4 (four) hours as needed.  Dispense: 60 tablet; Refill: 1           CLARA Richard, FNP-C  Family/Internal Medicine  [facility]

## 2025-03-07 ENCOUNTER — TELEPHONE (OUTPATIENT)
Dept: BARIATRICS | Facility: CLINIC | Age: 30
End: 2025-03-07
Payer: COMMERCIAL

## 2025-03-07 NOTE — TELEPHONE ENCOUNTER
----- Message from Alfie sent at 2/18/2025 10:32 AM CST -----  Regarding: called to schedule medical financial consult. lvm/pm 2nd attempt  called to schedule medical financial consult. lvm/pm 2nd attempt